# Patient Record
Sex: FEMALE | Race: WHITE | NOT HISPANIC OR LATINO | URBAN - METROPOLITAN AREA
[De-identification: names, ages, dates, MRNs, and addresses within clinical notes are randomized per-mention and may not be internally consistent; named-entity substitution may affect disease eponyms.]

---

## 2018-05-16 ENCOUNTER — INPATIENT (INPATIENT)
Facility: HOSPITAL | Age: 83
LOS: 1 days | Discharge: HOME | End: 2018-05-18
Attending: INTERNAL MEDICINE | Admitting: INTERNAL MEDICINE

## 2018-05-16 VITALS
SYSTOLIC BLOOD PRESSURE: 181 MMHG | TEMPERATURE: 97 F | HEART RATE: 68 BPM | RESPIRATION RATE: 18 BRPM | OXYGEN SATURATION: 99 % | DIASTOLIC BLOOD PRESSURE: 74 MMHG

## 2018-05-16 DIAGNOSIS — S72.009A FRACTURE OF UNSPECIFIED PART OF NECK OF UNSPECIFIED FEMUR, INITIAL ENCOUNTER FOR CLOSED FRACTURE: Chronic | ICD-10-CM

## 2018-05-16 LAB
ALBUMIN SERPL ELPH-MCNC: 4.7 G/DL — SIGNIFICANT CHANGE UP (ref 3.5–5.2)
ALP SERPL-CCNC: 97 U/L — SIGNIFICANT CHANGE UP (ref 30–115)
ALT FLD-CCNC: 10 U/L — SIGNIFICANT CHANGE UP (ref 0–41)
ANION GAP SERPL CALC-SCNC: 16 MMOL/L — HIGH (ref 7–14)
APTT BLD: 25.4 SEC — LOW (ref 27–39.2)
AST SERPL-CCNC: 18 U/L — SIGNIFICANT CHANGE UP (ref 0–41)
BASOPHILS # BLD AUTO: 0.04 K/UL — SIGNIFICANT CHANGE UP (ref 0–0.2)
BASOPHILS NFR BLD AUTO: 0.5 % — SIGNIFICANT CHANGE UP (ref 0–1)
BILIRUB SERPL-MCNC: 0.7 MG/DL — SIGNIFICANT CHANGE UP (ref 0.2–1.2)
BUN SERPL-MCNC: 26 MG/DL — HIGH (ref 10–20)
CALCIUM SERPL-MCNC: 10.5 MG/DL — HIGH (ref 8.5–10.1)
CHLORIDE SERPL-SCNC: 101 MMOL/L — SIGNIFICANT CHANGE UP (ref 98–110)
CK MB CFR SERPL CALC: 1.5 NG/ML — SIGNIFICANT CHANGE UP (ref 0.6–6.3)
CK SERPL-CCNC: 53 U/L — SIGNIFICANT CHANGE UP (ref 0–225)
CO2 SERPL-SCNC: 27 MMOL/L — SIGNIFICANT CHANGE UP (ref 17–32)
CREAT SERPL-MCNC: 1 MG/DL — SIGNIFICANT CHANGE UP (ref 0.7–1.5)
EOSINOPHIL # BLD AUTO: 0.39 K/UL — SIGNIFICANT CHANGE UP (ref 0–0.7)
EOSINOPHIL NFR BLD AUTO: 4.8 % — SIGNIFICANT CHANGE UP (ref 0–8)
GLUCOSE SERPL-MCNC: 109 MG/DL — HIGH (ref 70–99)
HCT VFR BLD CALC: 34.2 % — LOW (ref 37–47)
HGB BLD-MCNC: 11.8 G/DL — LOW (ref 12–16)
IMM GRANULOCYTES NFR BLD AUTO: 0.4 % — HIGH (ref 0.1–0.3)
INR BLD: 1.02 RATIO — SIGNIFICANT CHANGE UP (ref 0.65–1.3)
LYMPHOCYTES # BLD AUTO: 1.31 K/UL — SIGNIFICANT CHANGE UP (ref 1.2–3.4)
LYMPHOCYTES # BLD AUTO: 16.2 % — LOW (ref 20.5–51.1)
MCHC RBC-ENTMCNC: 31.2 PG — HIGH (ref 27–31)
MCHC RBC-ENTMCNC: 34.5 G/DL — SIGNIFICANT CHANGE UP (ref 32–37)
MCV RBC AUTO: 90.5 FL — SIGNIFICANT CHANGE UP (ref 81–99)
MONOCYTES # BLD AUTO: 0.45 K/UL — SIGNIFICANT CHANGE UP (ref 0.1–0.6)
MONOCYTES NFR BLD AUTO: 5.6 % — SIGNIFICANT CHANGE UP (ref 1.7–9.3)
NEUTROPHILS # BLD AUTO: 5.87 K/UL — SIGNIFICANT CHANGE UP (ref 1.4–6.5)
NEUTROPHILS NFR BLD AUTO: 72.5 % — SIGNIFICANT CHANGE UP (ref 42.2–75.2)
NRBC # BLD: 0 /100 WBCS — SIGNIFICANT CHANGE UP (ref 0–0)
PLATELET # BLD AUTO: 320 K/UL — SIGNIFICANT CHANGE UP (ref 130–400)
POTASSIUM SERPL-MCNC: 3.6 MMOL/L — SIGNIFICANT CHANGE UP (ref 3.5–5)
POTASSIUM SERPL-SCNC: 3.6 MMOL/L — SIGNIFICANT CHANGE UP (ref 3.5–5)
PROT SERPL-MCNC: 7.7 G/DL — SIGNIFICANT CHANGE UP (ref 6–8)
PROTHROM AB SERPL-ACNC: 11 SEC — SIGNIFICANT CHANGE UP (ref 9.95–12.87)
RBC # BLD: 3.78 M/UL — LOW (ref 4.2–5.4)
RBC # FLD: 12.9 % — SIGNIFICANT CHANGE UP (ref 11.5–14.5)
SODIUM SERPL-SCNC: 144 MMOL/L — SIGNIFICANT CHANGE UP (ref 135–146)
TROPONIN T SERPL-MCNC: <0.01 NG/ML — SIGNIFICANT CHANGE UP
WBC # BLD: 8.09 K/UL — SIGNIFICANT CHANGE UP (ref 4.8–10.8)
WBC # FLD AUTO: 8.09 K/UL — SIGNIFICANT CHANGE UP (ref 4.8–10.8)

## 2018-05-16 RX ORDER — CITALOPRAM 10 MG/1
20 TABLET, FILM COATED ORAL DAILY
Qty: 0 | Refills: 0 | Status: DISCONTINUED | OUTPATIENT
Start: 2018-05-16 | End: 2018-05-18

## 2018-05-16 RX ORDER — PANTOPRAZOLE SODIUM 20 MG/1
40 TABLET, DELAYED RELEASE ORAL
Qty: 0 | Refills: 0 | Status: DISCONTINUED | OUTPATIENT
Start: 2018-05-16 | End: 2018-05-18

## 2018-05-16 RX ORDER — ACETAMINOPHEN 500 MG
650 TABLET ORAL EVERY 6 HOURS
Qty: 0 | Refills: 0 | Status: DISCONTINUED | OUTPATIENT
Start: 2018-05-16 | End: 2018-05-18

## 2018-05-16 RX ORDER — ATORVASTATIN CALCIUM 80 MG/1
80 TABLET, FILM COATED ORAL AT BEDTIME
Qty: 0 | Refills: 0 | Status: DISCONTINUED | OUTPATIENT
Start: 2018-05-16 | End: 2018-05-18

## 2018-05-16 RX ORDER — LEVOTHYROXINE SODIUM 125 MCG
50 TABLET ORAL DAILY
Qty: 0 | Refills: 0 | Status: DISCONTINUED | OUTPATIENT
Start: 2018-05-16 | End: 2018-05-18

## 2018-05-16 RX ORDER — AMLODIPINE BESYLATE 2.5 MG/1
5 TABLET ORAL DAILY
Qty: 0 | Refills: 0 | Status: DISCONTINUED | OUTPATIENT
Start: 2018-05-16 | End: 2018-05-18

## 2018-05-16 RX ORDER — ASPIRIN/CALCIUM CARB/MAGNESIUM 324 MG
162 TABLET ORAL DAILY
Qty: 0 | Refills: 0 | Status: DISCONTINUED | OUTPATIENT
Start: 2018-05-16 | End: 2018-05-18

## 2018-05-16 RX ORDER — HYDROCORTISONE 1 %
1 OINTMENT (GRAM) TOPICAL
Qty: 0 | Refills: 0 | Status: DISCONTINUED | OUTPATIENT
Start: 2018-05-16 | End: 2018-05-18

## 2018-05-16 RX ADMIN — Medication 650 MILLIGRAM(S): at 20:49

## 2018-05-16 RX ADMIN — Medication 162 MILLIGRAM(S): at 22:18

## 2018-05-16 RX ADMIN — ATORVASTATIN CALCIUM 80 MILLIGRAM(S): 80 TABLET, FILM COATED ORAL at 22:02

## 2018-05-16 NOTE — PROGRESS NOTE ADULT - SUBJECTIVE AND OBJECTIVE BOX
***STROKE ALERT CONSULT NOTE    Chief Complaint: stroke alert    Last known normal time: 0800    HPI:  89 yo woman with a PMH of HTN, anemia, hypothyroidism present to the ED after waking up with room spinning dizziness which isn't uncommon for her. As per family she was also taking longer to answer questions. She was persistently dizzy and went to the PMD, who referred her to the ER. On exam dizziness has subsided and her NIHSS was 0. No IV TPA for NIHSS 0.    PAST MEDICAL & SURGICAL HISTORY:  Anemia  Aortic stenosis  [place]'s disease  Hypothyroid  HTN (hypertension)      FAMILY HISTORY:      Social History: (-) x 3    Allergies    penicillins (Unknown)  sulfonamides (Unknown)    Intolerances        MEDICATIONS  (STANDING):    MEDICATIONS  (PRN):    Vital Signs Last 24 Hrs  T(C): 36.2 (16 May 2018 14:04), Max: 36.2 (16 May 2018 14:04)  T(F): 97.1 (16 May 2018 14:04), Max: 97.1 (16 May 2018 14:04)  HR: 68 (16 May 2018 14:04) (68 - 68)  BP: 181/74 (16 May 2018 14:04) (181/74 - 181/74)  BP(mean): --  RR: 18 (16 May 2018 14:04) (18 - 18)  SpO2: 99% (16 May 2018 14:04) (99% - 99%)    Neurologic Examination:  General:  Appearance is consistent with chronologic age.  No abnormal facies.   General: The patient is oriented to person, place, time and date.   Fund of knowledge is intact and normal.  Language with normal repetition, comprehension and naming.  Nondysarthric.    Cranial nerves: intact VA, VFF.  EOMI w/o nystagmus, skew or reported double vision.  PERRL. right eye chronically abducted. No ptosis/weakness of eyelid closure.  Facial sensation is normal with normal bite.  No facial asymmetry.  Hearing grossly intact b/l.  Palate elevates midline.  Tongue midline.  Motor examination:   Normal tone, bulk and range of motion.  No tenderness, twitching, tremors or involuntary movements.  Formal Muscle Strength Testing: (MRC grade R/L) 5/5 UE; 5/5 LE.  No observable drift.  Sensory examination:   Intact to light touch and pinprick, pain, temperature and proprioception and vibration in all extremities.  Cerebellum:   FTN/HKS intact with normal JORADN in all limbs.  No dysmetria or dysdiadokinesia.    NIH Stroke Scale:0    Neuroimaging:  NCHCT:  CTH prelim neg  Assessment:    89 yo woman with a PMH of HTN, anemia, hypothyroidism present to the ED after waking up with room spinning dizziness which isn't uncommon for her. As per family she was also taking longer to answer questions. She was persistently dizzy and went to the PMD, who referred her to the ER. On exam dizziness has subsided and her NIHSS was 0.    Plan:   MRI brain w/o kari  Observational unit.  Normal Saline 75cc/hr   mg daily   Lipitor 80 mg qhs  PT OT Rehab  Speech therapy    Tyson Bass NP  [phone] ***STROKE ALERT CONSULT NOTE    Chief Complaint: stroke alert    Last known normal time: 0800    HPI:  89 yo woman with a PMH of HTN, anemia, hypothyroidism present to the ED after waking up with room spinning dizziness which isn't uncommon for her. As per family she was also taking longer to answer questions. She was persistently dizzy and went to the PMD, who referred her to the ER. On exam dizziness has subsided and her NIHSS was 0. No IV TPA for NIHSS 0.    PAST MEDICAL & SURGICAL HISTORY:  Anemia  Aortic stenosis  [place]'s disease  Hypothyroid  HTN (hypertension)      FAMILY HISTORY:      Social History: (-) x 3    Allergies    penicillins (Unknown)  sulfonamides (Unknown)    Intolerances        MEDICATIONS  (STANDING):    MEDICATIONS  (PRN):    Vital Signs Last 24 Hrs  T(C): 36.2 (16 May 2018 14:04), Max: 36.2 (16 May 2018 14:04)  T(F): 97.1 (16 May 2018 14:04), Max: 97.1 (16 May 2018 14:04)  HR: 68 (16 May 2018 14:04) (68 - 68)  BP: 181/74 (16 May 2018 14:04) (181/74 - 181/74)  BP(mean): --  RR: 18 (16 May 2018 14:04) (18 - 18)  SpO2: 99% (16 May 2018 14:04) (99% - 99%)    Neurologic Examination:  General:  Appearance is consistent with chronologic age.  No abnormal facies.   General: The patient is oriented to person, place, time and date.   Fund of knowledge is intact and normal.  Language with normal repetition, comprehension and naming.  Nondysarthric.    Cranial nerves: intact VA, VFF.  EOMI w/o nystagmus, skew or reported double vision.  PERRL. right eye chronically abducted. No ptosis/weakness of eyelid closure.  Facial sensation is normal with normal bite.  No facial asymmetry.  Hearing grossly intact b/l.  Palate elevates midline.  Tongue midline.  Motor examination:   Normal tone, bulk and range of motion.  No tenderness, twitching, tremors or involuntary movements.  Formal Muscle Strength Testing: (MRC grade R/L) 5/5 UE; 5/5 LE.  No observable drift.  Sensory examination:   Intact to light touch and pinprick, pain, temperature and proprioception and vibration in all extremities.  Cerebellum:   FTN/HKS intact with normal JORDAN in all limbs.  No dysmetria or dysdiadokinesia.    NIH Stroke Scale:0    Neuroimaging:  NCHCT:  CTH prelim neg  Assessment:    89 yo woman with a PMH of HTN, anemia, hypothyroidism present to the ED after waking up with room spinning dizziness which isn't uncommon for her. As per family she was also taking longer to answer questions. She was persistently dizzy and went to the PMD, who referred her to the ER. On exam dizziness has subsided and her NIHSS was 0.    Plan:   MRI brain w/o kari  Observational unit.  Normal Saline 75cc/hr  PT OT Rehab  Speech therapy    Tyson Bass NP  [phone] ***STROKE ALERT CONSULT NOTE    Chief Complaint: stroke alert    Last known normal time: 0800    HPI:  89 yo woman with a PMH of HTN, anemia, hypothyroidism present to the ED after waking up with room spinning dizziness which isn't uncommon for her. As per family she was also taking longer to answer questions. She was persistently dizzy and went to the PMD, who referred her to the ER. On exam dizziness has subsided and her NIHSS was 0. No IV TPA for NIHSS 0.    PAST MEDICAL & SURGICAL HISTORY:  Anemia  Aortic stenosis  [place]'s disease  Hypothyroid  HTN (hypertension)      FAMILY HISTORY:      Social History: (-) x 3    Allergies    penicillins (Unknown)  sulfonamides (Unknown)    Intolerances        MEDICATIONS  (STANDING):    MEDICATIONS  (PRN):    Vital Signs Last 24 Hrs  T(C): 36.2 (16 May 2018 14:04), Max: 36.2 (16 May 2018 14:04)  T(F): 97.1 (16 May 2018 14:04), Max: 97.1 (16 May 2018 14:04)  HR: 68 (16 May 2018 14:04) (68 - 68)  BP: 181/74 (16 May 2018 14:04) (181/74 - 181/74)  BP(mean): --  RR: 18 (16 May 2018 14:04) (18 - 18)  SpO2: 99% (16 May 2018 14:04) (99% - 99%)    Neurologic Examination:  General:  Appearance is consistent with chronologic age.  No abnormal facies.   General: The patient is oriented to person, place, time and date.   Fund of knowledge is intact and normal.  Language with normal repetition, comprehension and naming.  Nondysarthric.    Cranial nerves: intact VA, VFF.  EOMI w/o nystagmus, skew or reported double vision.  PERRL. right eye chronically abducted. No ptosis/weakness of eyelid closure.  Facial sensation is normal with normal bite.  No facial asymmetry.  Hearing grossly intact b/l.  Palate elevates midline.  Tongue midline.  Motor examination:   Normal tone, bulk and range of motion.  No tenderness, twitching, tremors or involuntary movements.  Formal Muscle Strength Testing: (MRC grade R/L) 5/5 UE; 5/5 LE.  No observable drift.  Sensory examination:   Intact to light touch and pinprick, pain, temperature and proprioception and vibration in all extremities.  Cerebellum:   FTN/HKS intact with normal JORDAN in all limbs.  No dysmetria or dysdiadokinesia.    NIH Stroke Scale:0    Neuroimaging:  NCHCT:  1.  Periventricular and subcortical white matter chronic small vessel   ischemic changes.    2.  No acute mass effect, midline shift or hemorrhage.      Assessment:    89 yo woman with a PMH of HTN, anemia, hypothyroidism present to the ED after waking up with room spinning dizziness which isn't uncommon for her. As per family she was also taking longer to answer questions. She was persistently dizzy and went to the PMD, who referred her to the ER. On exam dizziness has subsided and her NIHSS was 0. No acute stroke intervention.    Plan:   MRI brain w/o kari  Observational unit.   mg daily.  Lipitor 80 mg Qhs.  Normal Saline 75cc/hr  PT OT Rehab  Speech therapy    Tyson Bass NP  [id number]

## 2018-05-16 NOTE — H&P ADULT - NSHPPHYSICALEXAM_GEN_ALL_CORE
Vital Signs Last 24 Hrs  T(C): 36.2 (16 May 2018 14:04), Max: 36.2 (16 May 2018 14:04)  T(F): 97.1 (16 May 2018 14:04), Max: 97.1 (16 May 2018 14:04)  HR: 68 (16 May 2018 14:04) (68 - 68)  BP: 181/74 (16 May 2018 14:04) (181/74 - 181/74)  BP(mean): --  RR: 18 (16 May 2018 14:04) (18 - 18)  SpO2: 99% (16 May 2018 14:04) (99% - 99%)    PHYSICAL EXAM:  GENERAL: NAD, well-developed  HEAD:  Atraumatic, Normocephalic  EYES: EOMI, PERRLA, conjunctiva and sclera clear  NECK: Supple, No JVD  CHEST/LUNG: Clear to auscultation bilaterally; No wheeze  HEART: Regular rate and rhythm; No murmurs, rubs, or gallops  ABDOMEN: Soft, Nontender, Nondistended; Bowel sounds present  EXTREMITIES:  2+ Peripheral Pulses, No clubbing, cyanosis, or edema  PSYCH: AAOx3    NEUROLOGY: Appearance is consistent with chronologic age.  No abnormal facies.   The patient is oriented to person, place, time and date.   Fund of knowledge is intact and normal.  Language with normal repetition, comprehension and naming.  Nondysarthric.    Cranial nerves: intact VA, VFF.  EOMI w/o nystagmus, skew or reported double vision.  PERRL. right eye chronically abducted. No ptosis/weakness of eyelid closure.  Facial sensation is normal with normal bite.  No facial asymmetry.  Hearing grossly intact b/l.  Palate elevates midline.  Tongue midline.  Motor examination:   Normal tone, bulk and range of motion.  No tenderness, twitching, tremors or involuntary movements.  Formal Muscle Strength Testing: (MRC grade R/L) 5/5 UE; 5/5 LE.  No observable drift.  Sensory examination:   Intact to light touch and pinprick, pain, temperature and proprioception and vibration in all extremities.  Cerebellum:   FTN/HKS intact with normal JORDAN in all limbs.  No dysmetria or dysdiadokinesia.    SKIN: diffuse erythematous patches with excoriations to neck and b/l upper extremities

## 2018-05-16 NOTE — ED PROVIDER NOTE - ATTENDING CONTRIBUTION TO CARE
86 YOF PMHx Aortic stenosis, [place] disease, (+ASA), p/w weakness and feeling "off-balance" since she woke up this am at 9am.  Per pt, these sx are common, however sx usually resolve after waking up and doing her routine.  Sx did not go away.  Daughter came over, took pt to PMD.  PMD was concerned w/ pt's responses to questions.  Her time was delayed and she did not respond as she normally would.   Pt to ED for further eval.   Full neuro exam in ED normal, NIHSS: 0.  however pt still w. delayed responses to questions and feeling "off".  Stroke code activated.   Stroke team bedside..     Denies trauma. Denies fever, HA, fever, confusion, focal weakness, urinary symptoms, CP, SOB, leg swelling, vision changes.  Impression: off balance and generalized weakness since pt woke up.   Concern for CVA vs medication vs electrolyte vs infection vs other  Plan:  fingerstick, CT head, EKG, labs, CXR, d/w neuro, re-assess

## 2018-05-16 NOTE — PROGRESS NOTE ADULT - ATTENDING COMMENTS
Patient seen and examined agree with above except as noted.  Patient presenting with dizziness and slow to respond to family  CTH unremarkable  CTA no major intravascular stenosis    A+Ox3 language + Attention nl  CN 2-12 NL  power 5/5 no drift  Temp, Vib symmetric  FTN normal  HTS normal  plantar down b/l  NIHSS 0    Patient is a 87yo F presenting with dizziness  1. Admit to stroke unit  2. Q2 hour neurochecks; if any change in neuro exam or increase in NIHSS call call STAT  3. MRI brain MRA H+N  4. Follow up labs  5. 2dECHO  6. -160  7. ASA + Statin

## 2018-05-16 NOTE — ED PROVIDER NOTE - OBJECTIVE STATEMENT
85y/o F, h/o AS, Grovers disease, presents with weakness and feeling off-balance since 9am this morning when she woke up.   Per family, pt was at baseline yesterday. Denies fever, HA, trauma, urinary symptoms, CP, SOB, leg swelling. 87y/o F, h/o Aortic stenosis, [place] disease, +ASA, presents with weakness and feeling off-balance since 9am this morning when she woke up. She was seen by her PMD this morning who referred her to the ED. Pt states she is currently feeling better. Per family, pt was at baseline yesterday but c/o an erythematous pruritic rash. No trauma,Denies fever, HA, trauma, urinary symptoms, CP, SOB, leg swelling, visin changes. 85y/o F, h/o Aortic stenosis, [place] disease, +ASA, presents with weakness and feeling off-balance since 9am this morning when she woke up. She was seen by her PMD this morning who referred her to the ED. Pt states she is currently feeling better. Per family, pt was at baseline yesterday but c/o an erythematous pruritic rash. No trauma.Denies fever, HA, trauma, urinary symptoms, CP, SOB, leg swelling, visin changes. 85y/o F, h/o Aortic stenosis, [place] disease, +ASA, presents with weakness and feeling off-balance since 9am this morning when she woke up. She was seen by her PMD this morning who referred her to the ED. Pt states she is currently feeling better. Per family, pt was at baseline yesterday but c/o an erythematous pruritic rash. No trauma.Denies fever, HA, trauma, urinary symptoms, CP, SOB, leg swelling, vision changes.

## 2018-05-16 NOTE — ED PROVIDER NOTE - SKIN COLOR
diffuse erythematous patches with excoriations diffuse erythematous patches with excoriations to neck and b/l upper extremities

## 2018-05-16 NOTE — H&P ADULT - HISTORY OF PRESENT ILLNESS
89 yo woman with a PMH of HTN, anemia, hypothyroidism present to the ED after waking up with room spinning around her and dizziness which has been happening for quite some time. As per family she was also taking longer to answer questions. the condition lasted for about 5 hours which made her to pay a visit to her PMD, who referred her to the ER. Also, patient reports leaning toward her left side. She denies other neurological symptoms or deficits, also no visual changes or headache. No report of fever or chills  On exam dizziness has subsided and her NIHSS was 0. No IV TPA for NIHSS 0.

## 2018-05-16 NOTE — H&P ADULT - NSHPLABSRESULTS_GEN_ALL_CORE
11.8   8.09  )-----------( 320      ( 16 May 2018 16:20 )             34.2     05-16    144  |  101  |  26<H>  ----------------------------<  109<H>  3.6   |  27  |  1.0    Ca    10.5<H>      16 May 2018 16:20    TPro  7.7  /  Alb  4.7  /  TBili  0.7  /  DBili  x   /  AST  18  /  ALT  10  /  AlkPhos  97  05-16    CARDIAC MARKERS ( 16 May 2018 16:20 )  x     / <0.01 ng/mL / 53 U/L / x     / 1.5 ng/mL    PT/INR - ( 16 May 2018 16:20 )   PT: 11.00 sec;   INR: 1.02 ratio    PTT - ( 16 May 2018 16:20 )  PTT:25.4 sec    < from: CT Angio Head w/ IV Cont (05.16.18 @ 16:58) >    Impression:    CTA neck: Mild (approximately 30-40%) focal stenosis of the proximal left   internal carotidartery at the bifurcation.    No significant right internal carotid artery stenosis.    CTA head: No significant vascular stenosis within the head.    < end of copied text >    < from: CT Angio Neck w/ IV Cont (05.16.18 @ 16:56) >      Impression:    CTA neck: Mild (approximately 30-40%) focal stenosis of the proximal left   internal carotidartery at the bifurcation.    No significant right internal carotid artery stenosis.    CTA head: No significant vascular stenosis within the head.    < end of copied text >    < from: CT Head No Cont (05.16.18 @ 16:14) >      IMPRESSION:     1.  Periventricular and subcortical white matter chronic small vessel   ischemic changes.    2.  No acute mass effect, midline shift or hemorrhage.      3.  If the patient continues to be symptomatic follow-up MRI of the brain   may be helpful for further evaluation.    < end of copied text >

## 2018-05-16 NOTE — ED PROVIDER NOTE - MEDICAL DECISION MAKING DETAILS
All studies reviewed at length w/ pt and daughter and stroke team and admitting team.  All parties agree w/ admission to stroke unit for further eval and tx.  Pt remians w/ NIHSS 0

## 2018-05-16 NOTE — H&P ADULT - ASSESSMENT
89 yo woman with a PMH of HTN, anemia, hypothyroidism present to the ED after waking up with room spinning around her and dizziness.  The condition was associated with unsteady gait and leaning toward her left side.    1) vertigo & Dizziness: r/o CVA as a cause of central vertigo  -admit to stroke unit  -neuro fu  -Q2 hour neurochecks if any change in neuro exam or increase in NIHSS call neuro STAT  -MRI brain MRA H+N  - mg daily  -Lipitor 80 mg Qhs.  -2d echo  -keep -160  -pt/rehab    2) HTN  -keep -160 in the first 24 hr, then SBP < 150 afterwards  -Hold HCTZ given her borderline (hypernatremia & hypokalemia) and given the ability of HCTZ to cause orthostasis and dizziness especially in the elderly population  -Norvasc 5 mg q24 hr is an appropriate alternative    3) Hypothyroidism  -c/w synthroid  -TSH eval    4) skin rash: looks like hives  -will use hydrocortisone cream  -avoid classic antihistaminics as they might make patient drowsy which will make the neurochecks challenging    5) DVT ppx: sequentials till a CVA is r/o by MRI    6) GI ppx

## 2018-05-16 NOTE — ED PROVIDER NOTE - PROGRESS NOTE DETAILS
Per pt, she woke up this morning and then developed dizziness/ room spinning.   States these sx are common, however sx usually resolve after waking up and doing her routine.  Sx did not go away.  Daughter came over, took pt to PMD.  PMD was concerned w/ pt's responses to questions.  Her time was delayed and she did not respond as she normally would.   Pt to ED for further eval.   Full neuro exam in ED normal, however pt still w. delayed responses to questions and feeling "off".  Stroke code activated.   Stroke team bedside I agree with evaluation and treatment of this patient wit TIM Mercer.

## 2018-05-17 LAB
ALBUMIN SERPL ELPH-MCNC: 3.7 G/DL — SIGNIFICANT CHANGE UP (ref 3.5–5.2)
ALP SERPL-CCNC: 76 U/L — SIGNIFICANT CHANGE UP (ref 30–115)
ALT FLD-CCNC: 8 U/L — SIGNIFICANT CHANGE UP (ref 0–41)
ANION GAP SERPL CALC-SCNC: 10 MMOL/L — SIGNIFICANT CHANGE UP (ref 7–14)
AST SERPL-CCNC: 16 U/L — SIGNIFICANT CHANGE UP (ref 0–41)
BILIRUB SERPL-MCNC: 0.8 MG/DL — SIGNIFICANT CHANGE UP (ref 0.2–1.2)
BUN SERPL-MCNC: 19 MG/DL — SIGNIFICANT CHANGE UP (ref 10–20)
CALCIUM SERPL-MCNC: 9.5 MG/DL — SIGNIFICANT CHANGE UP (ref 8.5–10.1)
CHLORIDE SERPL-SCNC: 103 MMOL/L — SIGNIFICANT CHANGE UP (ref 98–110)
CHOLEST SERPL-MCNC: 139 MG/DL — SIGNIFICANT CHANGE UP (ref 100–200)
CO2 SERPL-SCNC: 29 MMOL/L — SIGNIFICANT CHANGE UP (ref 17–32)
CREAT SERPL-MCNC: 1.1 MG/DL — SIGNIFICANT CHANGE UP (ref 0.7–1.5)
ESTIMATED AVERAGE GLUCOSE: 94 MG/DL — SIGNIFICANT CHANGE UP (ref 68–114)
GLUCOSE SERPL-MCNC: 126 MG/DL — HIGH (ref 70–99)
HBA1C BLD-MCNC: 4.9 % — SIGNIFICANT CHANGE UP (ref 4–5.6)
HCT VFR BLD CALC: 27.5 % — LOW (ref 37–47)
HDLC SERPL-MCNC: 50 MG/DL — SIGNIFICANT CHANGE UP (ref 40–125)
HGB BLD-MCNC: 9.5 G/DL — LOW (ref 12–16)
LIPID PNL WITH DIRECT LDL SERPL: 74 MG/DL — SIGNIFICANT CHANGE UP (ref 4–129)
MAGNESIUM SERPL-MCNC: 1.7 MG/DL — LOW (ref 1.8–2.4)
MCHC RBC-ENTMCNC: 31.3 PG — HIGH (ref 27–31)
MCHC RBC-ENTMCNC: 34.5 G/DL — SIGNIFICANT CHANGE UP (ref 32–37)
MCV RBC AUTO: 90.5 FL — SIGNIFICANT CHANGE UP (ref 81–99)
NRBC # BLD: 0 /100 WBCS — SIGNIFICANT CHANGE UP (ref 0–0)
PLATELET # BLD AUTO: 265 K/UL — SIGNIFICANT CHANGE UP (ref 130–400)
POTASSIUM SERPL-MCNC: 3.2 MMOL/L — LOW (ref 3.5–5)
POTASSIUM SERPL-SCNC: 3.2 MMOL/L — LOW (ref 3.5–5)
PROT SERPL-MCNC: 6 G/DL — SIGNIFICANT CHANGE UP (ref 6–8)
RBC # BLD: 3.04 M/UL — LOW (ref 4.2–5.4)
RBC # FLD: 12.8 % — SIGNIFICANT CHANGE UP (ref 11.5–14.5)
SODIUM SERPL-SCNC: 142 MMOL/L — SIGNIFICANT CHANGE UP (ref 135–146)
TOTAL CHOLESTEROL/HDL RATIO MEASUREMENT: 2.8 RATIO — LOW (ref 4–5.5)
TRIGL SERPL-MCNC: 93 MG/DL — SIGNIFICANT CHANGE UP (ref 10–149)
WBC # BLD: 6.44 K/UL — SIGNIFICANT CHANGE UP (ref 4.8–10.8)
WBC # FLD AUTO: 6.44 K/UL — SIGNIFICANT CHANGE UP (ref 4.8–10.8)

## 2018-05-17 RX ORDER — POTASSIUM CHLORIDE 20 MEQ
40 PACKET (EA) ORAL ONCE
Qty: 0 | Refills: 0 | Status: COMPLETED | OUTPATIENT
Start: 2018-05-17 | End: 2018-05-17

## 2018-05-17 RX ORDER — ENOXAPARIN SODIUM 100 MG/ML
40 INJECTION SUBCUTANEOUS DAILY
Qty: 0 | Refills: 0 | Status: DISCONTINUED | OUTPATIENT
Start: 2018-05-17 | End: 2018-05-18

## 2018-05-17 RX ORDER — MAGNESIUM SULFATE 500 MG/ML
1 VIAL (ML) INJECTION ONCE
Qty: 0 | Refills: 0 | Status: COMPLETED | OUTPATIENT
Start: 2018-05-17 | End: 2018-05-17

## 2018-05-17 RX ADMIN — Medication 40 MILLIEQUIVALENT(S): at 13:17

## 2018-05-17 RX ADMIN — Medication 1 APPLICATION(S): at 17:35

## 2018-05-17 RX ADMIN — Medication 1 APPLICATION(S): at 06:40

## 2018-05-17 RX ADMIN — CITALOPRAM 20 MILLIGRAM(S): 10 TABLET, FILM COATED ORAL at 06:39

## 2018-05-17 RX ADMIN — PANTOPRAZOLE SODIUM 40 MILLIGRAM(S): 20 TABLET, DELAYED RELEASE ORAL at 06:38

## 2018-05-17 RX ADMIN — Medication 162 MILLIGRAM(S): at 11:25

## 2018-05-17 RX ADMIN — Medication 1 APPLICATION(S): at 06:38

## 2018-05-17 RX ADMIN — ATORVASTATIN CALCIUM 80 MILLIGRAM(S): 80 TABLET, FILM COATED ORAL at 21:17

## 2018-05-17 RX ADMIN — Medication 50 MICROGRAM(S): at 06:39

## 2018-05-17 RX ADMIN — Medication 100 GRAM(S): at 17:34

## 2018-05-17 RX ADMIN — ENOXAPARIN SODIUM 40 MILLIGRAM(S): 100 INJECTION SUBCUTANEOUS at 11:25

## 2018-05-17 RX ADMIN — Medication 1 APPLICATION(S): at 17:36

## 2018-05-17 NOTE — PROGRESS NOTE ADULT - SUBJECTIVE AND OBJECTIVE BOX
SUBJECTIVE:    Patient is a 86y old  Female who presents with a chief complaint of slurred speech, dizzyness (16 May 2018 21:57)    Currently admitted to medicine with the primary diagnosis of Dizziness     Today is hospital day 1d. This morning she is resting comfortably in bed and reports no new issues or overnight events.     PAST MEDICAL & SURGICAL HISTORY  PAST MEDICAL & SURGICAL HISTORY:  Anemia  Aortic stenosis  [place]'s disease  Hypothyroid  HTN (hypertension)  Hip fracture: s/p right ORIF    SOCIAL HISTORY:    ALLERGIES:  penicillins (Unknown)  sulfonamides (Unknown)    MEDICATIONS:  STANDING MEDICATIONS  amLODIPine   Tablet 5 milliGRAM(s) Oral daily  aspirin enteric coated 162 milliGRAM(s) Oral daily  atorvastatin 80 milliGRAM(s) Oral at bedtime  citalopram 20 milliGRAM(s) Oral daily  clobetasol 0.05% Cream 1 Application(s) Topical two times a day  enoxaparin Injectable 40 milliGRAM(s) SubCutaneous daily  hydrocortisone 1% Cream 1 Application(s) Topical two times a day  levothyroxine  Oral Tab/Cap - Peds 50 MICROGram(s) Oral daily  pantoprazole    Tablet 40 milliGRAM(s) Oral before breakfast    PRN MEDICATIONS  acetaminophen   Tablet 650 milliGRAM(s) Oral every 6 hours PRN    VITALS:   T(F): 97.3  HR: 75  BP: 126/59  RR: 16  SpO2: 98%    LABS:                        9.5    6.44  )-----------( 265      ( 17 May 2018 09:13 )             27.5     05-17    142  |  103  |  19  ----------------------------<  126<H>  3.2<L>   |  29  |  1.1    Ca    9.5      17 May 2018 09:13  Mg     1.7     05-17    TPro  6.0  /  Alb  3.7  /  TBili  0.8  /  DBili  x   /  AST  16  /  ALT  8   /  AlkPhos  76  05-17    PT/INR - ( 16 May 2018 16:20 )   PT: 11.00 sec;   INR: 1.02 ratio         PTT - ( 16 May 2018 16:20 )  PTT:25.4 sec      Troponin T, Serum: <0.01 ng/mL (05-16-18 @ 16:20)  Creatine Kinase, Serum: 53 U/L (05-16-18 @ 16:20)      CARDIAC MARKERS ( 16 May 2018 16:20 )  x     / <0.01 ng/mL / 53 U/L / x     / 1.5 ng/mL      RADIOLOGY:    PHYSICAL EXAM:  CHEST/LUNG: Clear to auscultation bilaterally; No wheeze  	HEART: Regular rate and rhythm; No murmurs, rubs, or gallops  	ABDOMEN: Soft, Nontender, Nondistended; Bowel sounds present  	EXTREMITIES:  2+ Peripheral Pulses, No clubbing, cyanosis, or edema  	PSYCH: AAOx3  	NEUROLOGY:     AAOx3. Cranial nerves: intact  PERRL. right eye chronically abducted.  	Motor examination:  5/5 UE; 5/5 LE.  No observable drift.  	Sensory examination:   Intact to light touch and pinprick, pain, temperature and proprioception and vibration in all extremities.    Cerebellum:     No dysmetria or dysdiadokinesia.

## 2018-05-17 NOTE — PROGRESS NOTE ADULT - SUBJECTIVE AND OBJECTIVE BOX
ARIAS PUGA    Chief Complaint: Dizziness.    HPI:  89 yo right handed woman with a PMH of HTN, anemia, hypothyroidism present to the ED after waking up with room spinning dizziness which isn't uncommon for her. As per family she was also taking longer to answer questions. She was persistently dizzy and went to the PMD, who referred her to the ER. On exam dizziness has subsided and her NIHSS was 0, therefore no IV TPA was given.    Relevant PMH:  [] Prior ischemic stroke/TIA  [] Afib  []CAD  [x]HTN  []DLD  []DM []PVD []Obesity [] Sedintary lifestyle []CHF  []VALE []Cancer Hx     Social History: [] Smoking []  Drug Use: []   Alcohol Use:   [] Other:      Possible Location of Stroke:  Unknown etiology of symptom location at this time.   Possible Cause of Stroke:  Unknown symptom etiology at this time, will comment post MRI.  [facility] Cervicocerebral Imaging:  Impression:    CTA neck: Mild (approximately 30-40%) focal stenosis of the proximal left   internal carotidartery at the bifurcation.    No significant right internal carotid artery stenosis.    CTA head: No significant vascular stenosis within the head.    Relevant blood tests:  Direct LDL: 74 mg/dL [4 - 129] (18 @ 09:46)      Relevant cardiac rhythm monitorin hours of telemetry monitoring and no reported events.  Relevant Cardiac Structure:(TTE/AMRIT +/-):No intracardiac thrombus/vegetation/akynesia/EF:  Pending TTE    Home Medications:  ALPRAZolam 0.25 mg oral tablet:  (16 May 2018 15:48)  CeleXA 20 mg oral tablet: 1 tab(s) orally once a day (16 May 2018 15:48)  clobetasol 0.05% topical cream:  (16 May 2018 15:48)  hydroCHLOROthiazide 25 mg oral tablet: 1 tab(s) orally once a day (16 May 2018 18:59)  Prolia 60 mg/mL subcutaneous solution:  (16 May 2018 15:48)  Synthroid 50 mcg (0.05 mg) oral tablet:  (16 May 2018 15:48)  Uloric 40 mg oral tablet:  (16 May 2018 15:48)  Zocor 40 mg oral tablet: 1 tab(s) orally once a day (at bedtime) (16 May 2018 15:48)      MEDICATIONS  (STANDING):  amLODIPine   Tablet 5 milliGRAM(s) Oral daily  aspirin enteric coated 162 milliGRAM(s) Oral daily  atorvastatin 80 milliGRAM(s) Oral at bedtime  citalopram 20 milliGRAM(s) Oral daily  clobetasol 0.05% Cream 1 Application(s) Topical two times a day  enoxaparin Injectable 40 milliGRAM(s) SubCutaneous daily  hydrocortisone 1% Cream 1 Application(s) Topical two times a day  levothyroxine  Oral Tab/Cap - Peds 50 MICROGram(s) Oral daily  pantoprazole    Tablet 40 milliGRAM(s) Oral before breakfast      PT/OT/Speech/Rehab/S&Swr: pending.    Exam:    Vital Signs Last 24 Hrs  T(C): 36.3 (17 May 2018 07:24), Max: 38.1 (16 May 2018 20:21)  T(F): 97.3 (17 May 2018 07:24), Max: 100.6 (16 May 2018 20:21)  HR: 75 (17 May 2018 07:24) (68 - 85)  BP: 126/59 (17 May 2018 07:24) (121/56 - 181/74)  BP(mean): --  RR: 16 (17 May 2018 07:24) (16 - 18)  SpO2: 98% (16 May 2018 21:53) (98% - 99%)    NIHSS      LOC:      1a:   0 1b(Questions):    0    1c(Instructions):   0         Best Gaze:  Visual:  Motor:                 RUE:  0  RLE:   0 LUE: 0   LLE:  0  FACE:   0  Limb Ataxia:0  Sensory:     0  Language:     0  Dysarthria:        0  Extinction and Inattention:0    NIHSS on admission:       0 NIHSS yesterday:  0       NIHSS today:      0      m-RS:0    Impression:  89 yo right handed woman with a PMH of HTN, anemia, hypothyroidism present to the ED after waking up with room spinning dizziness. As per family she was also taking longer to answer questions. She was persistently dizzy and went to the ER. On exam dizziness has resolved and her NIHSS is 0. She is now cared for in the stroke unit with resolved dizziness. Etiology of symptoms to be determined post MRI brain.    Suggestion:  MRI brain w/o kari  TTE with contrast to rule out right to left shunt   Continue ASA  Continue Lipitor  PT OT Rehab    - We spent more than 60 minutes to review the chart, gather necessary information, evaluate the patient and discuss the case with primary team and counseling the family to their satisfaction.  Tyson Bass NP Neurovascular/Stroke  [phone] ARIAS PUGA    Chief Complaint: Dizziness.    HPI:  89 yo right handed woman with a PMH of HTN, anemia, hypothyroidism present to the ED after waking up with room spinning dizziness which isn't uncommon for her. As per family she was also taking longer to answer questions. She was persistently dizzy and went to the PMD, who referred her to the ER. On exam dizziness has subsided and her NIHSS was 0, therefore no IV TPA was given. She reports occasionally having the unsteadiness symptoms when she gets up in the morning for more than 10 years. She never saw a doctor for this reason in the past. The patient stated that usually this occasional unsteadiness resolves about an hour after waking up, but denies having racing heart or other usual symptoms of presyncope.    Relevant PMH:  [] Prior ischemic stroke/TIA  [] Afib  []CAD  [x]HTN  []DLD  []DM []PVD []Obesity [] Sedintary lifestyle []CHF  []VALE []Cancer Hx     Social History: [] Smoking []  Drug Use: []   Alcohol Use:   [] Other:      Possible Location of Stroke:  Unknown etiology of symptom location at this time.   Possible Cause of Stroke:  Unknown symptom etiology at this time, will comment post MRI.  [facility] Cervicocerebral Imaging:  Impression:    CTA neck: Mild (approximately 30-40%) focal stenosis of the proximal left   internal carotidartery at the bifurcation.    No significant right internal carotid artery stenosis.    CTA head: No significant vascular stenosis within the head.    Relevant blood tests:  Direct LDL: 74 mg/dL [4 - 129] (18 @ 09:46)      Relevant cardiac rhythm monitorin hours of telemetry monitoring and no reported events.  Relevant Cardiac Structure:(TTE/AMRIT +/-):No intracardiac thrombus/vegetation/akynesia/EF:  Pending TTE    Home Medications:  ALPRAZolam 0.25 mg oral tablet:  (16 May 2018 15:48)  CeleXA 20 mg oral tablet: 1 tab(s) orally once a day (16 May 2018 15:48)  clobetasol 0.05% topical cream:  (16 May 2018 15:48)  hydroCHLOROthiazide 25 mg oral tablet: 1 tab(s) orally once a day (16 May 2018 18:59)  Prolia 60 mg/mL subcutaneous solution:  (16 May 2018 15:48)  Synthroid 50 mcg (0.05 mg) oral tablet:  (16 May 2018 15:48)  Uloric 40 mg oral tablet:  (16 May 2018 15:48)  Zocor 40 mg oral tablet: 1 tab(s) orally once a day (at bedtime) (16 May 2018 15:48)      MEDICATIONS  (STANDING):  amLODIPine   Tablet 5 milliGRAM(s) Oral daily  aspirin enteric coated 162 milliGRAM(s) Oral daily  atorvastatin 80 milliGRAM(s) Oral at bedtime  citalopram 20 milliGRAM(s) Oral daily  clobetasol 0.05% Cream 1 Application(s) Topical two times a day  enoxaparin Injectable 40 milliGRAM(s) SubCutaneous daily  hydrocortisone 1% Cream 1 Application(s) Topical two times a day  levothyroxine  Oral Tab/Cap - Peds 50 MICROGram(s) Oral daily  pantoprazole    Tablet 40 milliGRAM(s) Oral before breakfast      PT/OT/Speech/Rehab/S&Swr: pending.    Exam:    Vital Signs Last 24 Hrs  T(C): 36.3 (17 May 2018 07:24), Max: 38.1 (16 May 2018 20:21)  T(F): 97.3 (17 May 2018 07:24), Max: 100.6 (16 May 2018 20:21)  HR: 75 (17 May 2018 07:24) (68 - 85)  BP: 126/59 (17 May 2018 07:24) (121/56 - 181/74)  BP(mean): --  RR: 16 (17 May 2018 07:24) (16 - 18)  SpO2: 98% (16 May 2018 21:53) (98% - 99%)    NIHSS      LOC:      1a:   0 1b(Questions):    0    1c(Instructions):   0         Best Gaze:  Visual:  Motor:                 RUE:  0  RLE:   0 LUE: 0   LLE:  0  FACE:   0  Limb Ataxia:0  Sensory:     0  Language:     0  Dysarthria:        0  Extinction and Inattention:0    NIHSS on admission:       0 NIHSS yesterday:  0       NIHSS today:      0      m-RS:0    Impression:  89 yo right handed woman with a PMH of HTN, anemia, hypothyroidism present to the ED after waking up with unsteadiness but not room spinning dizziness. As per family she was also taking longer to answer questions. She was persistently dizzy and went to the ER. On exam unsteadiness has resolved and her NIHSS is 0. She is now cared for in the stroke unit with resolved unsteadiness. Etiology of symptoms to be determined post MRI brain.    Suggestion:  MRI brain w/o kari  TTE with contrast to rule out right to left shunt   Continue ASA  Continue Lipitor  May consider orthostatic VS check.  PT OT Rehab eval.    - We spent more than 60 minutes to review the chart, gather necessary information, evaluate the patient and discuss the case with primary team and counseling the family to their satisfaction.  Tyson Bass NP Neurovascular/Stroke  [phone]    I, Lawrence Adair, examined the patient and discussed this case, including plan of management with the Stroke team.

## 2018-05-17 NOTE — PROGRESS NOTE ADULT - SUBJECTIVE AND OBJECTIVE BOX
ARIAS PUGA 85yo WF  Female from home awakened with dizzyness and  "room spinning".  Pt also was noted to have delayed responses to the family's questions.  Pt denies CP, palpitations, N/V, LOC  or dysarthria.  Pt went to PMD and was sent to the ER for further evaluation.  The was a stroke alert with out full stroke code and was admitted to neuro ICU for w/up and observation. The pt also c/o rash/hives from  use of new cream.  The EKG showed no acute changes, preliminary CE negative, CXR normal, CT of the head showed no acute changes, CT angio of the neck showed generalized atherosclerosis and a L 30-40% focal stenosis of L internal carotid without significant stenosis, CT angio of the head was negative for any vascular stenosis. The pt is being ff by neuro. The pt has an MRI of the brain p and an ECHO.  The PMHx includes:  HTN, AS, Hyperlipidemia,  ASHD, Hypothyroidism, OA, polymyalgia rheumatic,  DDD, DJD, Th kyphosis, mild anemia.    INTERVAL HPI/OVERNIGHT EVENTS:  pt seen in neuro ICU, medically stable, son and daughter at the bed side, verbal alert and oriented, pt is on statin, 2 ASA 81mg    MEDICATIONS  (STANDING):  amLODIPine   Tablet 5 milliGRAM(s) Oral daily  aspirin enteric coated 162 milliGRAM(s) Oral daily  atorvastatin 80 milliGRAM(s) Oral at bedtime  citalopram 20 milliGRAM(s) Oral daily  clobetasol 0.05% Cream 1 Application(s) Topical two times a day  enoxaparin Injectable 40 milliGRAM(s) SubCutaneous daily  hydrocortisone 1% Cream 1 Application(s) Topical two times a day  levothyroxine  Oral Tab/Cap - Peds 50 MICROGram(s) Oral daily  pantoprazole    Tablet 40 milliGRAM(s) Oral before breakfast  potassium chloride   Powder 40 milliEquivalent(s) Oral once    MEDICATIONS  (PRN):  acetaminophen   Tablet 650 milliGRAM(s) Oral every 6 hours PRN For Temp greater than 38 C (100.4 F)      Allergies    penicillins (Unknown)  sulfonamides (Unknown)    Vital Signs Last 24 Hrs  T(C): 36.3 (17 May 2018 07:24), Max: 38.1 (16 May 2018 20:21)  T(F): 97.3 (17 May 2018 07:24), Max: 100.6 (16 May 2018 20:21)  HR: 75 (17 May 2018 07:24) (68 - 85)  BP: 126/59 (17 May 2018 07:24) (121/56 - 181/74)  BP(mean): --  RR: 16 (17 May 2018 07:24) (16 - 18)  SpO2: 98% (16 May 2018 21:53) (98% - 99%)    PHYSICAL EXAM:      Constitutional:  elderly WF, alert, oriented fully verbal, in NAD    Eyes: nrmal, R eye abduction (chronic fx)    ENMT: dry oral mucosa, tongue is mid line, no tremor    Neck:  supple, no JVD, no bruits    Back: mild TH kyphosis    Respiratory:  shallow respirations, good air entry    Cardiovascular:  S1S2 reg, II/VI CIERA    Gastrointestinal:  globose soft and benign    Genitourinary:  no wilhelm    Extremities:  moves all limbs, + arthritic changes, no edema    Neurological:  no deficits, good limb motor strength    Skin: hives    Lymph Nodes:  not enlarge            LABS:                        9.5    6.44  )-----------( 265      ( 17 May 2018 09:13 )             27.5     05-17    142  |  103  |  19  ----------------------------<  126<H>  3.2<L>   |  29  |  1.1    Ca    9.5      17 May 2018 09:13  Mg     1.7     05-17  GFR 51, 45  CE negative  TPro  6.0  /  Alb  3.7  /  TBili  0.8  /  DBili  x   /  AST  16  /  ALT  8   /  AlkPhos  76  05-17    PT/INR - ( 16 May 2018 16:20 )   PT: 11.00 sec;   INR: 1.02 ratio         PTT - ( 16 May 2018 16:20 )  PTT:25.4 sec      RADIOLOGY & ADDITIONAL TESTS:  EKG  NSR 83/min, 1 deg AVB, no acute changes, PRWP,     CXR:  clear    CT of the head:  prominent ventricles, mild cerebral arophy, + white matter changes, no acute intracranila hemorrhage    CT angio of the head:  patent cerebral vasculature    CT angio of the neck:  gen atherosclerosis of the aortic arch and the carotids, codominant vertebral,  with 30-40% L internal carotid a stenosis but no significant obstruction to the vascular flow

## 2018-05-17 NOTE — CONSULT NOTE ADULT - ASSESSMENT
IMPRESSION: Rehab of gait ab ? TIA    PRECAUTIONS: [    x] Cardiac  [    ] Respiratory  [    ] Seizures [    ] Contact Isolation  [    ] Droplet Isolation  [    ] Other    Weight Bearing Status:     RECOMMENDATION:  FOLLOW CBC-                                   PT TO SEE IN AM  Out of Bed to Chair     DVT/Decubiti Prophylaxis    REHAB PLAN:     [   x  ] Bedside P/T 3-5 times a week   [     ] Bedside O/T  2-3 times a week   [     ] No Rehab Therapy Indicated   [     ]  Speech Therapy   Conditioning/ROM                                 ADL  Bed Mobility                                            Conditioning/ROM  Transfers                                                  Bed Mobility  Sitting /Standing Balance                      Transfers                                        Gait Training                                            Sitting/Standing Balance  Stair Training [   ]Applicable                 Home equipment Eval                                                                     Splinting  [   ] Only      GOALS:   ADL   [  x  ]   Independent         Transfers  [   x ] Independent            Ambulation  [ x    ] Independent     [ x    ] With device?                            [    ]  CG                                               [    ]  CG                                                    [     ] CG                            [    ] Min A                                          [    ] Min A                                                [     ] Min  A          DISCHARGE PLAN:   [     ]  Good candidate for Intensive Rehabilitation/Hospital based-[facility]A [facility]                                             Will tolerate 3hrs Intensive Rehab Daily                                       [      ]  Short Term Rehab in Skilled Nursing Facility                                       [    x  ]  Home with Outpatient or VN services                                         [      ]  Possible Candidate for Intensive Hospital based Rehab

## 2018-05-17 NOTE — CONSULT NOTE ADULT - SUBJECTIVE AND OBJECTIVE BOX
Patient is a 86y old  Female who presents with a chief complaint of slurred speech, dizzyness (16 May 2018 21:57)    HPI:  87 yo woman with a PMH of HTN, anemia, hypothyroidism present to the ED after waking up with room spinning around her and dizziness which has been happening for quite some time. As per family she was also taking longer to answer questions. the condition lasted for about 5 hours which made her to pay a visit to her PMD, who referred her to the ER. Also, patient reports leaning toward her left side. She denies other neurological symptoms or deficits, also no visual changes or headache. No report of fever or chills  On exam dizziness has subsided and her NIHSS was 0. No IV TPA for NIHSS 0. (16 May 2018 18:49)      PAST MEDICAL & SURGICAL HISTORY:  Anemia  Aortic stenosis  Flaco's disease  Hypothyroid  HTN (hypertension)  Hip fracture: s/p right ORIF  hx of fall with l orbital fx/ l humeral fx    Hospital Course: Seen by neuro- W/U including CTA, HEAD CT MRI, MRA negative for acute CVA- L carotid stenosis 40%  Of note HGb 11.8 down to 9.5 today- clinically asymptomatic  TODAY'S SUBJECTIVE & REVIEW OF SYMPTOMS:     Constitutional WNL   Cardio WNL   Resp WNL   GI WNL  Heme WNL  Endo WNL  Skin WNL  MSK WNL  Neuro WNL  Cognitive WNL  Psych WNL      MEDICATIONS  (STANDING):  amLODIPine   Tablet 5 milliGRAM(s) Oral daily  aspirin enteric coated 162 milliGRAM(s) Oral daily  atorvastatin 80 milliGRAM(s) Oral at bedtime  citalopram 20 milliGRAM(s) Oral daily  clobetasol 0.05% Cream 1 Application(s) Topical two times a day  enoxaparin Injectable 40 milliGRAM(s) SubCutaneous daily  hydrocortisone 1% Cream 1 Application(s) Topical two times a day  levothyroxine  Oral Tab/Cap - Peds 50 MICROGram(s) Oral daily  magnesium sulfate  IVPB 1 Gram(s) IV Intermittent once  pantoprazole    Tablet 40 milliGRAM(s) Oral before breakfast    MEDICATIONS  (PRN):  acetaminophen   Tablet 650 milliGRAM(s) Oral every 6 hours PRN For Temp greater than 38 C (100.4 F)      FAMILY HISTORY:  No pertinent family history in first degree relatives      Allergies    penicillins (Unknown)  sulfonamides (Unknown)    Intolerances        SOCIAL HISTORY:    [    ] Etoh  [    ] Smoking  [    ] Substance abuse     Home Environment:  [    ] Home Alone  [ x   ] Lives with Family  [    ] Home Health Aid    Dwelling:  [    ] Apartment  [  x  ] Private House  [    ] Adult Home  [    ] Skilled Nursing Facility      [    ] Short Term  [    ] Long Term  [x    ] Stairs       3outside                    [    ] Elevator     FUNCTIONAL STATUS PTA: (Check all that apply)  Ambulation: [   x  ]Independent    [    ] Dependent     [    ] Non-Ambulatory  Assistive Device: [    ] SA Cane  [    ]  Q Cane  [    ] Walker  [    ]  Wheelchair  ADL : [   x ] Independent  [    ]  Dependent       Vital Signs Last 24 Hrs  T(C): 37.1 (17 May 2018 15:54), Max: 38.1 (16 May 2018 20:21)  T(F): 98.7 (17 May 2018 15:54), Max: 100.6 (16 May 2018 20:21)  HR: 73 (17 May 2018 15:54) (64 - 85)  BP: 120/59 (17 May 2018 15:54) (111/56 - 132/62)  BP(mean): --  RR: 18 (17 May 2018 15:54) (16 - 18)  SpO2: 98% (16 May 2018 21:53) (98% - 98%)      PHYSICAL EXAM: Alert & Oriented X3  GENERAL: NAD, well-groomed, well-developed  HEAD:  Atraumatic, Normocephalic  EYES: EOMI, PERRLA, conjunctiva and sclera clear  NECK: Supple, No JVD, Normal thyroid  CHEST/LUNG: Clear to percussion bilaterally; No rales, rhonchi, wheezing, or rubs  HEART: Regular rate and rhythm; No murmurs, rubs, or gallops  ABDOMEN: Soft, Nontender, Nondistended; Bowel sounds present  EXTREMITIES:  2+ Peripheral Pulses, No clubbing, cyanosis, or edema    NERVOUS SYSTEM:  Cranial Nerves 2-12 intact [ x   ] Abnormal  [    ]  ROM: WFL all extremities [  x  ]  Abnormal [     ]  Motor Strength: WFL all extremities  [ x   ]  Abnormal [    ]  Sensation: intact to light touch [  x  ] Abnormal [    ]  no drift, FTN intact, no nystagmus    FUNCTIONAL STATUS:  Bed Mobility: [  ]  Independent [    ]  Supervision [ x   ]  Needs Assistance [  ]  N/A  Transfers: [   ]  Independent [    ]  Supervision [ x   ]  Needs Assistance [    ]  N/A    Ambulation:  [    ]  Independent [    ]  Supervision [    ]  Needs Assistance [  x  ]  N/A   ADL:  [    ]   Independent [ x   ] Requires Assistance [    ] N/A       LABS:                        9.5    6.44  )-----------( 265      ( 17 May 2018 09:13 )             27.5     05-17    142  |  103  |  19  ----------------------------<  126<H>  3.2<L>   |  29  |  1.1    Ca    9.5      17 May 2018 09:13  Mg     1.7     05-17    TPro  6.0  /  Alb  3.7  /  TBili  0.8  /  DBili  x   /  AST  16  /  ALT  8   /  AlkPhos  76  05-17    PT/INR - ( 16 May 2018 16:20 )   PT: 11.00 sec;   INR: 1.02 ratio         PTT - ( 16 May 2018 16:20 )  PTT:25.4 sec      RADIOLOGY & ADDITIONAL STUDIES:

## 2018-05-18 VITALS
SYSTOLIC BLOOD PRESSURE: 121 MMHG | RESPIRATION RATE: 18 BRPM | TEMPERATURE: 96 F | DIASTOLIC BLOOD PRESSURE: 73 MMHG | HEART RATE: 79 BPM

## 2018-05-18 LAB
ANION GAP SERPL CALC-SCNC: 11 MMOL/L — SIGNIFICANT CHANGE UP (ref 7–14)
BUN SERPL-MCNC: 23 MG/DL — HIGH (ref 10–20)
CALCIUM SERPL-MCNC: 9.7 MG/DL — SIGNIFICANT CHANGE UP (ref 8.5–10.1)
CHLORIDE SERPL-SCNC: 104 MMOL/L — SIGNIFICANT CHANGE UP (ref 98–110)
CO2 SERPL-SCNC: 28 MMOL/L — SIGNIFICANT CHANGE UP (ref 17–32)
CREAT SERPL-MCNC: 1.1 MG/DL — SIGNIFICANT CHANGE UP (ref 0.7–1.5)
GLUCOSE SERPL-MCNC: 99 MG/DL — SIGNIFICANT CHANGE UP (ref 70–99)
HCT VFR BLD CALC: 29.4 % — LOW (ref 37–47)
HGB BLD-MCNC: 10.2 G/DL — LOW (ref 12–16)
MCHC RBC-ENTMCNC: 31.7 PG — HIGH (ref 27–31)
MCHC RBC-ENTMCNC: 34.7 G/DL — SIGNIFICANT CHANGE UP (ref 32–37)
MCV RBC AUTO: 91.3 FL — SIGNIFICANT CHANGE UP (ref 81–99)
NRBC # BLD: 0 /100 WBCS — SIGNIFICANT CHANGE UP (ref 0–0)
PLATELET # BLD AUTO: 296 K/UL — SIGNIFICANT CHANGE UP (ref 130–400)
POTASSIUM SERPL-MCNC: 4.4 MMOL/L — SIGNIFICANT CHANGE UP (ref 3.5–5)
POTASSIUM SERPL-SCNC: 4.4 MMOL/L — SIGNIFICANT CHANGE UP (ref 3.5–5)
RBC # BLD: 3.22 M/UL — LOW (ref 4.2–5.4)
RBC # FLD: 12.8 % — SIGNIFICANT CHANGE UP (ref 11.5–14.5)
SODIUM SERPL-SCNC: 143 MMOL/L — SIGNIFICANT CHANGE UP (ref 135–146)
TSH SERPL-MCNC: 1.84 UIU/ML — SIGNIFICANT CHANGE UP (ref 0.27–4.2)
WBC # BLD: 7.59 K/UL — SIGNIFICANT CHANGE UP (ref 4.8–10.8)
WBC # FLD AUTO: 7.59 K/UL — SIGNIFICANT CHANGE UP (ref 4.8–10.8)

## 2018-05-18 RX ORDER — ALPRAZOLAM 0.25 MG
0 TABLET ORAL
Qty: 0 | Refills: 0 | COMMUNITY

## 2018-05-18 RX ORDER — CITALOPRAM 10 MG/1
1 TABLET, FILM COATED ORAL
Qty: 0 | Refills: 0 | COMMUNITY

## 2018-05-18 RX ORDER — LEVOTHYROXINE SODIUM 125 MCG
0 TABLET ORAL
Qty: 0 | Refills: 0 | COMMUNITY

## 2018-05-18 RX ORDER — ATORVASTATIN CALCIUM 80 MG/1
1 TABLET, FILM COATED ORAL
Qty: 30 | Refills: 0 | OUTPATIENT
Start: 2018-05-18 | End: 2018-06-16

## 2018-05-18 RX ORDER — DENOSUMAB 60 MG/ML
0 INJECTION SUBCUTANEOUS
Qty: 0 | Refills: 0 | COMMUNITY

## 2018-05-18 RX ORDER — AMLODIPINE BESYLATE 2.5 MG/1
1 TABLET ORAL
Qty: 30 | Refills: 0 | OUTPATIENT
Start: 2018-05-18 | End: 2018-06-16

## 2018-05-18 RX ORDER — ASPIRIN/CALCIUM CARB/MAGNESIUM 324 MG
2 TABLET ORAL
Qty: 60 | Refills: 0 | OUTPATIENT
Start: 2018-05-18 | End: 2018-06-16

## 2018-05-18 RX ORDER — FEBUXOSTAT 40 MG/1
0 TABLET ORAL
Qty: 0 | Refills: 0 | COMMUNITY

## 2018-05-18 RX ORDER — SIMVASTATIN 20 MG/1
1 TABLET, FILM COATED ORAL
Qty: 0 | Refills: 0 | COMMUNITY

## 2018-05-18 RX ADMIN — Medication 1 APPLICATION(S): at 17:05

## 2018-05-18 RX ADMIN — CITALOPRAM 20 MILLIGRAM(S): 10 TABLET, FILM COATED ORAL at 13:22

## 2018-05-18 RX ADMIN — Medication 162 MILLIGRAM(S): at 13:22

## 2018-05-18 RX ADMIN — Medication 1 APPLICATION(S): at 06:02

## 2018-05-18 RX ADMIN — Medication 50 MICROGRAM(S): at 06:02

## 2018-05-18 RX ADMIN — Medication 1 APPLICATION(S): at 17:04

## 2018-05-18 RX ADMIN — PANTOPRAZOLE SODIUM 40 MILLIGRAM(S): 20 TABLET, DELAYED RELEASE ORAL at 06:02

## 2018-05-18 RX ADMIN — Medication 1 APPLICATION(S): at 06:03

## 2018-05-18 RX ADMIN — ENOXAPARIN SODIUM 40 MILLIGRAM(S): 100 INJECTION SUBCUTANEOUS at 13:22

## 2018-05-18 RX ADMIN — AMLODIPINE BESYLATE 5 MILLIGRAM(S): 2.5 TABLET ORAL at 06:01

## 2018-05-18 NOTE — DISCHARGE NOTE ADULT - HOSPITAL COURSE
Dizzyness  R/O intracranial pathology  Hypokalemia  Hx of HTN, ASHD, AS  hx of Hyperlipidemia  Hx of Hypothyroidism  Hx of anemia  Hx of Polymyalgia rheumatica, OA, DDD, DJD, kyphosis    pt was a stroke alert, neuro ICU, telemetry  CE negative, ECHO P  CT of the head negative for acute pathology  CT angio of the brain WNL  CT angio of the neck + atherosclerosis, L internal carotid 30-40% stenosis without sig flow obs  MRI s reviewed, L ICA < 50% stenosis, small chronic cerebellar infarcts  EEG borderline to mild generalized slowing  pt on atorvastatin 80mg,  mg  pt ff by neurology  monitor and correct the electrolytes

## 2018-05-18 NOTE — PHYSICAL THERAPY INITIAL EVALUATION ADULT - GAIT DISTANCE, PT EVAL
x 2 to stairwell and back.  Initially attempted ambulation without a.d. but pt had decreased balance and step length. Balance is improved with use of RW./100 feet

## 2018-05-18 NOTE — DISCHARGE NOTE ADULT - PATIENT PORTAL LINK FT
You can access the [facility] Patient Portal, offered by [facility], by registering with the following website: [email or web address] [facility]

## 2018-05-18 NOTE — DISCHARGE NOTE ADULT - MEDICATION SUMMARY - MEDICATIONS TO TAKE
I will START or STAY ON the medications listed below when I get home from the hospital:    aspirin 81 mg oral delayed release tablet  -- 2 tab(s) by mouth once a day  -- Indication: For Heart health    CeleXA 20 mg oral tablet  -- 1 tab(s) by mouth once a day  -- Indication: For Anxiety    atorvastatin 80 mg oral tablet  -- 1 tab(s) by mouth once a day (at bedtime)  -- Indication: For Dld    Uloric 40 mg oral tablet  -- Indication: For Gout    ALPRAZolam 0.25 mg oral tablet  -- Indication: For Anxiety    Prolia 60 mg/mL subcutaneous solution  -- Indication: For bone med    amLODIPine 5 mg oral tablet  -- 1 tab(s) by mouth once a day  -- Indication: For HTN (hypertension)    clobetasol 0.05% topical cream  -- Indication: For rash    hydroCHLOROthiazide 25 mg oral tablet  -- 1 tab(s) by mouth once a day  -- Indication: For HTN (hypertension)    Synthroid 50 mcg (0.05 mg) oral tablet  -- Indication: For Hypothyroid

## 2018-05-18 NOTE — DISCHARGE NOTE ADULT - MEDICATION SUMMARY - MEDICATIONS TO STOP TAKING
I will STOP taking the medications listed below when I get home from the hospital:    Zocor 40 mg oral tablet  -- 1 tab(s) by mouth once a day (at bedtime)

## 2018-05-18 NOTE — PROGRESS NOTE ADULT - PROVIDER SPECIALTY LIST ADULT
Internal Medicine
Internal Medicine
Neurology
Internal Medicine
Internal Medicine

## 2018-05-18 NOTE — DISCHARGE NOTE ADULT - CARE PROVIDER_API CALL
Jane Nolasco), Medicine  [address]  [address]  [place] [address]  Phone: [phone]  Fax: [phone]    Xavi Jim), EEGEpilepsy; Neurology  [address] [place] [address]  [address]  [place] [address]  Phone: [phone]  Fax: [phone]

## 2018-05-18 NOTE — PROGRESS NOTE ADULT - ASSESSMENT
87 yo woman with a PMH of HTN, anemia, hypothyroidism present to the ED after waking up with room spinning around her and dizziness.  The condition was associated with unsteady gait and leaning toward her left side.    1)  syncopal episode 2/2 aortic stenosis  MRI w/o contrast showed no stroke  - mg daily  -Lipitor 80 mg Qhs  -2d echo was completed    2) HTN  held HCTZ  -Norvasc 5 mg q24 hr is an appropriate alternative    3) Hypothyroidism  -c/w synthroid    4) skin rash: looks like hives  on hydrocortisone cream    6) GI ppx  7. hypokalemia: repleted, f/u 4 pm lab
Dizzyness  R/O intracranial pathology  Hypokalemia  Hx of HTN, ASHD, AS  hx of Hyperlipidemia  Hx of Hypothyroidism  Hx of anemia  Hx of Polymyalgia rheumatica, OA, DDD, DJD, kyphosis    pt was a stroke alert, neuro ICU, telemetry  CE negative, ECHO P  CT of the head negative for acute pathology  CT angio of the brain WNL  CT angio of the neck + atherosclerosis, L internal carotid 30-40% stenosis without sig flow obs  MRI s reviewed, L ICA < 50% stenosis, small chronic cerebellar infarcts  EEG borderline to mild generalized slowing  pt on atorvastatin 80mg,  mg  pt ff by neurology  monitor and correct the electrolytes  rehab evaluation, is pt a candidate for in pt rehab otherwise if cleared by neuro anticipate possibility of D/C in 24 hrs
87 yo woman with a PMH of HTN, anemia, hypothyroidism present to the ED after waking up with room spinning around her and dizziness.  The condition was associated with unsteady gait and leaning toward her left side.    1) vertigo & Dizziness: r/o CVA as a cause of central vertigo  -neuro rec to f/u MRI w/o contrast and echo  - mg daily  -Lipitor 80 mg Qhs.  -2d echo  -pt/rehab ordered, lipid panel, a1c WNL    2) HTN  -keep -160 in the first 24 hr, then SBP < 150 afterwards  held HCTZ  -Norvasc 5 mg q24 hr is an appropriate alternative    3) Hypothyroidism  -c/w synthroid  -TSH eval    4) skin rash: looks like hives  -will use hydrocortisone cream  -avoid classic antihistaminics as they might make patient drowsy which will make the neurochecks challenging    5) DVT ppx: sequentials till a CVA is r/o by MRI    6) GI ppx  7. hypokalemia: repleted
Dizzyness  R/O intracranial pathology  Hypokalemia  Hx of HTN, ASHD, AS  hx of Hyperlipidemia  Hx of Hypothyroidism  Hx of anemia  Hx of Polymyalgia rheumatica, OA, DDD, DJD, kyphosis    pt was a stroke alert, neuro ICU, telemetry  CE negative, ECHO P  CT of the head negative for acute pathology  CT angio of the brain WNL  CT angio of the neck + atherosclerosis, L internal carotid 30-40% stenosis without sig flow obs  MRI of the brain P  pt on atorvastatin 80mg,  mg  pt ff by neurology  monitor and correct the electrolytes

## 2018-05-18 NOTE — PROGRESS NOTE ADULT - SUBJECTIVE AND OBJECTIVE BOX
ARIAS PUGA 87yo WF  Female from home awakened with dizzyness and  "room spinning".  Pt also was noted to have delayed responses to the family's questions.  Pt denies CP, palpitations, N/V, LOC  or dysarthria.  Pt went to PMD and was sent to the ER for further evaluation.  The was a stroke alert with out full stroke code and was admitted to neuro ICU for w/up and observation. The pt also c/o rash/hives from  use of new cream.  The EKG showed no acute changes, preliminary CE negative, CXR normal, CT of the head showed no acute changes, CT angio of the neck showed generalized atherosclerosis and a L 30-40% focal stenosis of L internal carotid without significant stenosis, CT angio of the head was negative for any vascular stenosis. The pt is being ff by neuro. The pt has an MRI of the brain p and an ECHO.  The PMHx includes:  HTN, AS, Hyperlipidemia,  ASHD, Hypothyroidism, OA, polymyalgia rheumatic,  DDD, DJD, Th kyphosis, mild anemia.    INTERVAL HPI/OVERNIGHT EVENTS:  pt seen in neuro ICU, medically stable,     MEDICATIONS  (STANDING):  amLODIPine   Tablet 5 milliGRAM(s) Oral daily  aspirin enteric coated 162 milliGRAM(s) Oral daily  atorvastatin 80 milliGRAM(s) Oral at bedtime  citalopram 20 milliGRAM(s) Oral daily  clobetasol 0.05% Cream 1 Application(s) Topical two times a day  enoxaparin Injectable 40 milliGRAM(s) SubCutaneous daily  hydrocortisone 1% Cream 1 Application(s) Topical two times a day  levothyroxine  Oral Tab/Cap - Peds 50 MICROGram(s) Oral daily  pantoprazole    Tablet 40 milliGRAM(s) Oral before breakfast  potassium chloride   Powder 40 milliEquivalent(s) Oral once    MEDICATIONS  (PRN):  acetaminophen   Tablet 650 milliGRAM(s) Oral every 6 hours PRN For Temp greater than 38 C (100.4 F)      Allergies    penicillins (Unknown)  sulfonamides (Unknown)    Vital Signs Last 24 Hrs    T(F): 96.9  HR: 72  BP: 120/58    RR: 18  SpO2: 98    PHYSICAL EXAM:      Constitutional:  elderly WF, alert, oriented fully verbal, in NAD    Eyes: nrmal, R eye abduction (chronic fx)    ENMT: dry oral mucosa, tongue is mid line, no tremor    Neck:  supple, no JVD, no bruits    Back: mild TH kyphosis    Respiratory:  shallow respirations, good air entry    Cardiovascular:  S1S2 reg, II/VI CIERA    Gastrointestinal:  globose soft and benign    Genitourinary:  no wilhelm    Extremities:  moves all limbs, + arthritic changes, no edema    Neurological:  no deficits, good limb motor strength    Skin: hives    Lymph Nodes:  not enlarge            LABS:                        9.5    6.44  )-----------( 265      ( 17 May 2018 09:13 )             27.5     05-17    142  |  103  |  19  ----------------------------<  126<H>  3.2<L>   |  29  |  1.1    Ca    9.5      17 May 2018 09:13  Mg     1.7     05-17  GFR 51, 45  CE negative  TPro  6.0  /  Alb  3.7  /  TBili  0.8  /  DBili  x   /  AST  16  /  ALT  8   /  AlkPhos  76  05-17    PT/INR - ( 16 May 2018 16:20 )   PT: 11.00 sec;   INR: 1.02 ratio         PTT - ( 16 May 2018 16:20 )  PTT:25.4 sec      RADIOLOGY & ADDITIONAL TESTS:  EKG  NSR 83/min, 1 deg AVB, no acute changes, PRWP,     CXR:  clear    CT of the head:  prominent ventricles, mild cerebral arophy, + white matter changes, no acute intracranila hemorrhage    CT angio of the head:  patent cerebral vasculature    CT angio of the neck:  gen atherosclerosis of the aortic arch and the carotids, codominant vertebral,  with 30-40% L internal carotid a stenosis but no significant obstruction to the vascular flow    EEB borderline to mild gen slowing    MR angio of the brain unremarkable    MR angio of the neck < 50% L internal carotid stenosis    MRI of the brain no acute recent infarct or hemorrhage,  moderate white matter changes, sm chronic cerebellar infarcts

## 2018-05-18 NOTE — PROGRESS NOTE ADULT - SUBJECTIVE AND OBJECTIVE BOX
SUBJECTIVE:    Patient is a 86y old  Female who presents with a chief complaint of slurred speech, dizzyness (18 May 2018 14:54)    Currently admitted to medicine with the primary diagnosis of Syncope, unspecified syncope type     Today is hospital day 2d. This morning she is resting comfortably in bed and reports no new issues or overnight events.     PAST MEDICAL & SURGICAL HISTORY  PAST MEDICAL & SURGICAL HISTORY:  Anemia  Aortic stenosis  Flaco's disease  Hypothyroid  HTN (hypertension)  Hip fracture: s/p right ORIF    SOCIAL HISTORY:    ALLERGIES:  penicillins (Unknown)  sulfonamides (Unknown)    MEDICATIONS:  STANDING MEDICATIONS  amLODIPine   Tablet 5 milliGRAM(s) Oral daily  aspirin enteric coated 162 milliGRAM(s) Oral daily  atorvastatin 80 milliGRAM(s) Oral at bedtime  citalopram 20 milliGRAM(s) Oral daily  clobetasol 0.05% Cream 1 Application(s) Topical two times a day  enoxaparin Injectable 40 milliGRAM(s) SubCutaneous daily  hydrocortisone 1% Cream 1 Application(s) Topical two times a day  levothyroxine  Oral Tab/Cap - Peds 50 MICROGram(s) Oral daily  pantoprazole    Tablet 40 milliGRAM(s) Oral before breakfast    PRN MEDICATIONS  acetaminophen   Tablet 650 milliGRAM(s) Oral every 6 hours PRN    VITALS:   T(F): 96.9  HR: 72  BP: 120/58  RR: 18  SpO2: --    LABS:                        9.5    6.44  )-----------( 265      ( 17 May 2018 09:13 )             27.5     05-17    142  |  103  |  19  ----------------------------<  126<H>  3.2<L>   |  29  |  1.1    Ca    9.5      17 May 2018 09:13  Mg     1.7     05-17    TPro  6.0  /  Alb  3.7  /  TBili  0.8  /  DBili  x   /  AST  16  /  ALT  8   /  AlkPhos  76  05-17    PT/INR - ( 16 May 2018 16:20 )   PT: 11.00 sec;   INR: 1.02 ratio         PTT - ( 16 May 2018 16:20 )  PTT:25.4 sec          CARDIAC MARKERS ( 16 May 2018 16:20 )  x     / <0.01 ng/mL / 53 U/L / x     / 1.5 ng/mL      RADIOLOGY:    PHYSICAL EXAM:    AAOx3. Cranial nerves: intact  PERRL. right eye chronically abducted.  	Motor examination:  5/5 UE; 5/5 LE.  No observable drift.    Cerebellum:     No dysmetria or dysdiadokinesia.

## 2018-05-18 NOTE — PROGRESS NOTE ADULT - SUBJECTIVE AND OBJECTIVE BOX
ARIAS PUGA    Chief complaint:  Dizziness.    Right handed    HPI:  87 yo right handed woman with a PMH of HTN, anemia, hypothyroidism present to the ED after waking up with room spinning dizziness which isn't uncommon for her. As per family she was also taking longer to answer questions. She was persistently dizzy and went to the PMD, who referred her to the ER. On exam dizziness has subsided and her NIHSS was 0, therefore no IV TPA was given. MRI brain showed no acute ischemic change.    Relevant PMH:  [] Prior ischemic stroke/TIA  [] Afib  []CAD  [x]HTN  []DLD  []DM []PVD []Obesity [] Sedintary lifestyle []CHF  []VALE []Cancer Hx     Social History: [] Smoking []  Drug Use: []   Alcohol Use:   [] Other:      Possible Location of Stroke:  Unknown etiology of symptom location at this time.   Possible Cause of Stroke:  Unknown symptom etiology less likely TIA or stroke.  Relavant Cervicocerebral Imaging:  Impression:    CTA neck: Mild (approximately 30-40%) focal stenosis of the proximal left   internal carotidartery at the bifurcation.    No significant right internal carotid artery stenosis.    CTA head: No significant vascular stenosis within the head.    Relevant blood tests:  Direct LDL: 74 mg/dL [4 - 129] (18 @ 09:46)      Relevant cardiac rhythm monitorin hours of telemetry monitoring and no reported events.  Relevant Cardiac Structure:(TTE/AMRIT +/-):No intracardiac thrombus/vegetation/akynesia/EF:  Pending TTE      Home Medications:  ALPRAZolam 0.25 mg oral tablet:  (16 May 2018 15:48)  CeleXA 20 mg oral tablet: 1 tab(s) orally once a day (16 May 2018 15:48)  clobetasol 0.05% topical cream:  (16 May 2018 15:48)  hydroCHLOROthiazide 25 mg oral tablet: 1 tab(s) orally once a day (16 May 2018 18:59)  Prolia 60 mg/mL subcutaneous solution:  (16 May 2018 15:48)  Synthroid 50 mcg (0.05 mg) oral tablet:  (16 May 2018 15:48)  Uloric 40 mg oral tablet:  (16 May 2018 15:48)  Zocor 40 mg oral tablet: 1 tab(s) orally once a day (at bedtime) (16 May 2018 15:48)      MEDICATIONS  (STANDING):  amLODIPine   Tablet 5 milliGRAM(s) Oral daily  aspirin enteric coated 162 milliGRAM(s) Oral daily  atorvastatin 80 milliGRAM(s) Oral at bedtime  citalopram 20 milliGRAM(s) Oral daily  clobetasol 0.05% Cream 1 Application(s) Topical two times a day  enoxaparin Injectable 40 milliGRAM(s) SubCutaneous daily  hydrocortisone 1% Cream 1 Application(s) Topical two times a day  levothyroxine  Oral Tab/Cap - Peds 50 MICROGram(s) Oral daily  pantoprazole    Tablet 40 milliGRAM(s) Oral before breakfast      PT/OT/Speech/Rehab/S&Swr: Out patient Rehab.    Exam:    Vital Signs Last 24 Hrs  T(C): 36.1 (18 May 2018 07:34), Max: 37.1 (17 May 2018 15:54)  T(F): 96.9 (18 May 2018 07:34), Max: 98.7 (17 May 2018 15:54)  HR: 72 (18 May 2018 07:34) (64 - 75)  BP: 120/58 (18 May 2018 07:34) (103/51 - 120/59)  BP(mean): --  RR: 18 (18 May 2018 07:34) (18 - 18)  SpO2: --    NIHSS      LOC:      1a:   0 1b(Questions):    0    1c(Instructions):   0         Best Gaze:  Visual:  Motor:                 RUE:  0  RLE:   0 LUE: 0   LLE:  0  FACE:   0  Limb Ataxia:0  Sensory:     0  Language:     0  Dysarthria:        0  Extinction and Inattention:0    NIHSS on admission:       0 NIHSS yesterday:  0       NIHSS today:      0      m-RS:0    Impression:  87 yo right handed woman with a PMH of HTN, anemia, hypothyroidism present to the ED after waking up with unsteadiness but not room spinning dizziness. As per family she was also taking longer to answer questions. She was persistently dizzy and went to the ER. On exam unsteadiness has resolved and her NIHSS is 0. She is now cared for in the stroke unit with resolved unsteadiness. MRI Brain showed no acute ischemic change  Suggestion:  TTE with contrast to rule out right to left shunt   Continue ASA  Continue Lipitor  May consider orthostatic VS check.  PT OT Rehab eval.    - We spent more than 60 minutes to review the chart, gather necessary information, evaluate the patient and discuss the case with primary team and counseling the family to their satisfaction.  Tyson Bass NP Neurovascular/Stroke  [id number] ARIAS PUGA    Chief complaint:  Dizziness.    Right handed    HPI:  89 yo right handed woman with a PMH of HTN, anemia, hypothyroidism present to the ED after waking up with room spinning dizziness which isn't uncommon for her. As per family she was also taking longer to answer questions. She was persistently dizzy and went to the PMD, who referred her to the ER. On exam dizziness has subsided and her NIHSS was 0, therefore no IV TPA was given. MRI brain showed no acute ischemic change.    Relevant PMH:  [] Prior ischemic stroke/TIA  [] Afib  []CAD  [x]HTN  []DLD  []DM []PVD []Obesity [] Sedintary lifestyle []CHF  []VALE []Cancer Hx     Social History: [] Smoking []  Drug Use: []   Alcohol Use:   [] Other:      Possible Location of Stroke:  Unknown etiology of symptom location at this time.   Possible Cause of Stroke:  Unknown symptom etiology less likely TIA or stroke.  [facility] Cervicocerebral Imaging:  Impression:    CTA neck: Mild (approximately 30-40%) focal stenosis of the proximal left   internal carotidartery at the bifurcation.    No significant right internal carotid artery stenosis.    CTA head: No significant vascular stenosis within the head.    Relevant blood tests:  Direct LDL: 74 mg/dL [4 - 129] (18 @ 09:46)      Relevant cardiac rhythm monitorin hours of telemetry monitoring and no reported events.  Relevant Cardiac Structure:(TTE/AMRIT +/-):No intracardiac thrombus/vegetation/akynesia/EF:  Pending TTE      Home Medications:  ALPRAZolam 0.25 mg oral tablet:  (16 May 2018 15:48)  CeleXA 20 mg oral tablet: 1 tab(s) orally once a day (16 May 2018 15:48)  clobetasol 0.05% topical cream:  (16 May 2018 15:48)  hydroCHLOROthiazide 25 mg oral tablet: 1 tab(s) orally once a day (16 May 2018 18:59)  Prolia 60 mg/mL subcutaneous solution:  (16 May 2018 15:48)  Synthroid 50 mcg (0.05 mg) oral tablet:  (16 May 2018 15:48)  Uloric 40 mg oral tablet:  (16 May 2018 15:48)  Zocor 40 mg oral tablet: 1 tab(s) orally once a day (at bedtime) (16 May 2018 15:48)      MEDICATIONS  (STANDING):  amLODIPine   Tablet 5 milliGRAM(s) Oral daily  aspirin enteric coated 162 milliGRAM(s) Oral daily  atorvastatin 80 milliGRAM(s) Oral at bedtime  citalopram 20 milliGRAM(s) Oral daily  clobetasol 0.05% Cream 1 Application(s) Topical two times a day  enoxaparin Injectable 40 milliGRAM(s) SubCutaneous daily  hydrocortisone 1% Cream 1 Application(s) Topical two times a day  levothyroxine  Oral Tab/Cap - Peds 50 MICROGram(s) Oral daily  pantoprazole    Tablet 40 milliGRAM(s) Oral before breakfast      PT/OT/Speech/Rehab/S&Swr: Out patient Rehab.    Exam:    Vital Signs Last 24 Hrs  T(C): 36.1 (18 May 2018 07:34), Max: 37.1 (17 May 2018 15:54)  T(F): 96.9 (18 May 2018 07:34), Max: 98.7 (17 May 2018 15:54)  HR: 72 (18 May 2018 07:34) (64 - 75)  BP: 120/58 (18 May 2018 07:34) (103/51 - 120/59)  BP(mean): --  RR: 18 (18 May 2018 07:34) (18 - 18)  SpO2: --    NIHSS      LOC:      1a:   0 1b(Questions):    0    1c(Instructions):   0         Best Gaze:  Visual:  Motor:                 RUE:  0  RLE:   0 LUE: 0   LLE:  0  FACE:   0  Limb Ataxia:0  Sensory:     0  Language:     0  Dysarthria:        0  Extinction and Inattention:0    NIHSS on admission:       0 NIHSS yesterday:  0       NIHSS today:      0      m-RS:0    Impression:  89 yo right handed woman with a PMH of HTN, anemia, hypothyroidism present to the ED after waking up with unsteadiness but not room spinning dizziness. As per family she was also taking longer to answer questions. She was persistently dizzy and went to the ER. On exam unsteadiness has resolved and her NIHSS is 0. She is now cared for in the stroke unit with resolved unsteadiness. MRI Brain showed no acute ischemic change. Symptom etiology unknown, may be due to orthostatic changes.   Suggestion:  Continue ASA  Continue Lipitor  May consider orthostatic VS check.  PT OT Rehab eval.    Disposition: Can follow up with the stroke clinic in 2-4 weeks.    - We spent more than 60 minutes to review the chart, gather necessary information, evaluate the patient and discuss the case with primary team and counseling the family to their satisfaction.  Tyson Bass NP Neurovascular/Stroke  [id number] ARIAS PUGA    Chief complaint:  Dizziness.    Right handed    HPI:  89 yo right handed woman with a PMH of HTN, anemia, hypothyroidism present to the ED after waking up with room spinning dizziness which isn't uncommon for her. As per family she was also taking longer to answer questions. She was persistently dizzy and went to the PMD, who referred her to the ER. On exam dizziness has subsided and her NIHSS was 0, therefore no IV TPA was given. MRI brain showed no acute ischemic change.    Relevant PMH:  [] Prior ischemic stroke/TIA  [] Afib  []CAD  [x]HTN  []DLD  []DM []PVD []Obesity [] Sedintary lifestyle []CHF  []VALE []Cancer Hx     Social History: [] Smoking []  Drug Use: []   Alcohol Use:   [] Other:      Possible Location of Stroke:  Unknown etiology of symptom location at this time.   Possible Cause of Stroke:  Unknown symptom etiology less likely TIA or stroke.  Relav[facility] Cervicocerebral Imaging:  Impression:    CTA neck: Mild (approximately 30-40%) focal stenosis of the proximal left   internal carotidartery at the bifurcation.    No significant right internal carotid artery stenosis.    CTA head: No significant vascular stenosis within the head.    Relevant blood tests:  Direct LDL: 74 mg/dL [4 - 129] (18 @ 09:46)      Relevant cardiac rhythm monitorin hours of telemetry monitoring and no reported events.  Relevant Cardiac Structure:(TTE/AMRIT +/-):No intracardiac thrombus/vegetation/akynesia/EF:  Pending TTE      Home Medications:  ALPRAZolam 0.25 mg oral tablet:  (16 May 2018 15:48)  CeleXA 20 mg oral tablet: 1 tab(s) orally once a day (16 May 2018 15:48)  clobetasol 0.05% topical cream:  (16 May 2018 15:48)  hydroCHLOROthiazide 25 mg oral tablet: 1 tab(s) orally once a day (16 May 2018 18:59)  Prolia 60 mg/mL subcutaneous solution:  (16 May 2018 15:48)  Synthroid 50 mcg (0.05 mg) oral tablet:  (16 May 2018 15:48)  Uloric 40 mg oral tablet:  (16 May 2018 15:48)  Zocor 40 mg oral tablet: 1 tab(s) orally once a day (at bedtime) (16 May 2018 15:48)      MEDICATIONS  (STANDING):  amLODIPine   Tablet 5 milliGRAM(s) Oral daily  aspirin enteric coated 162 milliGRAM(s) Oral daily  atorvastatin 80 milliGRAM(s) Oral at bedtime  citalopram 20 milliGRAM(s) Oral daily  clobetasol 0.05% Cream 1 Application(s) Topical two times a day  enoxaparin Injectable 40 milliGRAM(s) SubCutaneous daily  hydrocortisone 1% Cream 1 Application(s) Topical two times a day  levothyroxine  Oral Tab/Cap - Peds 50 MICROGram(s) Oral daily  pantoprazole    Tablet 40 milliGRAM(s) Oral before breakfast      PT/OT/Speech/Rehab/S&Swr: Out patient Rehab.    Exam:    Vital Signs Last 24 Hrs  T(C): 36.1 (18 May 2018 07:34), Max: 37.1 (17 May 2018 15:54)  T(F): 96.9 (18 May 2018 07:34), Max: 98.7 (17 May 2018 15:54)  HR: 72 (18 May 2018 07:34) (64 - 75)  BP: 120/58 (18 May 2018 07:34) (103/51 - 120/59)  BP(mean): --  RR: 18 (18 May 2018 07:34) (18 - 18)  SpO2: --    NIHSS      LOC:      1a:   0 1b(Questions):    0    1c(Instructions):   0         Best Gaze:  Visual:  Motor:                 RUE:  0  RLE:   0 LUE: 0   LLE:  0  FACE:   0  Limb Ataxia:0  Sensory:     0  Language:     0  Dysarthria:        0  Extinction and Inattention:0    NIHSS on admission:       0 NIHSS yesterday:  0       NIHSS today:      0      m-RS:0    Impression:  89 yo right handed woman with a PMH of HTN, anemia, hypothyroidism present to the ED after waking up with unsteadiness but not room spinning dizziness. As per family she was also taking longer to answer questions. She was persistently dizzy and went to the ER. On exam unsteadiness has resolved and her NIHSS is 0. She is now cared for in the stroke unit with resolved unsteadiness. MRI Brain showed no acute ischemic change. Symptom etiology unknown, may be due to orthostatic changes.   Suggestion:  Continue ASA  Continue Lipitor  May consider orthostatic VS check.  PT OT Rehab eval.    Disposition: Can follow up with the stroke clinic in 2-4 weeks.    - We spent more than 60 minutes to review the chart, gather necessary information, evaluate the patient and discuss the case with primary team and counseling the family to their satisfaction.  Tyson Bass NP Neurovascular/Stroke  [id number]    I, Lawrence Adair, examined the patient and discussed this case, including plan of management with the Stroke team.

## 2018-05-18 NOTE — DISCHARGE NOTE ADULT - PLAN OF CARE
prevent complications follow up in stroke clinic in 2 weeks, continue taking cholesterol medicine and aspirin, follow up in stroke clinic in 2 weeks followup with Dr. Oneill in 2 weeks

## 2018-05-18 NOTE — DISCHARGE NOTE ADULT - CARE PLAN
Principal Discharge DX:	Syncope, unspecified syncope type  Goal:	prevent complications  Assessment and plan of treatment:	follow up in stroke clinic in 2 weeks, continue taking cholesterol medicine and aspirin, follow up in stroke clinic in 2 weeks  Secondary Diagnosis:	Aortic stenosis  Goal:	prevent complications  Assessment and plan of treatment:	followup with Dr. Oneill in 2 weeks

## 2018-05-22 DIAGNOSIS — I35.0 NONRHEUMATIC AORTIC (VALVE) STENOSIS: ICD-10-CM

## 2018-05-22 DIAGNOSIS — Z79.52 LONG TERM (CURRENT) USE OF SYSTEMIC STEROIDS: ICD-10-CM

## 2018-05-22 DIAGNOSIS — R42 DIZZINESS AND GIDDINESS: ICD-10-CM

## 2018-05-22 DIAGNOSIS — M40.209 UNSPECIFIED KYPHOSIS, SITE UNSPECIFIED: ICD-10-CM

## 2018-05-22 DIAGNOSIS — Z87.312 PERSONAL HISTORY OF (HEALED) STRESS FRACTURE: ICD-10-CM

## 2018-05-22 DIAGNOSIS — L11.1 TRANSIENT ACANTHOLYTIC DERMATOSIS [GROVER]: ICD-10-CM

## 2018-05-22 DIAGNOSIS — I10 ESSENTIAL (PRIMARY) HYPERTENSION: ICD-10-CM

## 2018-05-22 DIAGNOSIS — Z88.2 ALLERGY STATUS TO SULFONAMIDES: ICD-10-CM

## 2018-05-22 DIAGNOSIS — R26.81 UNSTEADINESS ON FEET: ICD-10-CM

## 2018-05-22 DIAGNOSIS — I25.10 ATHEROSCLEROTIC HEART DISEASE OF NATIVE CORONARY ARTERY WITHOUT ANGINA PECTORIS: ICD-10-CM

## 2018-05-22 DIAGNOSIS — R47.81 SLURRED SPEECH: ICD-10-CM

## 2018-05-22 DIAGNOSIS — M19.90 UNSPECIFIED OSTEOARTHRITIS, UNSPECIFIED SITE: ICD-10-CM

## 2018-05-22 DIAGNOSIS — R55 SYNCOPE AND COLLAPSE: ICD-10-CM

## 2018-05-22 DIAGNOSIS — E03.9 HYPOTHYROIDISM, UNSPECIFIED: ICD-10-CM

## 2018-05-22 DIAGNOSIS — E87.6 HYPOKALEMIA: ICD-10-CM

## 2018-05-22 DIAGNOSIS — M35.3 POLYMYALGIA RHEUMATICA: ICD-10-CM

## 2018-05-22 DIAGNOSIS — Z88.0 ALLERGY STATUS TO PENICILLIN: ICD-10-CM

## 2018-12-16 ENCOUNTER — EMERGENCY (EMERGENCY)
Facility: HOSPITAL | Age: 83
LOS: 0 days | Discharge: HOME | End: 2018-12-16
Attending: EMERGENCY MEDICINE | Admitting: EMERGENCY MEDICINE

## 2018-12-16 VITALS
RESPIRATION RATE: 19 BRPM | WEIGHT: 121.03 LBS | DIASTOLIC BLOOD PRESSURE: 65 MMHG | OXYGEN SATURATION: 100 % | HEART RATE: 74 BPM | HEIGHT: 62 IN | SYSTOLIC BLOOD PRESSURE: 146 MMHG | TEMPERATURE: 98 F
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
## 2018-12-16 DIAGNOSIS — Z88.2 ALLERGY STATUS TO SULFONAMIDES: ICD-10-CM

## 2018-12-16 DIAGNOSIS — I10 ESSENTIAL (PRIMARY) HYPERTENSION: ICD-10-CM

## 2018-12-16 DIAGNOSIS — E03.9 HYPOTHYROIDISM, UNSPECIFIED: ICD-10-CM

## 2018-12-16 DIAGNOSIS — Z23 ENCOUNTER FOR IMMUNIZATION: ICD-10-CM

## 2018-12-16 DIAGNOSIS — Z88.0 ALLERGY STATUS TO PENICILLIN: ICD-10-CM

## 2018-12-16 DIAGNOSIS — S72.009A FRACTURE OF UNSPECIFIED PART OF NECK OF UNSPECIFIED FEMUR, INITIAL ENCOUNTER FOR CLOSED FRACTURE: Chronic | ICD-10-CM

## 2018-12-16 DIAGNOSIS — Z79.82 LONG TERM (CURRENT) USE OF ASPIRIN: ICD-10-CM

## 2018-12-16 DIAGNOSIS — S42.212A UNSPECIFIED DISPLACED FRACTURE OF SURGICAL NECK OF LEFT HUMERUS, INITIAL ENCOUNTER FOR CLOSED FRACTURE: ICD-10-CM

## 2018-12-16 DIAGNOSIS — M25.512 PAIN IN LEFT SHOULDER: ICD-10-CM
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
## 2018-12-16 DIAGNOSIS — S81.812A LACERATION WITHOUT FOREIGN BODY, LEFT LOWER LEG, INITIAL ENCOUNTER: ICD-10-CM
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
## 2018-12-16 PROBLEM — I35.0 NONRHEUMATIC AORTIC (VALVE) STENOSIS: Chronic | Status: ACTIVE | Noted: 2018-05-16

## 2018-12-16 PROBLEM — D64.9 ANEMIA, UNSPECIFIED: Chronic | Status: ACTIVE | Noted: 2018-05-16

## 2018-12-16 PROBLEM — L11.1 TRANSIENT ACANTHOLYTIC DERMATOSIS [GROVER]: Chronic | Status: ACTIVE | Noted: 2018-05-16

## 2018-12-16 LAB
ALBUMIN SERPL ELPH-MCNC: 4.3 G/DL — SIGNIFICANT CHANGE UP (ref 3.5–5.2)
ALP SERPL-CCNC: 159 U/L — HIGH (ref 30–115)
ALT FLD-CCNC: 26 U/L — SIGNIFICANT CHANGE UP (ref 0–41)
ANION GAP SERPL CALC-SCNC: 12 MMOL/L — SIGNIFICANT CHANGE UP (ref 7–14)
AST SERPL-CCNC: 31 U/L — SIGNIFICANT CHANGE UP (ref 0–41)
BASOPHILS # BLD AUTO: 0.07 K/UL — SIGNIFICANT CHANGE UP (ref 0–0.2)
BASOPHILS NFR BLD AUTO: 0.5 % — SIGNIFICANT CHANGE UP (ref 0–1)
BILIRUB SERPL-MCNC: 0.7 MG/DL — SIGNIFICANT CHANGE UP (ref 0.2–1.2)
BUN SERPL-MCNC: 32 MG/DL — HIGH (ref 10–20)
CALCIUM SERPL-MCNC: 10.1 MG/DL — SIGNIFICANT CHANGE UP (ref 8.5–10.1)
CHLORIDE SERPL-SCNC: 102 MMOL/L — SIGNIFICANT CHANGE UP (ref 98–110)
CK SERPL-CCNC: 61 U/L — SIGNIFICANT CHANGE UP (ref 0–225)
CO2 SERPL-SCNC: 27 MMOL/L — SIGNIFICANT CHANGE UP (ref 17–32)
CREAT SERPL-MCNC: 1 MG/DL — SIGNIFICANT CHANGE UP (ref 0.7–1.5)
EOSINOPHIL # BLD AUTO: 0.48 K/UL — SIGNIFICANT CHANGE UP (ref 0–0.7)
EOSINOPHIL NFR BLD AUTO: 3.5 % — SIGNIFICANT CHANGE UP (ref 0–8)
GLUCOSE SERPL-MCNC: 105 MG/DL — HIGH (ref 70–99)
HCT VFR BLD CALC: 32.1 % — LOW (ref 37–47)
HGB BLD-MCNC: 11 G/DL — LOW (ref 12–16)
IMM GRANULOCYTES NFR BLD AUTO: 0.8 % — HIGH (ref 0.1–0.3)
LYMPHOCYTES # BLD AUTO: 1.29 K/UL — SIGNIFICANT CHANGE UP (ref 1.2–3.4)
LYMPHOCYTES # BLD AUTO: 9.4 % — LOW (ref 20.5–51.1)
MCHC RBC-ENTMCNC: 32.3 PG — HIGH (ref 27–31)
MCHC RBC-ENTMCNC: 34.3 G/DL — SIGNIFICANT CHANGE UP (ref 32–37)
MCV RBC AUTO: 94.1 FL — SIGNIFICANT CHANGE UP (ref 81–99)
MONOCYTES # BLD AUTO: 1 K/UL — HIGH (ref 0.1–0.6)
MONOCYTES NFR BLD AUTO: 7.3 % — SIGNIFICANT CHANGE UP (ref 1.7–9.3)
NEUTROPHILS # BLD AUTO: 10.72 K/UL — HIGH (ref 1.4–6.5)
NEUTROPHILS NFR BLD AUTO: 78.5 % — HIGH (ref 42.2–75.2)
NRBC # BLD: 0 /100 WBCS — SIGNIFICANT CHANGE UP (ref 0–0)
PLATELET # BLD AUTO: 277 K/UL — SIGNIFICANT CHANGE UP (ref 130–400)
POTASSIUM SERPL-MCNC: 3.5 MMOL/L — SIGNIFICANT CHANGE UP (ref 3.5–5)
POTASSIUM SERPL-SCNC: 3.5 MMOL/L — SIGNIFICANT CHANGE UP (ref 3.5–5)
PROT SERPL-MCNC: 7 G/DL — SIGNIFICANT CHANGE UP (ref 6–8)
RBC # BLD: 3.41 M/UL — LOW (ref 4.2–5.4)
RBC # FLD: 13.2 % — SIGNIFICANT CHANGE UP (ref 11.5–14.5)
SODIUM SERPL-SCNC: 141 MMOL/L — SIGNIFICANT CHANGE UP (ref 135–146)
TROPONIN T SERPL-MCNC: <0.01 NG/ML — SIGNIFICANT CHANGE UP
WBC # BLD: 13.67 K/UL — HIGH (ref 4.8–10.8)
WBC # FLD AUTO: 13.67 K/UL — HIGH (ref 4.8–10.8)

## 2018-12-16 RX ORDER — ACETAMINOPHEN 500 MG
650 TABLET ORAL ONCE
Qty: 0 | Refills: 0 | Status: COMPLETED | OUTPATIENT
Start: 2018-12-16 | End: 2018-12-16

## 2018-12-16 RX ORDER — TETANUS TOXOID, REDUCED DIPHTHERIA TOXOID AND ACELLULAR PERTUSSIS VACCINE, ADSORBED 5; 2.5; 8; 8; 2.5 [IU]/.5ML; [IU]/.5ML; UG/.5ML; UG/.5ML; UG/.5ML
0.5 SUSPENSION INTRAMUSCULAR ONCE
Qty: 0 | Refills: 0 | Status: COMPLETED | OUTPATIENT
Start: 2018-12-16 | End: 2018-12-16

## 2018-12-16 RX ADMIN — TETANUS TOXOID, REDUCED DIPHTHERIA TOXOID AND ACELLULAR PERTUSSIS VACCINE, ADSORBED 0.5 MILLILITER(S): 5; 2.5; 8; 8; 2.5 SUSPENSION INTRAMUSCULAR at 16:02

## 2018-12-16 RX ADMIN — Medication 650 MILLIGRAM(S): at 16:02

## 2018-12-16 NOTE — ED PROVIDER NOTE - PROGRESS NOTE DETAILS
discussed xray with Dr. Pinzon. states pt can follow up in the office.   discussed dispo with family. pt ambulated without difficulty or pain. pt and family comfortable with plan to d/c home with close follow up. strict return precuations discussed.

## 2018-12-16 NOTE — ED PROVIDER NOTE - PHYSICAL EXAMINATION
VITAL SIGNS: I have reviewed nursing notes and confirm.  CONSTITUTIONAL: Well-developed; well-nourished; in no acute distress.  SKIN: Skin exam is warm and dry, <2 sec cap refill, superficial skin tear to lateral aspect of L lower extremity. bleeding controlled.   HEAD: Normocephalic; atraumatic.  EYES: PERRL, EOM intact; normal conjunctiva.  ENT: MMM; airway clear.   NECK: Supple; non tender.  CARD: RRR, 2+ radial pulses  RESP: No wheezes, rales or rhonchi, speaking in full sentences  ABD: soft non tender.   EXT: L upper extremity TTP. glenohumeral joint TTP. NVI. No edema.  NEURO: Alert, oriented. Grossly unremarkable. No focal deficits.  PSYCH: Cooperative, appropriate.

## 2018-12-16 NOTE — ED PROVIDER NOTE - CARE PROVIDER_API CALL
Dalton Pinzon), Orthopaedic Surgery  [address] [place] [id number]  Phone: [phone]  Fax: [phone]

## 2018-12-16 NOTE — ED PROVIDER NOTE - ATTENDING CONTRIBUTION TO CARE
I personally evaluated the patient. I reviewed the Resident’s or Physician Assistant’s note (as assigned above), and agree with the findings and plan except as documented in my note. 88yo F sustained a fall, unsure of mechanism, laid on floor for about an hour. c/o left shoulder pain. No LOC. No vomiting, no HA, no visual changes, no CP, SOB. On exam: NCAT. PERRLA, EOMI. OP clear. Lungs CTAB. RRR, S1S2 noted. Radial pulses 2+ and equal, pedal pulses 2+ and equal. Abdomen soft, NT/ND, no rebound or guarding. No focal neuro deficits.  Skin tear on right lower leg, lateral aspect. Swelling to left shoulder rad pulses intact and deltoid sensation intact

## 2018-12-16 NOTE — ED PROVIDER NOTE - MEDICAL DECISION MAKING DETAILS
I personally evaluated the patient. I reviewed the Resident’s or Physician Assistant’s note (as assigned above), and agree with the findings and plan except as documented in my note. 86yo F sustained a fall, unsure of mechanism, laid on floor for about an hour. c/o left shoulder pain. No LOC. No vomiting, no HA, no visual changes, no CP, SOB. On exam: NCAT. PERRLA, EOMI. OP clear. Lungs CTAB. RRR, S1S2 noted. Radial pulses 2+ and equal, pedal pulses 2+ and equal. Abdomen soft, NT/ND, no rebound or guarding. No focal neuro deficits.  Skin tear on right lower leg, lateral aspect. Swelling to left shoulder rad pulses intact and deltoid sensation intact

## 2018-12-16 NOTE — ED ADULT TRIAGE NOTE - CHIEF COMPLAINT QUOTE
BIBA from home "As per patient I fell inside, I'm not sure how I fell". As per patient son "She was on the floor for about an hour and when I got there she was faced down and there was vomit on the floor" c/o left shoulder pain. denies loc, denies AC

## 2018-12-16 NOTE — ED PROVIDER NOTE - NS ED ROS FT
Review of Systems:  CONSTITUTIONAL: no fever  EYES: no photophobia, no blurred vision  ENT: no ear pain, no nasal discharge  RESPIRATORY: no shortness of breath, no cough  CARDIAC: no chest pain, no palpitations  GI: no abd pain, no nausea, no vomiting, no diarrhea, no constipation,   MUSCULOSKELETAL: +L arm pain  NEUROLOGIC: no headache, no LOC  SKIN: +abrasion

## 2018-12-16 NOTE — ED PROVIDER NOTE - OBJECTIVE STATEMENT
88 y/o F, h/o hypothyroidism and HTN, presents c/o L shoulder pain s/p fall earlier today. Pt states she turned around after letting her dogs outside, lost her balance and fell forward. pt was feeling at baseline prior to the fall. pt was unable to stand up on her own. was on the ground for about an hour and a half until her son helped her to her feet. no medications taken pta. DEnies fever, chills, n/v, abd pain, headache, dizziness, LOC, CP, SOB. 88 y/o F, h/o hypothyroidism and HTN, takes a daily ASA, presents c/o L shoulder pain s/p fall earlier today. Pt states she turned around after letting her dogs outside, lost her balance and fell forward. pt was feeling at baseline prior to the fall. pt was unable to stand up on her own. was on the ground for about an hour and a half until her son helped her to her feet. no medications taken pta. DEnies fever, chills, n/v, abd pain, headache, dizziness, LOC, CP, SOB.

## 2018-12-17 NOTE — ED PROCEDURE NOTE - CPROC ED POST PROC CARE GUIDE1
Verbal/written post procedure instructions were given to patient/caregiver./Instructed patient/caregiver to follow-up with primary care physician./Instructed patient/caregiver regarding signs and symptoms of infection./Elevate the injured extremity as instructed./Keep the cast/splint/dressing clean and dry.
Keep the cast/splint/dressing clean and dry./Verbal/written post procedure instructions were given to patient/caregiver./Instructed patient/caregiver to follow-up with primary care physician./Instructed patient/caregiver regarding signs and symptoms of infection.

## 2021-01-04 NOTE — PATIENT PROFILE ADULT. - PRO INTERPRETER NEED 2
Subjective   Patient ID: Ml is a 29 year old female who presents today for prenatal visit.  She is of 23w3d gestation.  OB History    Para Term  AB Living   3 1 1 0 1 1   SAB TAB Ectopic Molar Multiple Live Births   0 1 0 0 0 1        positive fetal movement, No bleeding, No rupture of membranes, No uterine contractions  C/o lower abd pain and pulling when active and moving up the stairs.     ASSESSMENT:  Pregnancy at 23w3d weeks gestation.  Problem List Items Addressed This Visit        Digestive    Irritable bowel syndrome with diarrhea - Primary       Other    Pregnancy    History of pre-eclampsia in prior pregnancy, currently pregnant    History of postpartum hemorrhage, currently pregnant          PLAN:  Follow up visit in 4 weeks  reviewed support belt for abd discomfort  3T care reviewed; 3T labs at next visit  Reviewed continuing to take ASA    
English

## 2022-01-01 NOTE — PATIENT PROFILE ADULT. - FALL HARM RISK
[Normal Growth] : growth [Normal Development] : development  [No Elimination Concerns] : elimination [No Skin Concerns] : skin [Normal Sleep Pattern] : sleep [Parental (Maternal) Well-Being] : parental (maternal) well-being [Infant-Family Synchrony] : infant-family synchrony [Nutritional Adequacy] : nutritional adequacy [Infant Behavior] : infant behavior [Safety] : safety [Age Approp Vaccines] : Age appropriate vaccines administered [] : The components of the vaccine(s) to be administered today are listed in the plan of care. The disease(s) for which the vaccine(s) are intended to prevent and the risks have been discussed with the caretaker.  The risks are also included in the appropriate vaccination information statements which have been provided to the patient's caregiver.  The caregiver has given consent to vaccinate. [FreeTextEntry1] : discussed management of post. tongue tie. feeding and gaining well. refer to Dr. Gambino.  age(85 years old or older)/other/dizzyness

## 2023-02-27 NOTE — PHYSICAL THERAPY INITIAL EVALUATION ADULT - PATIENT PROFILE REVIEW, REHAB EVAL
How Severe Is Your Skin Discoloration?: mild
yes
yes/Orthostatics: sittin/76, HR: 70 bpm, standin/60, HR 80 bpm. No c/o of dizziness
Additional History: Patient would like assistance with dark circles under eyes, has daughters wedding coming up.

## 2024-07-04 ENCOUNTER — INPATIENT (INPATIENT)
Facility: HOSPITAL | Age: 89
LOS: 15 days | Discharge: HOSPICE | DRG: 193 | End: 2024-07-20
Attending: INTERNAL MEDICINE | Admitting: INTERNAL MEDICINE
Payer: MEDICARE

## 2024-07-04 VITALS
OXYGEN SATURATION: 95 % | SYSTOLIC BLOOD PRESSURE: 100 MMHG | DIASTOLIC BLOOD PRESSURE: 54 MMHG | TEMPERATURE: 98 F | HEART RATE: 79 BPM | RESPIRATION RATE: 18 BRPM

## 2024-07-04 DIAGNOSIS — J18.9 PNEUMONIA, UNSPECIFIED ORGANISM: ICD-10-CM

## 2024-07-04 DIAGNOSIS — S72.009A FRACTURE OF UNSPECIFIED PART OF NECK OF UNSPECIFIED FEMUR, INITIAL ENCOUNTER FOR CLOSED FRACTURE: Chronic | ICD-10-CM

## 2024-07-04 DIAGNOSIS — A41.9 SEPSIS, UNSPECIFIED ORGANISM: ICD-10-CM

## 2024-07-04 LAB
ALBUMIN SERPL ELPH-MCNC: 3.4 G/DL — LOW (ref 3.5–5.2)
ALP SERPL-CCNC: 112 U/L — SIGNIFICANT CHANGE UP (ref 30–115)
ALT FLD-CCNC: 12 U/L — SIGNIFICANT CHANGE UP (ref 0–41)
ANION GAP SERPL CALC-SCNC: 16 MMOL/L — HIGH (ref 7–14)
APPEARANCE UR: CLEAR — SIGNIFICANT CHANGE UP
APTT BLD: 29.9 SEC — SIGNIFICANT CHANGE UP (ref 27–39.2)
AST SERPL-CCNC: 23 U/L — SIGNIFICANT CHANGE UP (ref 0–41)
BASE EXCESS BLDV CALC-SCNC: 6.3 MMOL/L — HIGH (ref -2–3)
BASOPHILS # BLD AUTO: 0.04 K/UL — SIGNIFICANT CHANGE UP (ref 0–0.2)
BASOPHILS NFR BLD AUTO: 0.2 % — SIGNIFICANT CHANGE UP (ref 0–1)
BILIRUB SERPL-MCNC: 1.2 MG/DL — SIGNIFICANT CHANGE UP (ref 0.2–1.2)
BILIRUB UR-MCNC: ABNORMAL
BUN SERPL-MCNC: 37 MG/DL — HIGH (ref 10–20)
CA-I SERPL-SCNC: 1.13 MMOL/L — LOW (ref 1.15–1.33)
CALCIUM SERPL-MCNC: 9.6 MG/DL — SIGNIFICANT CHANGE UP (ref 8.4–10.5)
CHLORIDE SERPL-SCNC: 96 MMOL/L — LOW (ref 98–110)
CO2 SERPL-SCNC: 26 MMOL/L — SIGNIFICANT CHANGE UP (ref 17–32)
COLOR SPEC: SIGNIFICANT CHANGE UP
CREAT SERPL-MCNC: 1.4 MG/DL — SIGNIFICANT CHANGE UP (ref 0.7–1.5)
DIFF PNL FLD: NEGATIVE — SIGNIFICANT CHANGE UP
EGFR: 35 ML/MIN/1.73M2 — LOW
EOSINOPHIL # BLD AUTO: 0.05 K/UL — SIGNIFICANT CHANGE UP (ref 0–0.7)
EOSINOPHIL NFR BLD AUTO: 0.3 % — SIGNIFICANT CHANGE UP (ref 0–8)
GAS PNL BLDV: 133 MMOL/L — LOW (ref 136–145)
GAS PNL BLDV: SIGNIFICANT CHANGE UP
GAS PNL BLDV: SIGNIFICANT CHANGE UP
GLUCOSE BLDC GLUCOMTR-MCNC: 267 MG/DL — HIGH (ref 70–99)
GLUCOSE SERPL-MCNC: 141 MG/DL — HIGH (ref 70–99)
GLUCOSE UR QL: NEGATIVE MG/DL — SIGNIFICANT CHANGE UP
HCO3 BLDV-SCNC: 30 MMOL/L — HIGH (ref 22–29)
HCT VFR BLD CALC: 28 % — LOW (ref 37–47)
HCT VFR BLDA CALC: 51 % — HIGH (ref 34.5–46.5)
HGB BLD CALC-MCNC: 17.1 G/DL — HIGH (ref 11.7–16.1)
HGB BLD-MCNC: 8.9 G/DL — LOW (ref 12–16)
IMM GRANULOCYTES NFR BLD AUTO: 0.9 % — HIGH (ref 0.1–0.3)
INR BLD: 1.6 RATIO — HIGH (ref 0.65–1.3)
KETONES UR-MCNC: NEGATIVE MG/DL — SIGNIFICANT CHANGE UP
LACTATE BLDV-MCNC: 3.1 MMOL/L — HIGH (ref 0.5–2)
LACTATE SERPL-SCNC: 2 MMOL/L — SIGNIFICANT CHANGE UP (ref 0.7–2)
LEUKOCYTE ESTERASE UR-ACNC: NEGATIVE — SIGNIFICANT CHANGE UP
LYMPHOCYTES # BLD AUTO: 0.9 K/UL — LOW (ref 1.2–3.4)
LYMPHOCYTES # BLD AUTO: 5.3 % — LOW (ref 20.5–51.1)
MCHC RBC-ENTMCNC: 27.6 PG — SIGNIFICANT CHANGE UP (ref 27–31)
MCHC RBC-ENTMCNC: 31.8 G/DL — LOW (ref 32–37)
MCV RBC AUTO: 86.7 FL — SIGNIFICANT CHANGE UP (ref 81–99)
MONOCYTES # BLD AUTO: 2.23 K/UL — HIGH (ref 0.1–0.6)
MONOCYTES NFR BLD AUTO: 13 % — HIGH (ref 1.7–9.3)
NEUTROPHILS # BLD AUTO: 13.75 K/UL — HIGH (ref 1.4–6.5)
NEUTROPHILS NFR BLD AUTO: 80.3 % — HIGH (ref 42.2–75.2)
NITRITE UR-MCNC: NEGATIVE — SIGNIFICANT CHANGE UP
NRBC # BLD: 0 /100 WBCS — SIGNIFICANT CHANGE UP (ref 0–0)
PCO2 BLDV: 41 MMHG — SIGNIFICANT CHANGE UP (ref 39–42)
PH BLDV: 7.48 — HIGH (ref 7.32–7.43)
PH UR: 5.5 — SIGNIFICANT CHANGE UP (ref 5–8)
PLATELET # BLD AUTO: 300 K/UL — SIGNIFICANT CHANGE UP (ref 130–400)
PMV BLD: 9.6 FL — SIGNIFICANT CHANGE UP (ref 7.4–10.4)
PO2 BLDV: 22 MMHG — LOW (ref 25–45)
POTASSIUM BLDV-SCNC: 2.6 MMOL/L — CRITICAL LOW (ref 3.5–5.1)
POTASSIUM SERPL-MCNC: 2.6 MMOL/L — CRITICAL LOW (ref 3.5–5)
POTASSIUM SERPL-SCNC: 2.6 MMOL/L — CRITICAL LOW (ref 3.5–5)
PROT SERPL-MCNC: 6.6 G/DL — SIGNIFICANT CHANGE UP (ref 6–8)
PROT UR-MCNC: SIGNIFICANT CHANGE UP MG/DL
PROTHROM AB SERPL-ACNC: 18.3 SEC — HIGH (ref 9.95–12.87)
RBC # BLD: 3.23 M/UL — LOW (ref 4.2–5.4)
RBC # FLD: 14.7 % — HIGH (ref 11.5–14.5)
SAO2 % BLDV: 27.2 % — LOW (ref 67–88)
SODIUM SERPL-SCNC: 138 MMOL/L — SIGNIFICANT CHANGE UP (ref 135–146)
SP GR SPEC: 1.02 — SIGNIFICANT CHANGE UP (ref 1–1.03)
TROPONIN T, HIGH SENSITIVITY RESULT: 120 NG/L — CRITICAL HIGH (ref 6–13)
TROPONIN T, HIGH SENSITIVITY RESULT: 135 NG/L — CRITICAL HIGH (ref 6–13)
UROBILINOGEN FLD QL: 1 MG/DL — SIGNIFICANT CHANGE UP (ref 0.2–1)
WBC # BLD: 17.12 K/UL — HIGH (ref 4.8–10.8)
WBC # FLD AUTO: 17.12 K/UL — HIGH (ref 4.8–10.8)

## 2024-07-04 PROCEDURE — 76770 US EXAM ABDO BACK WALL COMP: CPT

## 2024-07-04 PROCEDURE — 80202 ASSAY OF VANCOMYCIN: CPT

## 2024-07-04 PROCEDURE — 93306 TTE W/DOPPLER COMPLETE: CPT

## 2024-07-04 PROCEDURE — 93010 ELECTROCARDIOGRAM REPORT: CPT

## 2024-07-04 PROCEDURE — 36415 COLL VENOUS BLD VENIPUNCTURE: CPT

## 2024-07-04 PROCEDURE — 71045 X-RAY EXAM CHEST 1 VIEW: CPT | Mod: 26

## 2024-07-04 PROCEDURE — 99222 1ST HOSP IP/OBS MODERATE 55: CPT

## 2024-07-04 PROCEDURE — 83540 ASSAY OF IRON: CPT

## 2024-07-04 PROCEDURE — 86850 RBC ANTIBODY SCREEN: CPT

## 2024-07-04 PROCEDURE — 83550 IRON BINDING TEST: CPT

## 2024-07-04 PROCEDURE — 93970 EXTREMITY STUDY: CPT

## 2024-07-04 PROCEDURE — 82962 GLUCOSE BLOOD TEST: CPT

## 2024-07-04 PROCEDURE — 71045 X-RAY EXAM CHEST 1 VIEW: CPT

## 2024-07-04 PROCEDURE — 97162 PT EVAL MOD COMPLEX 30 MIN: CPT | Mod: GP

## 2024-07-04 PROCEDURE — 97165 OT EVAL LOW COMPLEX 30 MIN: CPT | Mod: GO

## 2024-07-04 PROCEDURE — 86901 BLOOD TYPING SEROLOGIC RH(D): CPT

## 2024-07-04 PROCEDURE — 94760 N-INVAS EAR/PLS OXIMETRY 1: CPT

## 2024-07-04 PROCEDURE — 85027 COMPLETE CBC AUTOMATED: CPT

## 2024-07-04 PROCEDURE — 83036 HEMOGLOBIN GLYCOSYLATED A1C: CPT

## 2024-07-04 PROCEDURE — 92526 ORAL FUNCTION THERAPY: CPT | Mod: GN

## 2024-07-04 PROCEDURE — 82728 ASSAY OF FERRITIN: CPT

## 2024-07-04 PROCEDURE — 80053 COMPREHEN METABOLIC PANEL: CPT

## 2024-07-04 PROCEDURE — 87640 STAPH A DNA AMP PROBE: CPT

## 2024-07-04 PROCEDURE — 84484 ASSAY OF TROPONIN QUANT: CPT

## 2024-07-04 PROCEDURE — 84466 ASSAY OF TRANSFERRIN: CPT

## 2024-07-04 PROCEDURE — 84100 ASSAY OF PHOSPHORUS: CPT

## 2024-07-04 PROCEDURE — 92611 MOTION FLUOROSCOPY/SWALLOW: CPT | Mod: GN

## 2024-07-04 PROCEDURE — 94640 AIRWAY INHALATION TREATMENT: CPT

## 2024-07-04 PROCEDURE — 92610 EVALUATE SWALLOWING FUNCTION: CPT | Mod: GN

## 2024-07-04 PROCEDURE — 87641 MR-STAPH DNA AMP PROBE: CPT

## 2024-07-04 PROCEDURE — 83735 ASSAY OF MAGNESIUM: CPT

## 2024-07-04 PROCEDURE — 97116 GAIT TRAINING THERAPY: CPT | Mod: GP

## 2024-07-04 PROCEDURE — 72170 X-RAY EXAM OF PELVIS: CPT | Mod: 26

## 2024-07-04 PROCEDURE — 83605 ASSAY OF LACTIC ACID: CPT

## 2024-07-04 PROCEDURE — 86900 BLOOD TYPING SEROLOGIC ABO: CPT

## 2024-07-04 PROCEDURE — 93971 EXTREMITY STUDY: CPT | Mod: LT

## 2024-07-04 PROCEDURE — 99285 EMERGENCY DEPT VISIT HI MDM: CPT | Mod: FS

## 2024-07-04 PROCEDURE — 85379 FIBRIN DEGRADATION QUANT: CPT

## 2024-07-04 PROCEDURE — 80048 BASIC METABOLIC PNL TOTAL CA: CPT

## 2024-07-04 PROCEDURE — 97110 THERAPEUTIC EXERCISES: CPT | Mod: GP

## 2024-07-04 PROCEDURE — 74230 X-RAY XM SWLNG FUNCJ C+: CPT

## 2024-07-04 PROCEDURE — 85025 COMPLETE CBC W/AUTO DIFF WBC: CPT

## 2024-07-04 RX ORDER — HEPARIN SODIUM 50 [USP'U]/ML
5000 INJECTION, SOLUTION INTRAVENOUS EVERY 8 HOURS
Refills: 0 | Status: DISCONTINUED | OUTPATIENT
Start: 2024-07-04 | End: 2024-07-20

## 2024-07-04 RX ORDER — AZITHROMYCIN 250 MG/1
500 TABLET, FILM COATED ORAL ONCE
Refills: 0 | Status: COMPLETED | OUTPATIENT
Start: 2024-07-04 | End: 2024-07-04

## 2024-07-04 RX ORDER — VANCOMYCIN HYDROCHLORIDE 50 MG/ML
1000 KIT ORAL EVERY 24 HOURS
Refills: 0 | Status: DISCONTINUED | OUTPATIENT
Start: 2024-07-04 | End: 2024-07-08

## 2024-07-04 RX ORDER — DEXTROSE MONOHYDRATE AND SODIUM CHLORIDE 5; .3 G/100ML; G/100ML
1000 INJECTION, SOLUTION INTRAVENOUS
Refills: 0 | Status: DISCONTINUED | OUTPATIENT
Start: 2024-07-04 | End: 2024-07-05

## 2024-07-04 RX ORDER — DEXTROSE MONOHYDRATE AND SODIUM CHLORIDE 5; .3 G/100ML; G/100ML
2000 INJECTION, SOLUTION INTRAVENOUS ONCE
Refills: 0 | Status: COMPLETED | OUTPATIENT
Start: 2024-07-04 | End: 2024-07-04

## 2024-07-04 RX ORDER — INSULIN LISPRO 100 [IU]/ML
INJECTION, SOLUTION SUBCUTANEOUS
Refills: 0 | Status: DISCONTINUED | OUTPATIENT
Start: 2024-07-04 | End: 2024-07-05

## 2024-07-04 RX ORDER — VANCOMYCIN HYDROCHLORIDE 50 MG/ML
1000 KIT ORAL ONCE
Refills: 0 | Status: COMPLETED | OUTPATIENT
Start: 2024-07-04 | End: 2024-07-04

## 2024-07-04 RX ORDER — POTASSIUM CHLORIDE 600 MG/1
40 TABLET, FILM COATED, EXTENDED RELEASE ORAL ONCE
Refills: 0 | Status: COMPLETED | OUTPATIENT
Start: 2024-07-04 | End: 2024-07-04

## 2024-07-04 RX ORDER — VANCOMYCIN HYDROCHLORIDE 50 MG/ML
1000 KIT ORAL EVERY 12 HOURS
Refills: 0 | Status: DISCONTINUED | OUTPATIENT
Start: 2024-07-04 | End: 2024-07-04

## 2024-07-04 RX ORDER — POTASSIUM CHLORIDE 600 MG/1
20 TABLET, FILM COATED, EXTENDED RELEASE ORAL ONCE
Refills: 0 | Status: DISCONTINUED | OUTPATIENT
Start: 2024-07-04 | End: 2024-07-04

## 2024-07-04 RX ORDER — ACETAMINOPHEN 325 MG
650 TABLET ORAL EVERY 6 HOURS
Refills: 0 | Status: DISCONTINUED | OUTPATIENT
Start: 2024-07-04 | End: 2024-07-20

## 2024-07-04 RX ORDER — DEXTROSE 30 % IN WATER 30 %
12.5 VIAL (ML) INTRAVENOUS ONCE
Refills: 0 | Status: DISCONTINUED | OUTPATIENT
Start: 2024-07-04 | End: 2024-07-20

## 2024-07-04 RX ORDER — POTASSIUM CHLORIDE 600 MG/1
40 TABLET, FILM COATED, EXTENDED RELEASE ORAL ONCE
Refills: 0 | Status: DISCONTINUED | OUTPATIENT
Start: 2024-07-04 | End: 2024-07-04

## 2024-07-04 RX ORDER — LEVOTHYROXINE SODIUM 25 MCG
25 TABLET ORAL
Refills: 0 | Status: DISCONTINUED | OUTPATIENT
Start: 2024-07-04 | End: 2024-07-20

## 2024-07-04 RX ORDER — ACETAMINOPHEN 325 MG
650 TABLET ORAL EVERY 6 HOURS
Refills: 0 | Status: DISCONTINUED | OUTPATIENT
Start: 2024-07-04 | End: 2024-07-04

## 2024-07-04 RX ORDER — LEVOTHYROXINE SODIUM 25 MCG
25 TABLET ORAL AT BEDTIME
Refills: 0 | Status: DISCONTINUED | OUTPATIENT
Start: 2024-07-04 | End: 2024-07-04

## 2024-07-04 RX ORDER — POTASSIUM CHLORIDE 600 MG/1
20 TABLET, FILM COATED, EXTENDED RELEASE ORAL ONCE
Refills: 0 | Status: COMPLETED | OUTPATIENT
Start: 2024-07-04 | End: 2024-07-04

## 2024-07-04 RX ORDER — CEFEPIME 1 G/1
1000 INJECTION, POWDER, FOR SOLUTION INTRAMUSCULAR; INTRAVENOUS EVERY 12 HOURS
Refills: 0 | Status: DISCONTINUED | OUTPATIENT
Start: 2024-07-04 | End: 2024-07-05

## 2024-07-04 RX ORDER — ONDANSETRON HYDROCHLORIDE 2 MG/ML
4 INJECTION INTRAMUSCULAR; INTRAVENOUS EVERY 8 HOURS
Refills: 0 | Status: DISCONTINUED | OUTPATIENT
Start: 2024-07-04 | End: 2024-07-20

## 2024-07-04 RX ORDER — CEFEPIME 1 G/1
2000 INJECTION, POWDER, FOR SOLUTION INTRAMUSCULAR; INTRAVENOUS ONCE
Refills: 0 | Status: COMPLETED | OUTPATIENT
Start: 2024-07-04 | End: 2024-07-04

## 2024-07-04 RX ADMIN — VANCOMYCIN HYDROCHLORIDE 250 MILLIGRAM(S): KIT at 16:34

## 2024-07-04 RX ADMIN — HEPARIN SODIUM 5000 UNIT(S): 50 INJECTION, SOLUTION INTRAVENOUS at 22:20

## 2024-07-04 RX ADMIN — POTASSIUM CHLORIDE 50 MILLIEQUIVALENT(S): 600 TABLET, FILM COATED, EXTENDED RELEASE ORAL at 18:55

## 2024-07-04 RX ADMIN — AZITHROMYCIN 255 MILLIGRAM(S): 250 TABLET, FILM COATED ORAL at 20:54

## 2024-07-04 RX ADMIN — POTASSIUM CHLORIDE 40 MILLIEQUIVALENT(S): 600 TABLET, FILM COATED, EXTENDED RELEASE ORAL at 17:56

## 2024-07-04 RX ADMIN — CEFEPIME 100 MILLIGRAM(S): 1 INJECTION, POWDER, FOR SOLUTION INTRAMUSCULAR; INTRAVENOUS at 18:42

## 2024-07-04 RX ADMIN — DEXTROSE MONOHYDRATE AND SODIUM CHLORIDE 2000 MILLILITER(S): 5; .3 INJECTION, SOLUTION INTRAVENOUS at 16:34

## 2024-07-04 NOTE — PATIENT PROFILE ADULT - FUNCTIONAL ASSESSMENT - BASIC MOBILITY 6.
3-calculated by average/Not able to assess (calculate score using [facility] averaging method)

## 2024-07-04 NOTE — ED ADULT NURSE NOTE - NSFALLUNIVINTERV_ED_ALL_ED
[address], [address]  [place], [address] [place] [address] Street  Phone: [phone]  Fax: [phone]      Patient: Aris Nails                                                                              MRN: [id number]        YOB: 1961          AGE: 61 y.o. PCP: Home Eduardo MD  Date:  07/26/22    Reason for Consultation: Back Pain and Hip Pain      HPI:  Aris Nails is a 61 y.o. female with relevant PMH of scoliosis, HTN, who presented with chronic low back pain with intermittent radiation down right and left legs which has been manageable in the past.  Around memorial day 2022 ago her symptoms worsened after sitting on a hard seat for over 5 hrs. She developed pain radiating down the right leg which has not resolved. She has tried a prednisone pack (felt great the first day but pain returned)  and tylenol. Since her last visit she has completed PT. Pain has improved but she continues to have weakness in her right foot. Neurologic symptoms: No numbness, tingling, weakness, bowel or bladder changes. No recent falls      Location: The pain is located in the low back and b/l hips   Radiation: The pain does radiate right anterior lateral thigh, front of leg into the foot. Pain Score: Currently: 1/10  , at worst 3/10   Quality: Pain is of a Achy, Burning, Cramping and Stabbing quality. Aggravating: Pain is exacerbated by sitting and driving, yard work, cleaning  Alleviating:  The pain is alleviated by walking, standing, lying down and exercise    Prior Treatments:  Physical therapy: Currently in PT x 3 weeks  Injections:NO    Previous Medications: prednisone-taper, tylenol, diclofenac, gabapentin 300mg   Current Medications:   Previous work-up has included:   6/28/2022- Lumbar spine x-ray 2 views AP and Lat- thoracolumbar scoliosis, lower lumbar DDD     Past Medical History:   Past Medical History:   Diagnosis Date    High cholesterol     Hypertension     Prediabetes       Past Surgical History:   Past Surgical History:   Procedure Laterality Date    HC TOTAL HYSTERECTOMY        SocHx:   Social History     Tobacco Use    Smoking status: Never    Smokeless tobacco: Never   Substance Use Topics    Alcohol use: Yes     Comment: occasionally      FamHx:? Family History   Problem Relation Age of Onset    Stroke Mother     Diabetes Father     Hypertension Father     Heart Disease Father        Current Medications:    Current Outpatient Medications   Medication Sig Dispense Refill    beclomethasone dipropionate (QVAR REDIHALER HFA) 40 mcg/actuation HFAb inhaler Take 2 Puffs by inhalation two (2) times a day. albuterol (PROVENTIL HFA, VENTOLIN HFA, PROAIR HFA) 90 mcg/actuation inhaler Take 2 Puffs by inhalation. calcium carbonate (TUMS) 200 mg calcium (500 mg) chew Take 500 mg by mouth. fluticasone propionate (FLONASE) 50 mcg/actuation nasal spray       krill-om-3-dha-epa-phospho-ast 768-169-50-64 mg cap Take  by mouth.      lisinopril-hydroCHLOROthiazide (PRINZIDE, ZESTORETIC) 20-25 mg per tablet       montelukast (SINGULAIR) 10 mg tablet       therapeutic multivitamin-minerals (THERAGRAN-M) tablet Take 1 Tablet by mouth daily. simvastatin (ZOCOR) 10 mg tablet       gabapentin (NEURONTIN) 300 mg capsule Take 1 Capsule by mouth nightly for 30 days. Max Daily Amount: 300 mg. (Patient not taking: Reported on 7/26/2022) 30 Capsule 0      Allergies:     Allergies   Allergen Reactions    Latex, Natural Rubber Hives, Itching, Rash and Swelling     gloves      Erythromycin Hives    Sulfa (Sulfonamide Antibiotics) Hives        Review of Systems:   Gen:    Denied fevers, chills, malaise, fatigue, weight changes   Resp: Denied shortness of breath, cough, wheezing   CVS: Denied chest pain, palpitations   : Denied urinary urgency, frequency, incontinence   GI: Denied nausea, vomiting, constipation, diarrhea Skin: Denied rashes, wounds   Psych: Denied anxiety, depression   Vasc: Denied claudication, ulcers   Hem: Denied easy bruising/bleeding   MSK: See HPI   Neuro: See HPI         Physical Exam     Vital Signs:   Visit Vitals  /69 (BP 1 Location: Left upper arm, BP Patient Position: Sitting, BP Cuff Size: Adult)   Pulse 68   Temp 97.4 °F (36.3 °C) (Temporal)   Resp 18   Ht 5' 3\" (1.6 m)   Wt 162 lb (73.5 kg)   SpO2 98% Comment: RA   BMI 28.70 kg/m²      General: ??????? Well nourished and well developed female without any acute distress   Psychiatric: ?  Alert and oriented x 3 with normal mood    HEENT: ???????? Atraumatic   Respiratory:   Breathing non-labored and non dyspneic   CV: ???????????????? Peripheral pulses intact, no peripheral edema   Skin: ????????????? No rashes       Neurologic: ?? Sensation: normal and grossly intact thebilateral, lower extremity(s)  Strength: 5/5 in the bilateral, lower extremity(s) except weakness right ankle DGF 4/5   Reflexes: reveals 2+ symmetric DTRs throughout   Gait: normal     Musculoskeletal: Lumbar Exam     Inspection:   Alignment: Abnormal scoliosis  Atrophy: None     Tenderness to Palpation:   Lumbar paraspinals Positive +b/l  Lumbar spinous processes Negative  SI Joint:  Positive ++  Gluteal:Positive++   Greater trochanter: Positive b/l      ROM:   Lumbar ROM: Abnormal pain with extension  Lumbar facet loading: Negative  Hip ROM: No reproduction of pain with movement     Special Tests      Slump test: Positive right   SLR: Negative  SELENE: Negative  FADIR: Negative  Log Roll: Negative      Medical Decision Making:    Images: The imaging results as well as the actual images of the studies below were reviewed, visualized and interpreted by me. Labs: The results below were reviewed.    Lumbar spine x-ray 2 views AP and Lat- thoracolumbar scoliosis, lower lumbar DDD     Assessment:   -scoliosis  -right lumbar radiculopathy   -b/l GT bursitis     Plan: -Physical therapy -  Continue HEP. Exercises provided to help strengthening b/l hips gluteus medius   -Medications - NA  -Diagnostics/Imaging - MRI lumbar spine given weakness right ankle DF  -Injections - Consider DA   -Lifestyle -Discussed activities to avoid and those to continue   -Education - The patient's diagnosis, prognosis and treatment options were discussed today. All questions were answered.     F/U - after MRI         [address] [facility] Bed/Stretcher in lowest position, wheels locked, appropriate side rails in place/Call bell, personal items and telephone in reach/Instruct patient to call for assistance before getting out of bed/chair/stretcher/Non-slip footwear applied when patient is off stretcher/[place] to call system/Physically safe environment - no spills, clutter or unnecessary equipment/Purposeful proactive rounding/Room/bathroom lighting operational, light cord in reach yes

## 2024-07-04 NOTE — ED PROVIDER NOTE - CARE PLAN
Principal Discharge DX:	Pneumonia  Secondary Diagnosis:	Sepsis  Secondary Diagnosis:	Hypokalemia  Secondary Diagnosis:	Abnormal EKG   1

## 2024-07-04 NOTE — ED ADULT NURSE NOTE - NSICDXPASTMEDICALHX_GEN_ALL_CORE_FT
PAST MEDICAL HISTORY:  Anemia     Aortic stenosis     Flaco's disease     HTN (hypertension)     Hypothyroid

## 2024-07-04 NOTE — CONSULT NOTE ADULT - SUBJECTIVE AND OBJECTIVE BOX
Patient is a 89y old  Female who presents with a chief complaint of       REVIEW OF SYSTEMS  General:	feels weak  Skin/Breast: no rash 	  Ophthalmologic: no blurry vision 	  ENMT:	no thrus  Respiratory and Thorax: + cough 	  Cardiovascular:	no chest pain   Gastrointestinal:	no diarrhea   Genitourinary:	no dysuria   Musculoskeletal:	 feels weak   Neurological:	no dizziness  Psychiatric:	no hallucinations      PHYSICAL EXAM:  GENERAL: NAD, well-groomed, well-developed  HEAD:  Atraumatic, Normocephalic  EYES: EOMI, PERRLA, conjunctiva and sclera clear  ENMT: No tonsillar erythema, exudates, or enlargement; Moist mucous membranes, Good dentition, No lesions  NECK: Supple, No JVD, Normal thyroid  NERVOUS SYSTEM:  Alert & Oriented X3, Good concentration; Motor Strength 5/5 B/L upper and lower extremities; DTRs 2+ intact and symmetric  CHEST/LUNG: Clear to percussion bilaterally; No rales, rhonchi, wheezing, or rubs  HEART: Regular rate and rhythm; No murmurs, rubs, or gallops  ABDOMEN: Soft, Nontender, Nondistended; Bowel sounds present  EXTREMITIES:  2+ Peripheral Pulses, No clubbing, cyanosis, or edema    labs  07-04    138  |  96<L>  |  37<H>  ----------------------------<  141<H>  2.6<LL>   |  26  |  1.4    Ca    9.6      04 Jul 2024 16:20    TPro  6.6  /  Alb  3.4<L>  /  TBili  1.2  /  DBili  x   /  AST  23  /  ALT  12  /  AlkPhos  112  07-04                          8.9    17.12 )-----------( 300      ( 04 Jul 2024 16:20 )             28.0         Urinalysis with Rflx Culture (collected 04 Jul 2024 17:55)      PT/INR - ( 04 Jul 2024 16:20 )   PT: 18.30 sec;   INR: 1.60 ratio         PTT - ( 04 Jul 2024 16:20 )  PTT:29.9 sec

## 2024-07-04 NOTE — PATIENT PROFILE ADULT - HAVE YOU BEEN EATING POORLY BECAUSE OF A DECREASED APPETITE?
Anticoagulation Clinic Progress Note    Anticoagulation Summary  As of 2022    INR goal:  2.0-3.0   TTR:  59.3 % (2 y)   INR used for dosin.20 (2022)   Warfarin maintenance plan:  7.5 mg every day; Starting 2022   Weekly warfarin total:  52.5 mg   No change documented:  Zina De La Rosa RPH   Plan last modified:  Zina De La Rosa RPH (2022)   Next INR check:  2023   Priority:  High   Target end date:  Indefinite    Indications    Other acute pulmonary embolism  unspecified whether acute cor pulmonale present (HCC) (Resolved) [I26.99]  Acute pulmonary embolism  unspecified pulmonary embolism type  unspecified whether acute cor pulmonale present (HCC) (Resolved) [I26.99]             Anticoagulation Episode Summary     INR check location:      Preferred lab:      Send INR reminders to:  [facility]    Comments:  **Home Testing Program**      Anticoagulation Care Providers     Provider Role Specialty Phone number    Lisset Melton MD Referring Cardiology [phone]          Clinic Interview:  No pertinent clinical findings have been reported.    INR History:  Anticoagulation Monitoring 2022   INR 2.50 2.90 2.20   INR Date 2022   INR Goal 2.0-3.0 2.0-3.0 2.0-3.0   Trend Same Same Same   Last Week Total 52.5 mg 52.5 mg 52.5 mg   Next Week Total 52.5 mg 52.5 mg 52.5 mg   Sun 7.5 mg 7.5 mg 7.5 mg   Mon - 7.5 mg 7.5 mg   Tue 7.5 mg 7.5 mg 7.5 mg   Wed 7.5 mg 7.5 mg 7.5 mg   Thu 7.5 mg 7.5 mg 7.5 mg   Fri 7.5 mg 7.5 mg 7.5 mg   Sat 7.5 mg 7.5 mg 7.5 mg   Visit Report - - -   Some recent data might be hidden       Plan:  1. INR is therapeutic today- see above in Anticoagulation Summary.    Nina Saez to continue their warfarin regimen- see above in Anticoagulation Summary.  2. Follow up in 1 week  3. Pt has agreed to only be called if INR out of range. They have been instructed to call if any changes in  medications, doses, concerns, etc. Patient expresses understanding and has no further questions at this time.    Zina De La Rosa, [facility]   No (0)

## 2024-07-04 NOTE — ED PROVIDER NOTE - ATTENDING APP SHARED VISIT CONTRIBUTION OF CARE
89-year-old female, past medical history of aortic stenosis, brought in by daughter for weakness and difficulty ambulating for the last 3-4 days.  Up until 4 days ago patient has been ambulating with a walker.  Patient also reports decreased appetite.  No fever/chills at home, chest pain/shortness of breath, abdominal pain, nausea/vomiting/diarrhea, urinary symptoms.  Patient states she just has no energy.  On exam, pt in NAD, AAOx3, head NC/AT, CN II-XII intact, lungs CTA B/L, CV S1S2 regular, abdomen soft/NT/ND/(+)BS, ext (-) edema, motor 5/5x4, sensation intact.  Will do labs, x-ray, UA, and reevaluate.  Patient found to be febrile in the ED.  Antibiotic started. Will admit.

## 2024-07-04 NOTE — H&P ADULT - NSHPPHYSICALEXAM_GEN_ALL_CORE
T(C): 38.1 (07-04-24 @ 16:05), Max: 38.1 (07-04-24 @ 16:05)  HR: 79 (07-04-24 @ 15:50) (79 - 79)  BP: 100/54 (07-04-24 @ 15:50) (100/54 - 100/54)  RR: 18 (07-04-24 @ 15:50) (18 - 18)  SpO2: 95% (07-04-24 @ 15:50) (95% - 95%)    PHYSICAL EXAM:  GENERAL: Elderly female, NAD  HEAD:  Atraumatic, Normocephalic  EYES: EOMI, PERRLA, conjunctiva and sclera clear  ENMT: Dry mucous membranes  CHEST/LUNG: Bilateral breath sounds, decreased RLL; No rales, rhonchi or wheezing  HEART: S1,S2 Regular rate and rhythm; No murmurs, rubs, or gallops  ABDOMEN: Soft, nontender, nondistended, +BS  EXTREMITIES: No calf tenderness bilaterally or peripheral edema   NEUROLOGY: non-focal, BUNCH x4, alert &oriented x3

## 2024-07-04 NOTE — ED PROVIDER NOTE - IV ALTEPLASE EXCL ABS HIDDEN
HPI  Pt presents with post right leg pain just above and at level of knee. Pain is worse with bending and stretching of left leg. Has been in PT for low back pain with some stretching of right leg but no improvement noted.   Is taking muscle relaxants at Cyclobenzaprine HCl 10 MG Oral Tab Take 1 tablet (10 mg total) by mouth nightly. Disp: 30 tablet Rfl: 0   TiZANidine HCl 4 MG Oral Tab Take 4 mg by mouth 2 (two) times daily.  Disp:  Rfl: 0   Albuterol Sulfate  (90 Base) MCG/ACT Inhalation Aero Soln Psychiatric: He has a normal mood and affect. His behavior is normal. Judgment and thought content normal.   Nursing note and vitals reviewed. Assessment:     1. Strain of right quadriceps, initial encounter        Plan:    1.  Ice to affected area 20 show

## 2024-07-04 NOTE — H&P ADULT - HISTORY OF PRESENT ILLNESS
This is a 89-year-old female, past medical history of aortic stenosis, HTN, HLD, and anxiety brought in by daughter for weakness and difficulty ambulating for the last 4 days.  Up until 4 days ago, patient has been ambulating with a walker.  Patient's daughter also reports patient has decreased appetite and difficulty swallowing,  Patient states she just has no energy. Patient denies fever/chills, chest pain/shortness of breath, abdominal pain, nausea/vomiting/diarrhea, urinary symptoms.   This is a 89-year-old female, past medical history of aortic stenosis, HTN, HLD, and anxiety brought in by daughter for weakness and difficulty ambulating for the last 4 days.  Up until 4 days ago, patient has been ambulating with a walker.  Patient's daughter also reports patient has decreased appetite and difficulty swallowing,  Patient states she just has no energy. Patient's daughter reports patient fell once while ambulating, but denies any head trauma or LOC. Patient denies fever/chills, chest pain/shortness of breath, abdominal pain, nausea/vomiting/diarrhea, urinary symptoms.

## 2024-07-04 NOTE — H&P ADULT - ASSESSMENT
This is a 89-year-old female, past medical history of aortic stenosis, HTN, HLD, and anxiety brought in by daughter for weakness and difficulty ambulating for the last 4 days.  Up until 4 days ago, patient has been ambulating with a walker.  Patient's daughter also reports patient has decreased appetite and difficulty swallowing,  Patient states she just has no energy. Patient denies fever/chills, chest pain/shortness of breath, abdominal pain, nausea/vomiting/diarrhea, urinary symptoms.      #Sepsis POA 2/2 pneumonia   -Admit to inpatient level of care under telemetry  -CXR: (+)RLL opacity  -WBC 17.12, Lactate 3.1,   -s/p Vancomycin, Azithromycin, and Cefepime in ED   -C/w cefepime and vancomycin  -C/w IVF    -ID consult   -Pulm consult   -F/U procalcitonin, Urine Strep/Legionella, MRSA PCR, sputum culture   -Monitor for fevers and leukocytosis  -Pain control and antipyretics prn   -Supplemental O2 prn     #Elevated troponin  -Patient denies CP.   -EKG: Sinus rhythm with PAC, LVH,    -C/w Telemetry monitoring   -F/U cardiology consult    -Continue to trend     #Hypokalemia  -K 2.6, repleted with 40 KClor and 20 KCl IV x1   -F/U repeat labs     #Aortic stenosis   -F/U echo  -Echo 2018: normal LV function, G1DD, mild MR/AR, severe AS     #Hypothyroidism  -c/w IV synthroid     #HTN/HLD  -holding statin and HCTZ - resume once cleared by speech and swallow     #Anemia (Baseline Hgb 9-11)  -Continue to monitor     #Diet: NPO, pending speech and swallow   #DVT ppx: HSQ   #Activity: OOB with assistance     Above plan discussed with       This is a 89-year-old female, past medical history of aortic stenosis, HTN, HLD, and anxiety brought in by daughter for weakness and difficulty ambulating for the last 4 days.  Up until 4 days ago, patient has been ambulating with a walker.  Patient's daughter also reports patient has decreased appetite and difficulty swallowing,  Patient states she just has no energy. Patient denies fever/chills, chest pain/shortness of breath, abdominal pain, nausea/vomiting/diarrhea, urinary symptoms.      #Sepsis POA 2/2 pneumonia   -Admit to inpatient level of care under telemetry  -CXR: (+)RLL opacity  -WBC 17.12, Lactate 3.1, Tmax 100.6 rectal   -s/p Vancomycin, Azithromycin, and Cefepime in ED   -C/w cefepime and vancomycin  -C/w IVF    -ID consult   -Pulm consult   -F/U procalcitonin, Urine Strep/Legionella, MRSA PCR, sputum culture, blood cultures    -F/U repeat lactate   -Monitor for fevers and leukocytosis  -Pain control and antipyretics prn   -Supplemental O2 prn   -Monitor I&O    #Elevated troponin  -Patient denies CP.   -EKG: Sinus rhythm with PAC, LVH,    -C/w Telemetry monitoring   -F/U cardiology consult    -Continue to trend     #Hypokalemia  -K 2.6, repleted with 40 KClor and 20 KCl IV x1   -F/U repeat labs and replete prn     #Aortic stenosis   -F/U echo  -Echo 2018: normal LV function, G1DD, mild MR/AR, severe AS     #Hypothyroidism  -c/w IV synthroid     #HTN/HLD  -holding statin and HCTZ - resume once cleared by speech and swallow     #Anemia (Baseline Hgb 9-11)  -Continue to monitor     #Diet: NPO, pending speech and swallow   #DVT ppx: HSQ   #Activity: OOB with assistance     Above plan discussed with       This is a 89-year-old female, past medical history of aortic stenosis, HTN, HLD, and anxiety brought in by daughter for weakness and difficulty ambulating for the last 4 days.  Up until 4 days ago, patient has been ambulating with a walker.  Patient's daughter also reports patient has decreased appetite and difficulty swallowing,  Patient states she just has no energy. Patient denies fever/chills, chest pain/shortness of breath, abdominal pain, nausea/vomiting/diarrhea, urinary symptoms.      #Sepsis POA 2/2 pneumonia   -Admit to inpatient level of care under telemetry  -CXR: (+)RLL opacity  -WBC 17.12, Lactate 3.1, Tmax 100.6 rectal   -s/p Vancomycin, Azithromycin, and Cefepime in ED   -C/w cefepime and vancomycin  -C/w IVF    -ID consult   -F/U procalcitonin, Urine Strep/Legionella, MRSA PCR, sputum culture, blood cultures    -F/U repeat lactate   -Monitor for fevers and leukocytosis  -Pain control and antipyretics prn   -Supplemental O2 prn   -Monitor I&O    #Elevated troponin  -Patient denies CP.   -EKG: Sinus rhythm with PAC, LVH,    -C/w Telemetry monitoring   -F/U cardiology consult    -Continue to trend     #Hypokalemia  -K 2.6, repleted with 40 KClor and 20 KCl IV x1   -F/U repeat labs and replete prn     #Aortic stenosis   -F/U echo  -Echo 2018: normal LV function, G1DD, mild MR/AR, severe AS     #Hypothyroidism  -c/w IV synthroid     #HTN/HLD  -holding statin and HCTZ - resume once cleared by speech and swallow     #Anemia (Baseline Hgb 9-11)  -Continue to monitor     #Diet: NPO, pending speech and swallow   #DVT ppx: HSQ   #Activity: OOB with assistance     Above plan discussed with       This is a 89-year-old female, past medical history of aortic stenosis, HTN, HLD, and anxiety brought in by daughter for weakness and difficulty ambulating for the last 4 days.  Up until 4 days ago, patient has been ambulating with a walker.  Patient's daughter also reports patient has decreased appetite and difficulty swallowing,  Patient states she just has no energy. Patient's daughter reports patient fell once while ambulating, but denies any head trauma or LOC. Patient denies fever/chills, chest pain/shortness of breath, abdominal pain, nausea/vomiting/diarrhea, urinary symptoms.      #Sepsis POA 2/2 pneumonia   -Admit to inpatient level of care under telemetry  -CXR: (+)RLL opacity  -WBC 17.12, Lactate 3.1, Tmax 100.6 rectal   -s/p Vancomycin, Azithromycin, and Cefepime in ED   -C/w cefepime and vancomycin  -C/w IVF    -ID consult   -F/U procalcitonin, Urine Strep/Legionella, MRSA PCR, sputum culture, blood cultures    -F/U repeat lactate   -Monitor for fevers and leukocytosis  -Pain control and antipyretics prn   -Supplemental O2 prn   -Monitor I&O    #Elevated troponin  -Patient denies CP.   -EKG: Sinus rhythm with PAC, LVH,    -C/w Telemetry monitoring   -F/U cardiology consult    -Continue to trend     #Hypokalemia  -K 2.6, repleted with 40 KClor and 20 KCl IV x1   -F/U repeat labs and replete prn     #Aortic stenosis   -F/U echo  -Echo 2018: normal LV function, G1DD, mild MR/AR, severe AS     #Hypothyroidism  -c/w IV synthroid 25 mcg- resume PO synthroid 50 mcg once cleared by speech and swallow     #HTN/HLD  -holding statin and HCTZ - resume once cleared by speech and swallow     #Anemia (Baseline Hgb 9-11)  -Continue to monitor     #Diet: NPO, pending speech and swallow   #DVT ppx: HSQ   #Activity: OOB with assistance     Above plan discussed with

## 2024-07-04 NOTE — ED PROVIDER NOTE - OBJECTIVE STATEMENT
89 yo female, pmh of anemia, as, htn, hypothyroidism, p/w daughter for generalized weakness x 3 days, pt febrile in triage. Denies chills, cp, sob, neck pain, visual changes, nvd, dizziness, numbness, tingling.

## 2024-07-04 NOTE — H&P ADULT - NSHPLABSRESULTS_GEN_ALL_CORE
8.9    17.12 )-----------( 300      ( 2024 16:20 )             28.0       07-04    138  |  96<L>  |  37<H>  ----------------------------<  141<H>  2.6<LL>   |  26  |  1.4    Ca    9.6      2024 16:20    TPro  6.6  /  Alb  3.4<L>  /  TBili  1.2  /  DBili  x   /  AST  23  /  ALT  12  /  AlkPhos  112  -            Urinalysis Basic - ( 2024 17:55 )  Color: Dark Yellow / Appearance: Clear / S.024 / pH: x  Gluc: x / Ketone: Negative mg/dL  / Bili: Small / Urobili: 1.0 mg/dL   Blood: x / Protein: Trace mg/dL / Nitrite: Negative   Leuk Esterase: Negative / RBC: x / WBC x   Sq Epi: x / Non Sq Epi: x / Bacteria: x        PT/INR - ( 2024 16:20 )   PT: 18.30 sec;   INR: 1.60 ratio    PTT - ( 2024 16:20 )  PTT:29.9 sec    Lactate Trend   @ 16:20 Lactate:2.0       CAPILLARY BLOOD GLUCOSE  POCT Blood Glucose.: 155 mg/dL (2024 15:58)    CXR- pending official radiology read

## 2024-07-04 NOTE — CONSULT NOTE ADULT - ASSESSMENT
A/P:     1. PNA   - chest xray reviewed + PNA R>L with effusion   - c/w antibiotics   - fu all cx  2. AS with troponins  - serial trops  - cardiology eval   - can be admitted to Tele   3. Hypokalemia   - replete electrolyte   4. DVT ppx       Time spend 55 min    A/P:     1. PNA   - chest xray reviewed + PNA R>L with effusion   - c/w antibiotics   - fu all cx  2. AS with troponins  - serial trops  - cardiology eval   - can be admitted to Tele   3. Hypokalemia   - replete electrolyte   4. DVT ppx     can be managed in telemetry     Time spend 55 min

## 2024-07-04 NOTE — PATIENT PROFILE ADULT - FALL HARM RISK - HARM RISK INTERVENTIONS

## 2024-07-04 NOTE — CHART NOTE - NSCHARTNOTEFT_GEN_A_CORE
BP on "soft" side (SBP- 91) but patient fully awake. No definite urine output (newly arrived to floor). On IV fluids. Will await repeat BP and then decide on further intervention if needed

## 2024-07-05 LAB
A1C WITH ESTIMATED AVERAGE GLUCOSE RESULT: 5.3 % — SIGNIFICANT CHANGE UP (ref 4–5.6)
ALBUMIN SERPL ELPH-MCNC: 3.1 G/DL — LOW (ref 3.5–5.2)
ALP SERPL-CCNC: 119 U/L — HIGH (ref 30–115)
ALT FLD-CCNC: 76 U/L — HIGH (ref 0–41)
ANION GAP SERPL CALC-SCNC: 15 MMOL/L — HIGH (ref 7–14)
ANION GAP SERPL CALC-SCNC: 18 MMOL/L — HIGH (ref 7–14)
AST SERPL-CCNC: 154 U/L — HIGH (ref 0–41)
BASOPHILS # BLD AUTO: 0 K/UL — SIGNIFICANT CHANGE UP (ref 0–0.2)
BASOPHILS NFR BLD AUTO: 0 % — SIGNIFICANT CHANGE UP (ref 0–1)
BILIRUB SERPL-MCNC: 1.3 MG/DL — HIGH (ref 0.2–1.2)
BUN SERPL-MCNC: 42 MG/DL — HIGH (ref 10–20)
BUN SERPL-MCNC: 44 MG/DL — HIGH (ref 10–20)
CALCIUM SERPL-MCNC: 9.1 MG/DL — SIGNIFICANT CHANGE UP (ref 8.4–10.5)
CALCIUM SERPL-MCNC: 9.3 MG/DL — SIGNIFICANT CHANGE UP (ref 8.4–10.5)
CHLORIDE SERPL-SCNC: 96 MMOL/L — LOW (ref 98–110)
CHLORIDE SERPL-SCNC: 96 MMOL/L — LOW (ref 98–110)
CO2 SERPL-SCNC: 22 MMOL/L — SIGNIFICANT CHANGE UP (ref 17–32)
CO2 SERPL-SCNC: 23 MMOL/L — SIGNIFICANT CHANGE UP (ref 17–32)
CREAT SERPL-MCNC: 1.7 MG/DL — HIGH (ref 0.7–1.5)
CREAT SERPL-MCNC: 1.8 MG/DL — HIGH (ref 0.7–1.5)
EGFR: 26 ML/MIN/1.73M2 — LOW
EGFR: 28 ML/MIN/1.73M2 — LOW
EOSINOPHIL NFR BLD AUTO: 0 % — SIGNIFICANT CHANGE UP (ref 0–8)
ESTIMATED AVERAGE GLUCOSE: 105 MG/DL — SIGNIFICANT CHANGE UP (ref 68–114)
GLUCOSE BLDC GLUCOMTR-MCNC: 147 MG/DL — HIGH (ref 70–99)
GLUCOSE SERPL-MCNC: 124 MG/DL — HIGH (ref 70–99)
GLUCOSE SERPL-MCNC: 136 MG/DL — HIGH (ref 70–99)
HCT VFR BLD CALC: 25.2 % — LOW (ref 37–47)
HGB BLD-MCNC: 8.4 G/DL — LOW (ref 12–16)
LACTATE SERPL-SCNC: 1.9 MMOL/L — SIGNIFICANT CHANGE UP (ref 0.7–2)
LYMPHOCYTES # BLD AUTO: 0.47 K/UL — LOW (ref 1.2–3.4)
LYMPHOCYTES # BLD AUTO: 2 % — LOW (ref 20.5–51.1)
MAGNESIUM SERPL-MCNC: 1.2 MG/DL — LOW (ref 1.8–2.4)
MCHC RBC-ENTMCNC: 28.5 PG — SIGNIFICANT CHANGE UP (ref 27–31)
MCHC RBC-ENTMCNC: 33.3 G/DL — SIGNIFICANT CHANGE UP (ref 32–37)
MCV RBC AUTO: 85.4 FL — SIGNIFICANT CHANGE UP (ref 81–99)
MONOCYTES # BLD AUTO: 1.65 K/UL — HIGH (ref 0.1–0.6)
MONOCYTES NFR BLD AUTO: 7 % — SIGNIFICANT CHANGE UP (ref 1.7–9.3)
MRSA PCR RESULT.: NEGATIVE — SIGNIFICANT CHANGE UP
NEUTROPHILS # BLD AUTO: 21.49 K/UL — HIGH (ref 1.4–6.5)
NEUTROPHILS NFR BLD AUTO: 88 % — HIGH (ref 42.2–75.2)
NRBC # BLD: SIGNIFICANT CHANGE UP /100 WBCS (ref 0–0)
PHOSPHATE SERPL-MCNC: 3.7 MG/DL — SIGNIFICANT CHANGE UP (ref 2.1–4.9)
PLATELET # BLD AUTO: 306 K/UL — SIGNIFICANT CHANGE UP (ref 130–400)
PMV BLD: 9.9 FL — SIGNIFICANT CHANGE UP (ref 7.4–10.4)
POTASSIUM SERPL-MCNC: 3.4 MMOL/L — LOW (ref 3.5–5)
POTASSIUM SERPL-MCNC: 3.5 MMOL/L — SIGNIFICANT CHANGE UP (ref 3.5–5)
POTASSIUM SERPL-SCNC: 3.4 MMOL/L — LOW (ref 3.5–5)
POTASSIUM SERPL-SCNC: 3.5 MMOL/L — SIGNIFICANT CHANGE UP (ref 3.5–5)
PROT SERPL-MCNC: 5.9 G/DL — LOW (ref 6–8)
RBC # BLD: 2.95 M/UL — LOW (ref 4.2–5.4)
RBC # FLD: 15 % — HIGH (ref 11.5–14.5)
SODIUM SERPL-SCNC: 134 MMOL/L — LOW (ref 135–146)
SODIUM SERPL-SCNC: 136 MMOL/L — SIGNIFICANT CHANGE UP (ref 135–146)
WBC # BLD: 23.61 K/UL — HIGH (ref 4.8–10.8)
WBC # FLD AUTO: 23.61 K/UL — HIGH (ref 4.8–10.8)

## 2024-07-05 PROCEDURE — 76770 US EXAM ABDO BACK WALL COMP: CPT | Mod: 26

## 2024-07-05 PROCEDURE — 99223 1ST HOSP IP/OBS HIGH 75: CPT

## 2024-07-05 PROCEDURE — 93306 TTE W/DOPPLER COMPLETE: CPT | Mod: 26

## 2024-07-05 RX ORDER — MIDODRINE HYDROCHLORIDE 10 MG/1
5 TABLET ORAL ONCE
Refills: 0 | Status: DISCONTINUED | OUTPATIENT
Start: 2024-07-05 | End: 2024-07-07

## 2024-07-05 RX ORDER — DOXYCYCLINE 100 MG/1
100 CAPSULE ORAL EVERY 12 HOURS
Refills: 0 | Status: DISCONTINUED | OUTPATIENT
Start: 2024-07-05 | End: 2024-07-05

## 2024-07-05 RX ORDER — POTASSIUM CHLORIDE 600 MG/1
20 TABLET, FILM COATED, EXTENDED RELEASE ORAL ONCE
Refills: 0 | Status: COMPLETED | OUTPATIENT
Start: 2024-07-05 | End: 2024-07-05

## 2024-07-05 RX ORDER — MAGNESIUM SULFATE 100 %
2 POWDER (GRAM) MISCELLANEOUS
Refills: 0 | Status: COMPLETED | OUTPATIENT
Start: 2024-07-05 | End: 2024-07-05

## 2024-07-05 RX ORDER — DEXTROSE MONOHYDRATE AND SODIUM CHLORIDE 5; .3 G/100ML; G/100ML
1000 INJECTION, SOLUTION INTRAVENOUS
Refills: 0 | Status: DISCONTINUED | OUTPATIENT
Start: 2024-07-05 | End: 2024-07-05

## 2024-07-05 RX ORDER — NOREPINEPHRINE BITARTRATE 1 MG/ML
0.05 INJECTION INTRAVENOUS
Qty: 8 | Refills: 0 | Status: DISCONTINUED | OUTPATIENT
Start: 2024-07-05 | End: 2024-07-10

## 2024-07-05 RX ORDER — MAGNESIUM SULFATE 100 %
1 POWDER (GRAM) MISCELLANEOUS ONCE
Refills: 0 | Status: COMPLETED | OUTPATIENT
Start: 2024-07-05 | End: 2024-07-05

## 2024-07-05 RX ORDER — DOXYCYCLINE 100 MG/1
100 CAPSULE ORAL EVERY 12 HOURS
Refills: 0 | Status: DISCONTINUED | OUTPATIENT
Start: 2024-07-05 | End: 2024-07-08

## 2024-07-05 RX ORDER — CEFTRIAXONE SODIUM 500 MG
1000 VIAL (EA) INJECTION EVERY 24 HOURS
Refills: 0 | Status: DISCONTINUED | OUTPATIENT
Start: 2024-07-05 | End: 2024-07-08

## 2024-07-05 RX ORDER — SODIUM CHLORIDE 0.9 % (FLUSH) 0.9 %
500 SYRINGE (ML) INJECTION ONCE
Refills: 0 | Status: DISCONTINUED | OUTPATIENT
Start: 2024-07-05 | End: 2024-07-05

## 2024-07-05 RX ORDER — SODIUM CHLORIDE 0.9 % (FLUSH) 0.9 %
250 SYRINGE (ML) INJECTION ONCE
Refills: 0 | Status: COMPLETED | OUTPATIENT
Start: 2024-07-05 | End: 2024-07-06

## 2024-07-05 RX ADMIN — NOREPINEPHRINE BITARTRATE 5.26 MICROGRAM(S)/KG/MIN: 1 INJECTION INTRAVENOUS at 06:42

## 2024-07-05 RX ADMIN — Medication 1 APPLICATION(S): at 17:21

## 2024-07-05 RX ADMIN — CEFEPIME 100 MILLIGRAM(S): 1 INJECTION, POWDER, FOR SOLUTION INTRAMUSCULAR; INTRAVENOUS at 05:54

## 2024-07-05 RX ADMIN — Medication 25 MICROGRAM(S): at 06:13

## 2024-07-05 RX ADMIN — Medication 25 GRAM(S): at 20:21

## 2024-07-05 RX ADMIN — HEPARIN SODIUM 5000 UNIT(S): 50 INJECTION, SOLUTION INTRAVENOUS at 14:02

## 2024-07-05 RX ADMIN — Medication 100 MILLIGRAM(S): at 18:24

## 2024-07-05 RX ADMIN — POTASSIUM CHLORIDE 50 MILLIEQUIVALENT(S): 600 TABLET, FILM COATED, EXTENDED RELEASE ORAL at 16:43

## 2024-07-05 RX ADMIN — HEPARIN SODIUM 5000 UNIT(S): 50 INJECTION, SOLUTION INTRAVENOUS at 05:54

## 2024-07-05 RX ADMIN — NOREPINEPHRINE BITARTRATE 5.26 MICROGRAM(S)/KG/MIN: 1 INJECTION INTRAVENOUS at 08:27

## 2024-07-05 RX ADMIN — Medication 25 GRAM(S): at 18:16

## 2024-07-05 RX ADMIN — DOXYCYCLINE 100 MILLIGRAM(S): 100 CAPSULE ORAL at 17:15

## 2024-07-05 RX ADMIN — VANCOMYCIN HYDROCHLORIDE 250 MILLIGRAM(S): KIT at 18:21

## 2024-07-05 RX ADMIN — HEPARIN SODIUM 5000 UNIT(S): 50 INJECTION, SOLUTION INTRAVENOUS at 21:42

## 2024-07-05 RX ADMIN — Medication 100 GRAM(S): at 17:16

## 2024-07-05 NOTE — CHART NOTE - NSCHARTNOTEFT_GEN_A_CORE
BP still 'soft", though calculated MAP greater then 65. Seen at bedside. Speaks softly but able to have normal conversation. Denies pain but says she does feel weak. Bilateral radial pulses palpable but weak. Breathing is easy. Limited urine output so far. Will gently increase IV fluids rate but unless MAP goes under 65, hold off on pressor agents. BP still 'soft", though calculated MAP greater then 65 (I calculate 67). Seen at bedside. Speaks softly but able to have normal conversation. Denies pain but says she does feel weak which has been present since arrival. Bilateral radial pulses palpable but weak. Breathing is easy. Limited urine output so far. Will gently increase IV fluids rate but unless MAP goes under 65, hold off on pressor agents.

## 2024-07-05 NOTE — SWALLOW BEDSIDE ASSESSMENT ADULT - CONSISTENCIES ADMINISTERED
Render Note In Bullet Format When Appropriate: No Medical Necessity Clause: This procedure was medically necessary because the lesions that were treated were: thin liquid/pureed/minced & moist/soft & bite-sized/easy to chew Consent: The patient's consent was obtained including but not limited to risks of crusting, scabbing, blistering, scarring, darker or lighter pigmentary change, recurrence, incomplete removal and infection. Post-Care Instructions: I reviewed with the patient in detail post-care instructions. Patient is to wear sunprotection, and avoid picking at any of the treated lesions. Pt may apply Vaseline to crusted or scabbing areas. Detail Level: Detailed Medical Necessity Information: It is in your best interest to select a reason for this procedure from the list below. All of these items fulfill various CMS LCD requirements except the new and changing color options.

## 2024-07-05 NOTE — PROGRESS NOTE ADULT - SUBJECTIVE AND OBJECTIVE BOX
89-year-old female, past medical history of aortic stenosis, HTN, HLD, and anxiety brought in by daughter for weakness and difficulty ambulating for the last 4 days.  Up until 4 days ago, patient has been ambulating with a walker.  Patient's daughter also reports patient has decreased appetite and difficulty swallowing,  Patient states she just has no energy. Patient's daughter reports patient fell once while ambulating, but denies any head trauma or LOC. Patient denies fever/chills, chest pain/shortness of breath, abdominal pain, nausea/vomiting/diarrhea, urinary symptoms.    PAST MEDICAL & SURGICAL HISTORY:    HTN (hypertension)  Hypothyroid  Raysal's disease  Aortic stenosis  Anemia  Hip fracture  s/p right ORIF    Social History:    non smoker    MEDICATIONS  (STANDING):  cefepime   IVPB 1000 milliGRAM(s) IV Intermittent every 12 hours  dextrose 50% Injectable 12.5 Gram(s) IV Push once  heparin   Injectable 5000 Unit(s) SubCutaneous every 8 hours  insulin lispro (ADMELOG) corrective regimen sliding scale   SubCutaneous three times a day before meals  lactated ringers. 1000 milliLiter(s) (100 mL/Hr) IV Continuous <Continuous>  levothyroxine Injectable 25 MICROGram(s) IV Push <User Schedule>  midodrine. 5 milliGRAM(s) Oral once  vancomycin  IVPB 1000 milliGRAM(s) IV Intermittent every 24 hours    MEDICATIONS  (PRN):  acetaminophen  Suppository .. 650 milliGRAM(s) Rectal every 6 hours PRN Temp greater or equal to 38C (100.4F)  ondansetron Injectable 4 milliGRAM(s) IV Push every 8 hours PRN Nausea and/or Vomiting    Allergies    sulfonamides (Unknown)  penicillins (Unknown)    Intolerances    Vital Signs Last 24 Hrs  T(C): 36.5 (05 Jul 2024 05:32), Max: 38.1 (04 Jul 2024 16:05)  T(F): 97.7 (05 Jul 2024 05:32), Max: 100.6 (04 Jul 2024 16:05)  HR: 75 (05 Jul 2024 05:32) (75 - 99)  BP: 81/49 (05 Jul 2024 05:32) (81/49 - 116/69)  BP(mean): --  RR: 19 (04 Jul 2024 20:28) (18 - 19)  SpO2: 96% (05 Jul 2024 05:32) (93% - 96%)    Parameters below as of 04 Jul 2024 20:28  Patient On (Oxygen Delivery Method): room air    PHYSICAL EXAM:  GENERAL: Elderly female, NAD  HEAD:  Atraumatic, Normocephalic  EYES: EOMI, PERRLA, conjunctiva and sclera clear  ENMT: Dry mucous membranes  CHEST/LUNG: Bilateral breath sounds, decreased RLL; No rales, rhonchi or wheezing  HEART: S1,S2 Regular rate and rhythm; No murmurs, rubs, or gallops  ABDOMEN: Soft, nontender, nondistended, +BS  EXTREMITIES: No calf tenderness bilaterally or peripheral edema   NEUROLOGY: non-focal, BUNCH x4, alert &oriented x3    LAB                          8.9    17.12 )-----------( 300      ( 04 Jul 2024 16:20 )             28.0   07-04    138  |  96<L>  |  37<H>  ----------------------------<  141<H>  2.6<LL>   |  26  |  1.4    Ca    9.6      04 Jul 2024 16:20    TPro  6.6  /  Alb  3.4<L>  /  TBili  1.2  /  DBili  x   /  AST  23  /  ALT  12  /  AlkPhos  112  07-04    RAD    ACC: [id number] EXAM:  XR PELVIS AP ONLY 1-2 VIEWS   ORDERED BY: SAUMYA JORDAN     PROCEDURE DATE:  07/04/2024          INTERPRETATION:  CLINICAL HISTORY / REASON FOR EXAM: Pain status post fall    COMPARISON: Pelvis radiograph from December 16, 2018    TECHNIQUE: One view, AP pelvis radiograph    FINDINGS:    Status post left cephalomedullary nail. No acute displaced fracture.   Bilateral hip and lumbar spine degenerative changes. Osteopenia. Surgical   clips overlie the right lower pelvis.      IMPRESSION:    No acute displaced fracture.    --- End of Report ---            KACI STACY MD; Attending Radiologist  This document has been electronically signed. Jul 4 2024  8:53PM

## 2024-07-05 NOTE — CHART NOTE - NSCHARTNOTEFT_GEN_A_CORE
Critical care PA discussed patient's status with Dr Islas. Receiving another fluid bolus but will be transferred to ICU/CCU. I spoke to son Eddy and per his request, left voice mail message with his sister, Dominique

## 2024-07-05 NOTE — CONSULT NOTE ADULT - SUBJECTIVE AND OBJECTIVE BOX
ARIAS PUGA  89y, Female  Allergies    sulfonamides (Unknown)  penicillins (Unknown)    Intolerances    LOS  1d    HPI  HPI:  This is a 89-year-old female, past medical history of aortic stenosis, HTN, HLD, and anxiety brought in by daughter for weakness and difficulty ambulating for the last 4 days.  Up until 4 days ago, patient has been ambulating with a walker.  Patient's daughter also reports patient has decreased appetite and difficulty swallowing,  Patient states she just has no energy. Patient's daughter reports patient fell once while ambulating, but denies any head trauma or LOC. Patient denies fever/chills, chest pain/shortness of breath, abdominal pain, nausea/vomiting/diarrhea, urinary symptoms.   (04 Jul 2024 18:41)      INFECTIOUS DISEASE HISTORY:  Hospital course-  ID consulted for antimicrobial recommendations.     Prior hospital charts reviewed [Yes]  Primary team notes reviewed [Yes]  Other consultant notes reviewed [Yes]    REVIEW OF SYSTEMS:  CONSTITUTIONAL: No fever or chills  HEENT: No sore throat  RESPIRATORY: No cough, no shortness of breath  CARDIOVASCULAR: No chest pain or palpitations  GASTROINTESTINAL: No abdominal or epigastric pain  GENITOURINARY: No dysuria  NEUROLOGICAL: No headache/dizziness  MSK: No joint pain, erythema, or swelling; no back pain  SKIN: No itching, rashes  All other ROS negative except noted above    PAST MEDICAL & SURGICAL HISTORY:  HTN (hypertension)      Hypothyroid      Flaco's disease      Aortic stenosis      Anemia      Hip fracture  s/p right ORIF    SOCIAL HISTORY:  - No recent travel    FAMILY HISTORY: No pertinent PMH for first degree relatives.       ANTIMICROBIALS:  cefepime   IVPB 1000 every 12 hours  vancomycin  IVPB 1000 every 24 hours      ANTIMICROBIALS (past 90 days):  MEDICATIONS  (STANDING):  azithromycin  IVPB   255 mL/Hr IV Intermittent (07-04-24 @ 20:54)    cefepime   IVPB   100 mL/Hr IV Intermittent (07-04-24 @ 18:42)    cefepime   IVPB   100 mL/Hr IV Intermittent (07-05-24 @ 05:54)    vancomycin  IVPB   250 mL/Hr IV Intermittent (07-04-24 @ 16:34)        OTHER MEDS:   MEDICATIONS  (STANDING):  acetaminophen  Suppository .. 650 every 6 hours PRN  dextrose 50% Injectable 12.5 once  heparin   Injectable 5000 every 8 hours  levothyroxine Injectable 25 <User Schedule>  midodrine. 5 once  norepinephrine Infusion 0.05 <Continuous>  ondansetron Injectable 4 every 8 hours PRN      VITALS:  Vital Signs Last 24 Hrs  T(F): 97.9 (07-05-24 @ 14:42), Max: 100.6 (07-04-24 @ 16:05)    Vital Signs Last 24 Hrs  HR: 76 (07-05-24 @ 14:42) (75 - 99)  BP: 99/51 (07-05-24 @ 14:42) (81/49 - 136/78)  RR: 30 (07-05-24 @ 14:42)  SpO2: 99% (07-05-24 @ 14:42) (90% - 100%)  Wt(kg): --    EXAM:  GENERAL: NAD, Frail looking.   HEAD: No head lesions  NECK: Supple  CHEST/LUNG: Shallow breath sounds.   HEART: S1 S2  ABDOMEN: Soft, nontender  EXTREMITIES: No clubbing  NERVOUS SYSTEM: Alert and oriented to person  MSK: No joint erythema, swelling or pain  SKIN: No rashes or lesions, no superficial thrombophlebitis    Labs:                        8.4    23.61 )-----------( 306      ( 05 Jul 2024 12:26 )             25.2     07-05    136  |  96<L>  |  42<H>  ----------------------------<  124<H>  3.4<L>   |  22  |  1.7<H>    Ca    9.3      05 Jul 2024 11:22  Phos  3.7     07-05  Mg     1.2     07-05    TPro  5.9<L>  /  Alb  3.1<L>  /  TBili  1.3<H>  /  DBili  x   /  AST  154<H>  /  ALT  76<H>  /  AlkPhos  119<H>  07-05      WBC Trend:  WBC Count: 23.61 (07-05-24 @ 12:26)  WBC Count: 17.12 (07-04-24 @ 16:20)      Auto Neutrophil #: 21.49 K/uL (07-05-24 @ 12:26)  Band Neutrophils %: 3.0 % (07-05-24 @ 12:26)  Auto Neutrophil #: 13.75 K/uL (07-04-24 @ 16:20)      Creatine Trend:  Creatinine: 1.7 (07-05)  Creatinine: 1.4 (07-04)      Liver Biochemical Testing Trend:  Alanine Aminotransferase (ALT/SGPT): 76 *H* (07-05)  Alanine Aminotransferase (ALT/SGPT): 12 (07-04)  Aspartate Aminotransferase (AST/SGOT): 154 (07-05-24 @ 11:22)  Aspartate Aminotransferase (AST/SGOT): 23 (07-04-24 @ 16:20)  Bilirubin Total: 1.3 (07-05)  Bilirubin Total: 1.2 (07-04)      Trend LDH      Auto Eosinophil %: 0.0 % (07-05-24 @ 12:26)  Auto Eosinophil %: 0.3 % (07-04-24 @ 16:20)      Urinalysis Basic - ( 05 Jul 2024 11:22 )    Color: x / Appearance: x / SG: x / pH: x  Gluc: 124 mg/dL / Ketone: x  / Bili: x / Urobili: x   Blood: x / Protein: x / Nitrite: x   Leuk Esterase: x / RBC: x / WBC x   Sq Epi: x / Non Sq Epi: x / Bacteria: x        MICROBIOLOGY:    Female    Urinalysis with Rflx Culture (collected 04 Jul 2024 17:55)    Troponin T, High Sensitivity Result: 120 (07-04)  Troponin T, High Sensitivity Result: 135 (07-04)    Lactate, Blood: 1.9 (07-05 @ 11:22)  Blood Gas Venous - Lactate: 3.1 (07-04 @ 16:55)  Lactate, Blood: 2.0 (07-04 @ 16:20)        INFLAMMATORY MARKERS      RADIOLOGY & ADDITIONAL TESTS:  I have personally reviewed the imagings.  CXR      CT      CARDIOLOGY TESTING  12 Lead ECG:   Ventricular Rate 86 BPM    Atrial Rate 86 BPM    P-R Interval 198 ms    QRS Duration 102 ms    Q-T Interval 404 ms    QTC Calculation(Bazett) 483 ms    P Axis 75 degrees    R Axis 46 degrees    T Axis 235 degrees    Diagnosis Line Sinus rhythm withPremature atrial complexes  Left ventricular hypertrophy with repolarization abnormality  Abnormal ECG    Confirmed by DAVID HERR MD ([id number]) on 7/5/2024 6:41:35 AM (07-04-24 @ 16:51)             ARIAS PUGA  89y, Female  Allergies    sulfonamides (Unknown)  penicillins (Unknown)    Intolerances    LOS  1d    HPI  HPI:  This is a 89-year-old female, past medical history of aortic stenosis, HTN, HLD, and anxiety brought in by daughter for weakness and difficulty ambulating for the last 4 days.  Up until 4 days ago, patient has been ambulating with a walker.  Patient's daughter also reports patient has decreased appetite and difficulty swallowing,  Patient states she just has no energy. Patient's daughter reports patient fell once while ambulating, but denies any head trauma or LOC. Patient denies fever/chills, chest pain/shortness of breath, abdominal pain, nausea/vomiting/diarrhea, urinary symptoms.   (04 Jul 2024 18:41)      INFECTIOUS DISEASE HISTORY:  Hospital course-  ID consulted for antimicrobial recommendations.     Prior hospital charts reviewed [Yes]  Primary team notes reviewed [Yes]  Other consultant notes reviewed [Yes]    REVIEW OF SYSTEMS:  CONSTITUTIONAL: No fever or chills  HEENT: No sore throat  RESPIRATORY: No cough, no shortness of breath  CARDIOVASCULAR: No chest pain or palpitations  GASTROINTESTINAL: No abdominal or epigastric pain  GENITOURINARY: No dysuria  NEUROLOGICAL: No headache/dizziness  MSK: No joint pain, erythema, or swelling; no back pain  SKIN: No itching, rashes  All other ROS negative except noted above    PAST MEDICAL & SURGICAL HISTORY:  HTN (hypertension)      Hypothyroid      Flaco's disease      Aortic stenosis      Anemia      Hip fracture  s/p right ORIF    SOCIAL HISTORY:  - No recent travel    FAMILY HISTORY: No pertinent PMH for first degree relatives.       ANTIMICROBIALS:  cefepime   IVPB 1000 every 12 hours  vancomycin  IVPB 1000 every 24 hours      ANTIMICROBIALS (past 90 days):  MEDICATIONS  (STANDING):  azithromycin  IVPB   255 mL/Hr IV Intermittent (07-04-24 @ 20:54)    cefepime   IVPB   100 mL/Hr IV Intermittent (07-04-24 @ 18:42)    cefepime   IVPB   100 mL/Hr IV Intermittent (07-05-24 @ 05:54)    vancomycin  IVPB   250 mL/Hr IV Intermittent (07-04-24 @ 16:34)        OTHER MEDS:   MEDICATIONS  (STANDING):  acetaminophen  Suppository .. 650 every 6 hours PRN  dextrose 50% Injectable 12.5 once  heparin   Injectable 5000 every 8 hours  levothyroxine Injectable 25 <User Schedule>  midodrine. 5 once  norepinephrine Infusion 0.05 <Continuous>  ondansetron Injectable 4 every 8 hours PRN      VITALS:  Vital Signs Last 24 Hrs  T(F): 97.9 (07-05-24 @ 14:42), Max: 100.6 (07-04-24 @ 16:05)    Vital Signs Last 24 Hrs  HR: 76 (07-05-24 @ 14:42) (75 - 99)  BP: 99/51 (07-05-24 @ 14:42) (81/49 - 136/78)  RR: 30 (07-05-24 @ 14:42)  SpO2: 99% (07-05-24 @ 14:42) (90% - 100%)  Wt(kg): --    EXAM:  GENERAL: NAD, Frail looking.   HEAD: No head lesions  NECK: Supple  CHEST/LUNG: Shallow breath sounds.   HEART: S1 S2  ABDOMEN: Soft, nontender  EXTREMITIES: No clubbing  NERVOUS SYSTEM: Alert and oriented to person  MSK: No joint erythema, swelling or pain  SKIN: No rashes or lesions, no superficial thrombophlebitis    Labs:                        8.4    23.61 )-----------( 306      ( 05 Jul 2024 12:26 )             25.2     07-05    136  |  96<L>  |  42<H>  ----------------------------<  124<H>  3.4<L>   |  22  |  1.7<H>    Ca    9.3      05 Jul 2024 11:22  Phos  3.7     07-05  Mg     1.2     07-05    TPro  5.9<L>  /  Alb  3.1<L>  /  TBili  1.3<H>  /  DBili  x   /  AST  154<H>  /  ALT  76<H>  /  AlkPhos  119<H>  07-05      WBC Trend:  WBC Count: 23.61 (07-05-24 @ 12:26)  WBC Count: 17.12 (07-04-24 @ 16:20)      Auto Neutrophil #: 21.49 K/uL (07-05-24 @ 12:26)  Band Neutrophils %: 3.0 % (07-05-24 @ 12:26)  Auto Neutrophil #: 13.75 K/uL (07-04-24 @ 16:20)      Creatine Trend:  Creatinine: 1.7 (07-05)  Creatinine: 1.4 (07-04)      Liver Biochemical Testing Trend:  Alanine Aminotransferase (ALT/SGPT): 76 *H* (07-05)  Alanine Aminotransferase (ALT/SGPT): 12 (07-04)  Aspartate Aminotransferase (AST/SGOT): 154 (07-05-24 @ 11:22)  Aspartate Aminotransferase (AST/SGOT): 23 (07-04-24 @ 16:20)  Bilirubin Total: 1.3 (07-05)  Bilirubin Total: 1.2 (07-04)      Trend LDH      Auto Eosinophil %: 0.0 % (07-05-24 @ 12:26)  Auto Eosinophil %: 0.3 % (07-04-24 @ 16:20)      Urinalysis Basic - ( 05 Jul 2024 11:22 )    Color: x / Appearance: x / SG: x / pH: x  Gluc: 124 mg/dL / Ketone: x  / Bili: x / Urobili: x   Blood: x / Protein: x / Nitrite: x   Leuk Esterase: x / RBC: x / WBC x   Sq Epi: x / Non Sq Epi: x / Bacteria: x        MICROBIOLOGY:    Female    Urinalysis with Rflx Culture (collected 04 Jul 2024 17:55)    Troponin T, High Sensitivity Result: 120 (07-04)  Troponin T, High Sensitivity Result: 135 (07-04)    Lactate, Blood: 1.9 (07-05 @ 11:22)  Blood Gas Venous - Lactate: 3.1 (07-04 @ 16:55)  Lactate, Blood: 2.0 (07-04 @ 16:20)        INFLAMMATORY MARKERS      RADIOLOGY & ADDITIONAL TESTS:  I have personally reviewed the imagings.  CXR      CT  < from: Xray Pelvis AP only (07.04.24 @ 17:27) >    Status post left cephalomedullary nail. No acute displaced fracture.   Bilateral hip and lumbar spine degenerative changes. Osteopenia. Surgical   clips overlie the right lower pelvis.    < end of copied text >  < from: Xray Chest 1 View-PORTABLE IMMEDIATE (07.04.24 @ 17:27) >    IMPRESSION:    Right greater than left bibasilar airspace opacities/effusions consistent   with an infectious/inflammatory process.    --- End of Report ---      < end of copied text >  < from: TTE Echo Complete w/o Contrast w/ Doppler (07.05.24 @ 09:00) >  Summary:   1. Moderately to severely decreased global left ventricular systolic   function.   2. LV Ejection Fraction by Maria's Method with a biplane EF of 30 %.   3. Multiple left ventricular regional wall motion abnormalities exist.   See wall motion findings.   4. Spectral Doppler shows pseudonormal pattern of left ventricular   myocardial filling (Grade II diastolic dysfunction).   5. Normal right ventricular size and function.   6. Moderately enlarged left atrium. LA volume Index is 44.4 ml/m².   7. Normal right atrial size.   8. Severe aortic valve stenosis.   9. Moderate mitral annular calcification.  10. Moderate mitral valve regurgitation.  11. Moderate thickening and calcification of the anterior and posterior   mitral valve leaflets.  12. Mild-moderate tricuspid regurgitation.  13. Estimated pulmonary artery systolic pressure is 74.6 mmHg assuming a   right atrial pressure of 8 mmHg, which is consistent with severe   pulmonary hypertension.  14. There is no evidence of pericardial effusion.    < end of copied text >      CARDIOLOGY TESTING  12 Lead ECG:   Ventricular Rate 86 BPM    Atrial Rate 86 BPM    P-R Interval 198 ms    QRS Duration 102 ms    Q-T Interval 404 ms    QTC Calculation(Bazett) 483 ms    P Axis 75 degrees    R Axis 46 degrees    T Axis 235 degrees    Diagnosis Line Sinus rhythm withPremature atrial complexes  Left ventricular hypertrophy with repolarization abnormality  Abnormal ECG    Confirmed by DAVID HERR MD ([id number]) on 7/5/2024 6:41:35 AM (07-04-24 @ 16:51)

## 2024-07-05 NOTE — SWALLOW BEDSIDE ASSESSMENT ADULT - NS SPL SWALLOW CLINIC TRIAL FT
+ toleration observed without overt symptoms of penetration/aspiration for puree, m&m, sbs, regular solid w thin liquids   HOWEVER daughter reports pt coughs on thin liquids at home   monitor for s/s of aspiration and penetration + toleration observed without overt symptoms of penetration/aspiration for puree, m&m, sbs, regular solid w thin liquids   HOWEVER daughter reports pt coughs on thin liquids at home   monitor for s/s of aspiration and penetration  needed reminders to swallow

## 2024-07-05 NOTE — CHART NOTE - NSCHARTNOTEFT_GEN_A_CORE
Called by medicine team to evaluate for hypotension. Seen at bedside immediately.     89-year-old female, past medical history of aortic stenosis, HTN, HLD, and anxiety brought in by daughter for weakness and difficulty ambulating for the last 4 days. Admitted overnight with pneumonia.   Patient states that she is asymptomatic at this time. Denies shortness of breath, weakness, chest pain, dizziness, and any acute complaint at this time.     ICU Vital Signs Last 24 Hrs  T(C): 36.5 (2024 05:32), Max: 38.1 (2024 16:05)  T(F): 97.7 (2024 05:32), Max: 100.6 (2024 16:05)  HR: 75 (2024 05:32) (75 - 99)  BP: 81/49 (2024 05:32) (81/49 - 116/69)  BP(mean): --  ABP: --  ABP(mean): --  RR: 19 (2024 20:28) (18 - 19)  SpO2: 96% (2024 05:32) (93% - 96%)    O2 Parameters below as of 2024 20:28  Patient On (Oxygen Delivery Method): room air      PHYSICAL EXAM:      Constitutional: NAD, A + O x 2 (consistent with prior exam). Breathing comfortably on room air at this time.     Eyes: PEERL, EOM intact b/l.     Neck: No JVD. Supple, non-tender, trachea midline.     Respiratory: Rales in lung bases b/l. No rhonchi/ wheeze.     Cardiovascular: S1/ S2 present and clear, no murmur, gallops, or rub.     Gastrointestinal: Abd soft + non-tender. BS NA x 4 quadrants.     Extremities: No peripheral edema, clubbing, or cyanosis in Ext x 4.     Vascular: DP + radial Pulse 2+ b/l.     Skin: No rash/ focal lesions.     Neuro: Speech clear. Strength 5/5 in ext x 4.       LABS:                          8.9    17.12 )-----------( 300      ( 2024 16:20 )             28.0     07-04    138  |  96<L>  |  37<H>  ----------------------------<  141<H>  2.6<LL>   |  26  |  1.4    Ca    9.6      2024 16:20    TPro  6.6  /  Alb  3.4<L>  /  TBili  1.2  /  DBili  x   /  AST  23  /  ALT  12  /  AlkPhos  112  07-04    LIVER FUNCTIONS - ( 2024 16:20 )  Alb: 3.4 g/dL / Pro: 6.6 g/dL / ALK PHOS: 112 U/L / ALT: 12 U/L / AST: 23 U/L / GGT: x           PT/INR - ( 2024 16:20 )   PT: 18.30 sec;   INR: 1.60 ratio         PTT - ( 2024 16:20 )  PTT:29.9 sec  Urinalysis Basic - ( 2024 17:55 )    Color: Dark Yellow / Appearance: Clear / S.024 / pH: x  Gluc: x / Ketone: Negative mg/dL  / Bili: Small / Urobili: 1.0 mg/dL   Blood: x / Protein: Trace mg/dL / Nitrite: Negative   Leuk Esterase: Negative / RBC: x / WBC x   Sq Epi: x / Non Sq Epi: x / Bacteria: x    PLAN    89-year-old female, past medical history of aortic stenosis, HTN, HLD, and anxiety brought in by daughter for weakness and difficulty ambulating for the last 4 days. Admitted overnight with pneumonia. Found to be hypotensive to 80s Systolic this AM, asymptomatic.     Septic shock 2/2 PNA   Start on peripheral Levophed   CXR shows RLL opacity, f/u Serial exam   Cont Abx   Received 2.5L fluid overnight, caution for overload (advanced age + Aortic Stenosis)   Trend lactate  F/u Cultures + ID eval     Dispo: Upgrade to Critical Care Service  Case Discussed with Intensivist + Hospitalist   Dr. Islas Notified     Sherwin DUMONT Called by medicine team to evaluate for hypotension. Seen at bedside immediately.     89-year-old female, past medical history of aortic stenosis, HTN, HLD, and anxiety brought in by daughter for weakness and difficulty ambulating for the last 4 days. Admitted overnight with pneumonia.   Patient states that she is asymptomatic at this time. Denies shortness of breath, weakness, chest pain, dizziness, and any acute complaint at this time.     ICU Vital Signs Last 24 Hrs  T(C): 36.5 (2024 05:32), Max: 38.1 (2024 16:05)  T(F): 97.7 (2024 05:32), Max: 100.6 (2024 16:05)  HR: 75 (2024 05:32) (75 - 99)  BP: 81/49 (2024 05:32) (81/49 - 116/69)  BP(mean): --  ABP: --  ABP(mean): --  RR: 19 (2024 20:28) (18 - 19)  SpO2: 96% (2024 05:32) (93% - 96%)    O2 Parameters below as of 2024 20:28  Patient On (Oxygen Delivery Method): room air      PHYSICAL EXAM:      Constitutional: NAD, A + O x 2 (consistent with prior exam). Breathing comfortably on room air at this time.     Eyes: PEERL, EOM intact b/l.     Neck: No JVD. Supple, non-tender, trachea midline.     Respiratory: Rales in lung bases b/l. No rhonchi/ wheeze.     Cardiovascular: S1/ S2 present and clear, no murmur, gallops, or rub.     Gastrointestinal: Abd soft + non-tender. BS NA x 4 quadrants.     Extremities: No peripheral edema, clubbing, or cyanosis in Ext x 4.     Vascular: DP + radial Pulse 2+ b/l.     Skin: No rash/ focal lesions.     Neuro: Speech clear. Strength 5/5 in ext x 4.       LABS:                          8.9    17.12 )-----------( 300      ( 2024 16:20 )             28.0     07-04    138  |  96<L>  |  37<H>  ----------------------------<  141<H>  2.6<LL>   |  26  |  1.4    Ca    9.6      2024 16:20    TPro  6.6  /  Alb  3.4<L>  /  TBili  1.2  /  DBili  x   /  AST  23  /  ALT  12  /  AlkPhos  112  07-04    LIVER FUNCTIONS - ( 2024 16:20 )  Alb: 3.4 g/dL / Pro: 6.6 g/dL / ALK PHOS: 112 U/L / ALT: 12 U/L / AST: 23 U/L / GGT: x           PT/INR - ( 2024 16:20 )   PT: 18.30 sec;   INR: 1.60 ratio         PTT - ( 2024 16:20 )  PTT:29.9 sec  Urinalysis Basic - ( 2024 17:55 )    Color: Dark Yellow / Appearance: Clear / S.024 / pH: x  Gluc: x / Ketone: Negative mg/dL  / Bili: Small / Urobili: 1.0 mg/dL   Blood: x / Protein: Trace mg/dL / Nitrite: Negative   Leuk Esterase: Negative / RBC: x / WBC x   Sq Epi: x / Non Sq Epi: x / Bacteria: x    PLAN    89-year-old female, past medical history of aortic stenosis, HTN, HLD, and anxiety brought in by daughter for weakness and difficulty ambulating for the last 4 days. Admitted overnight with pneumonia. Found to be hypotensive to 80s Systolic this AM, asymptomatic.     Septic shock 2/2 PNA   Start on peripheral Levophed   CXR shows RLL opacity, f/u Serial exam   Cont Abx   Received 2.5L fluid overnight, caution for overload (advanced age + Aortic Stenosis)   Trend lactate  F/u Cultures + ID eval     Hypokalemia   Potassium Supplemented overnight   Trend K + Mg lvl     Dispo: Upgrade to Critical Care Service  Case Discussed with Intensivist + Hospitalist   Dr. Islas Notified     Sherwin DUMONT

## 2024-07-05 NOTE — PROGRESS NOTE ADULT - ASSESSMENT
89-year-old female, past medical history of aortic stenosis, HTN, HLD, and anxiety brought in by daughter for weakness and difficulty ambulating for the last 4 days.  Up until 4 days ago, patient has been ambulating with a walker.  Patient's daughter also reports patient has decreased appetite and difficulty swallowing,  Patient states she just has no energy. Patient's daughter reports patient fell once while ambulating, but denies any head trauma or LOC. Patient denies fever/chills, chest pain/shortness of breath, abdominal pain, nausea/vomiting/diarrhea, urinary symptoms.      #Sepsis POA 2/2 pneumonia   -Admit to inpatient level of care under telemetry  -CXR: (+)RLL opacity  -WBC 17.12, Lactate 3.1, Tmax 100.6 rectal   -s/p Vancomycin, Azithromycin, and Cefepime in ED   -C/w cefepime and vancomycin  -C/w IVF    -ID consult   -F/U procalcitonin, Urine Strep/Legionella, MRSA PCR, sputum culture, blood cultures    -F/U repeat lactate   -Monitor for fevers and leukocytosis  -Pain control and antipyretics prn   -Supplemental O2 prn   -Monitor I&O    #Elevated troponin  -Patient denies CP.   -EKG: Sinus rhythm with PAC, LVH,    -C/w Telemetry monitoring   -F/U cardiology consult    -Continue to trend     #Hypokalemia  -K 2.6, repleted with 40 KClor and 20 KCl IV x1   -F/U repeat labs and replete prn     #Aortic stenosis   -F/U echo  -Echo 2018: normal LV function, G1DD, mild MR/AR, severe AS     #Hypothyroidism  -c/w IV synthroid 25 mcg- resume PO synthroid 50 mcg once cleared by speech and swallow     #HTN/HLD  -holding statin and HCTZ - resume once cleared by speech and swallow     #Anemia (Baseline Hgb 9-11)  -Continue to monitor     #Diet: NPO, pending speech and swallow   #DVT ppx: HSQ   #Activity: OOB with assistance   #ADVANCE CARE CPR  #DISPOSITION:  ACUTE

## 2024-07-05 NOTE — PROGRESS NOTE ADULT - ASSESSMENT
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  Feeding per speech    RENAL: monitor lytes, follow K, trend lactate, f/u labs    INFECTIOUS DISEASE: f/u pan cx, MRSA/RVP/ Urine legionaires/strep (-), UA noted, abx per ID    HEMATOLOGICAL:  DVT prophylaxis.    ENDOCRINE:  Follow up FS.  Insulin protocol if needed, c/w home synthroid    MUSCULOSKELETAL: OOBTC, OT/PT    SDU

## 2024-07-05 NOTE — CONSULT NOTE ADULT - ASSESSMENT
ASSESSMENT  This is a 89-year-old female, past medical history of aortic stenosis, HTN, HLD, and anxiety brought in by daughter for weakness and difficulty ambulating for the last 4 days.    IMPRESSION  #Acute hypoxic respiratory failure. Concern for Community acquired pneumonia  #Shock- Likely cardiogenic, Severe aortic stenosis. 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  MRSA Nares negative    RECOMMENDATIONS  -Collect sputum cultures if possible.   -Follow up with blood cultures.  -Consider obtaining Renal/Bladder US and RUQ US.   -For now keep on IV ceftriaxone 1 gram q 24 hours and doxycycline 100mg BID PO/IV. (S/D of abx 7/4/24)  -D/C other abx.   -Off loading to prevent pressure sores and preventive measures to avoid aspiration.   -Guarded prognosis.     If any questions, please send a message or call on [employer] Teams  Please continue to update ID with any pertinent new laboratory or radiographic findings.    Demario Gill M.D  Infectious Diseases Attending/   [employer] at [facility]

## 2024-07-05 NOTE — CONSULT NOTE ADULT - ASSESSMENT
This is a 89-year-old female, past medical history of aortic stenosis, HTN, HLD, and anxiety brought in by daughter for weakness and difficulty ambulating for the last 4 days.  Up until 4 days ago, patient has been ambulating with a walker.  Patient's daughter also reports patient has decreased appetite and difficulty swallowing, Patient with severe AS. She is lying flat. She denies chest pain or SOB. Patient with pneumonia being treated. Troponin elevated because demand ischemia.. Correct k.. She has AS. She may not be candidate for TAVR. If candidate consider outpatient evaluation for TAVR. Echo

## 2024-07-05 NOTE — PROGRESS NOTE ADULT - SUBJECTIVE AND OBJECTIVE BOX
Patient is a 89y old  Female who presents with a chief complaint of sepsis POA, pneumonia, hypokalemia (2024 07:36)      Over Night Events: Yesterday was hypotensive, was started on low dose pressors. Feeling better today.       ROS:     All ROS are negative except HPI       PHYSICAL EXAM    ICU Vital Signs Last 24 Hrs  T(C): 37.1 (2024 08:29), Max: 38.1 (2024 16:05)  T(F): 98.7 (2024 08:29), Max: 100.6 (2024 16:05)  HR: 77 (2024 08:30) (75 - 99)  BP: 110/51 (2024 08:30) (81/49 - 136/78)  BP(mean): 73 (2024 08:30) (73 - 98)  ABP: --  ABP(mean): --  RR: 25 (2024 08:30) (18 - 47)  SpO2: 90% (2024 08:30) (90% - 96%)    O2 Parameters below as of 2024 08:30  Patient On (Oxygen Delivery Method): room air      CONSTITUTIONAL:   NAD    CARDIAC:   Normal rate,   Regular rhythm.    No edema    RESPIRATORY:   No wheezing  BL crackles    GASTROINTESTINAL:  Abdomen soft,   Non-tender,   No guarding,    MUSCULOSKELETAL:   Range of motion is not limited,  No clubbing, cyanosis    NEUROLOGICAL:   Alert and oriented     SKIN:   Skin normal color for race,   Warm and dry and intact.   No evidence of rash.    24 @ 07:01  -  24 @ 07:00  --------------------------------------------------------  IN:  Total IN: 0 mL    OUT:    Intermittent Catheterization - Urethral (mL): 50 mL  Total OUT: 50 mL    Total NET: -50 mL          LABS:                            8.9    17.12 )-----------( 300      ( 2024 16:20 )             28.0                                               07-04    138  |  96<L>  |  37<H>  ----------------------------<  141<H>  2.6<LL>   |  26  |  1.4    Ca    9.6      2024 16:20    TPro  6.6  /  Alb  3.4<L>  /  TBili  1.2  /  DBili  x   /  AST  23  /  ALT  12  /  AlkPhos  112  07-04      PT/INR - ( 2024 16:20 )   PT: 18.30 sec;   INR: 1.60 ratio         PTT - ( 2024 16:20 )  PTT:29.9 sec                                       Urinalysis Basic - ( 2024 17:55 )    Color: Dark Yellow / Appearance: Clear / S.024 / pH: x  Gluc: x / Ketone: Negative mg/dL  / Bili: Small / Urobili: 1.0 mg/dL   Blood: x / Protein: Trace mg/dL / Nitrite: Negative   Leuk Esterase: Negative / RBC: x / WBC x   Sq Epi: x / Non Sq Epi: x / Bacteria: x                                                  LIVER FUNCTIONS - ( 2024 16:20 )  Alb: 3.4 g/dL / Pro: 6.6 g/dL / ALK PHOS: 112 U/L / ALT: 12 U/L / AST: 23 U/L / GGT: x                                                  Urinalysis with Rflx Culture (collected 2024 17:55)                                                                                           MEDICATIONS  (STANDING):  cefepime   IVPB 1000 milliGRAM(s) IV Intermittent every 12 hours  dextrose 50% Injectable 12.5 Gram(s) IV Push once  heparin   Injectable 5000 Unit(s) SubCutaneous every 8 hours  insulin lispro (ADMELOG) corrective regimen sliding scale   SubCutaneous three times a day before meals  lactated ringers. 1000 milliLiter(s) (100 mL/Hr) IV Continuous <Continuous>  levothyroxine Injectable 25 MICROGram(s) IV Push <User Schedule>  midodrine. 5 milliGRAM(s) Oral once  norepinephrine Infusion 0.05 MICROgram(s)/kG/Min (5.26 mL/Hr) IV Continuous <Continuous>  sodium chloride 0.9% Bolus 250 milliLiter(s) IV Bolus once  vancomycin  IVPB 1000 milliGRAM(s) IV Intermittent every 24 hours    MEDICATIONS  (PRN):  acetaminophen  Suppository .. 650 milliGRAM(s) Rectal every 6 hours PRN Temp greater or equal to 38C (100.4F)  ondansetron Injectable 4 milliGRAM(s) IV Push every 8 hours PRN Nausea and/or Vomiting      New X-rays reviewed:                                                                                  ECHO

## 2024-07-05 NOTE — PROGRESS NOTE ADULT - ATTENDING COMMENTS
Attending Statement: I have personally performed a face to face diagnostic evaluation on this patient. The patient is suffering from:  CAP, possible aspiration  Sepsis POA  AS with troponins  Hypokalemia   Hypothyroidism  I have made amendments to the documentation where necessary. I have personally seen and examined this patient.  I have fully participated in the care of this patient.  I have reviewed all pertinent clinical information, including history, physical exam, plan and note.

## 2024-07-05 NOTE — SWALLOW BEDSIDE ASSESSMENT ADULT - ORAL PHASE
Within functional limits oral retention + needed reminders to swallow - daughter says this doesn't happen at home

## 2024-07-05 NOTE — SWALLOW BEDSIDE ASSESSMENT ADULT - SWALLOW EVAL: DIAGNOSIS
+ toleration observed without overt symptoms of penetration/aspiration for puree, m&m, sbs, regular solid w thin liquids mild oral dysphagia + toleration observed without overt symptoms of penetration/aspiration for puree, m&m, sbs, regular solid w thin liquids

## 2024-07-05 NOTE — CONSULT NOTE ADULT - SUBJECTIVE AND OBJECTIVE BOX
CARDIOLOGY CONSULT NOTE     CHIEF COMPLAINT/REASON FOR CONSULT:    HPI:  This is a 89-year-old female, past medical history of aortic stenosis, HTN, HLD, and anxiety brought in by daughter for weakness and difficulty ambulating for the last 4 days.  Up until 4 days ago, patient has been ambulating with a walker.  Patient's daughter also reports patient has decreased appetite and difficulty swallowing,  Patient states she just has no energy. Patient's daughter reports patient fell once while ambulating, but denies any head trauma or LOC. Patient denies fever/chills, chest pain/shortness of breath, abdominal pain, nausea/vomiting/diarrhea, urinary symptoms.   (04 Jul 2024 18:41)      PAST MEDICAL & SURGICAL HISTORY:  HTN (hypertension)      Hypothyroid      Flaco's disease      Aortic stenosis      Anemia      Hip fracture  s/p right ORIF          Cardiac Risks:   x[ ]HTN, [ ] DM, [ ] Smoking, [ ] FH,  [x ] Lipids  x      MEDICATIONS:  MEDICATIONS  (STANDING):  cefepime   IVPB 1000 milliGRAM(s) IV Intermittent every 12 hours  dextrose 50% Injectable 12.5 Gram(s) IV Push once  heparin   Injectable 5000 Unit(s) SubCutaneous every 8 hours  insulin lispro (ADMELOG) corrective regimen sliding scale   SubCutaneous three times a day before meals  lactated ringers. 1000 milliLiter(s) (100 mL/Hr) IV Continuous <Continuous>  levothyroxine Injectable 25 MICROGram(s) IV Push <User Schedule>  midodrine. 5 milliGRAM(s) Oral once  norepinephrine Infusion 0.05 MICROgram(s)/kG/Min (5.26 mL/Hr) IV Continuous <Continuous>  sodium chloride 0.9% Bolus 250 milliLiter(s) IV Bolus once  vancomycin  IVPB 1000 milliGRAM(s) IV Intermittent every 24 hours      FAMILY HISTORY:      SOCIAL HISTORY:        Allergies    sulfonamides (Unknown)  penicillins (Unknown)        	    REVIEW OF SYSTEMS:  CONSTITUTIONAL: No fever, weight loss, or fatigue  EYES: No eye pain, visual disturbances, or discharge  ENMT:  No difficulty hearing, tinnitus, vertigo; No sinus or throat pain  NECK: No pain or stiffness  RESPIRATORY: No cough, wheezing, chills or hemoptysis; No Shortness of Breath  CARDIOVASCULAR See above  GASTROINTESTINAL: No abdominal or epigastric pain. No nausea, vomiting, or hematemesis; No diarrhea or constipation. No melena or hematochezia.  GENITOURINARY: No dysuria, frequency, hematuria, or incontinence  NEUROLOGICAL: No headaches, memory loss, loss of strength, numbness, or tremors  SKIN: No itching, burning, rashes, or lesions   	      PHYSICAL EXAM:  T(C): 36.5 (07-05-24 @ 05:32), Max: 38.1 (07-04-24 @ 16:05)  HR: 75 (07-05-24 @ 05:32) (75 - 99)  BP: 125/73 (07-05-24 @ 07:35) (81/49 - 126/78)  RR: 19 (07-04-24 @ 20:28) (18 - 19)  SpO2: 96% (07-05-24 @ 05:32) (93% - 96%)  Wt(kg): --  I&O's Summary    04 Jul 2024 07:01  -  05 Jul 2024 07:00  --------------------------------------------------------  IN: 0 mL / OUT: 50 mL / NET: -50 mL        Appearance: Normal	  Psychiatry: A & O x 3, Mood & affect appropriate  HEENT:   Normal oral mucosa, PERRL, EOMI	  Lymphatic: No lymphadenopathy  Cardiovascular: Normal S1  decreased s2 II/VI as murmur  Respiratory: Lungs clear to auscultation	  Gastrointestinal:  Soft, Non-tender, + BS	  Skin: No rashes, No ecchymoses, No cyanosis	  Neurologic: Non-focal  Extremities: Normal range of motion, No clubbing, cyanosis or edema  Vascular: Peripheral pulses palpable 2+ bilaterally      ECG:  	< from: 12 Lead ECG (07.04.24 @ 16:51) >  Diagnosis Line Sinus rhythm withPremature atrial complexes  Left ventricular hypertrophy with repolarization abnormality  Abnormal ECG    Confirmed by DAVID HERR MD ([id number]) on 7/5/2024 6:41:35 AM    < end of copied text >    ECHO    < from: Transthoracic Echocardiogram (05.18.18 @ 12:10) >  Summary:   1. Normal global left ventricular systolic function.   2. Mildly increased LV wall thickness.   3. Spectral Doppler shows impaired relaxation pattern of left   ventricular myocardial filling (Grade I diastolic dysfunction).   4. Mild mitral valve regurgitation.   5. Mild to moderate mitral annular calcification.   6. Thickening of the anterior mitral valve leaflet.   7. PSAP at least 35.   8. Mild aortic regurgitation.   9. Peak gradient of 85 with a mean of 52 c/w severe AS.    < end of copied text >  	  LABS:	 	    CARDIAC MARKERS:                                    8.9    17.12 )-----------( 300      ( 04 Jul 2024 16:20 )             28.0     07-04    138  |  96<L>  |  37<H>  ----------------------------<  141<H>  2.6<LL>   |  26  |  1.4    Ca    9.6      04 Jul 2024 16:20    TPro  6.6  /  Alb  3.4<L>  /  TBili  1.2  /  DBili  x   /  AST  23  /  ALT  12  /  AlkPhos  112  07-04    PT/INR - ( 04 Jul 2024 16:20 )   PT: 18.30 sec;   INR: 1.60 ratio         PTT - ( 04 Jul 2024 16:20 )  PTT:29.9 sec

## 2024-07-06 LAB
ALBUMIN SERPL ELPH-MCNC: 2.8 G/DL — LOW (ref 3.5–5.2)
ALP SERPL-CCNC: 122 U/L — HIGH (ref 30–115)
ALT FLD-CCNC: 67 U/L — HIGH (ref 0–41)
ANION GAP SERPL CALC-SCNC: 17 MMOL/L — HIGH (ref 7–14)
AST SERPL-CCNC: 81 U/L — HIGH (ref 0–41)
BASOPHILS # BLD AUTO: 0.06 K/UL — SIGNIFICANT CHANGE UP (ref 0–0.2)
BASOPHILS NFR BLD AUTO: 0.3 % — SIGNIFICANT CHANGE UP (ref 0–1)
BILIRUB SERPL-MCNC: 1 MG/DL — SIGNIFICANT CHANGE UP (ref 0.2–1.2)
BUN SERPL-MCNC: 48 MG/DL — HIGH (ref 10–20)
CALCIUM SERPL-MCNC: 9.9 MG/DL — SIGNIFICANT CHANGE UP (ref 8.4–10.5)
CHLORIDE SERPL-SCNC: 99 MMOL/L — SIGNIFICANT CHANGE UP (ref 98–110)
CO2 SERPL-SCNC: 22 MMOL/L — SIGNIFICANT CHANGE UP (ref 17–32)
CREAT SERPL-MCNC: 1.8 MG/DL — HIGH (ref 0.7–1.5)
EGFR: 26 ML/MIN/1.73M2 — LOW
EOSINOPHIL # BLD AUTO: 0.13 K/UL — SIGNIFICANT CHANGE UP (ref 0–0.7)
EOSINOPHIL NFR BLD AUTO: 0.7 % — SIGNIFICANT CHANGE UP (ref 0–8)
GLUCOSE SERPL-MCNC: 118 MG/DL — HIGH (ref 70–99)
HCT VFR BLD CALC: 24.7 % — LOW (ref 37–47)
HGB BLD-MCNC: 8 G/DL — LOW (ref 12–16)
IMM GRANULOCYTES NFR BLD AUTO: 0.8 % — HIGH (ref 0.1–0.3)
LYMPHOCYTES # BLD AUTO: 1.28 K/UL — SIGNIFICANT CHANGE UP (ref 1.2–3.4)
LYMPHOCYTES # BLD AUTO: 7.1 % — LOW (ref 20.5–51.1)
MAGNESIUM SERPL-MCNC: 2.9 MG/DL — HIGH (ref 1.8–2.4)
MCHC RBC-ENTMCNC: 27.8 PG — SIGNIFICANT CHANGE UP (ref 27–31)
MCHC RBC-ENTMCNC: 32.4 G/DL — SIGNIFICANT CHANGE UP (ref 32–37)
MCV RBC AUTO: 85.8 FL — SIGNIFICANT CHANGE UP (ref 81–99)
MONOCYTES # BLD AUTO: 1.7 K/UL — HIGH (ref 0.1–0.6)
MONOCYTES NFR BLD AUTO: 9.4 % — HIGH (ref 1.7–9.3)
NEUTROPHILS # BLD AUTO: 14.68 K/UL — HIGH (ref 1.4–6.5)
NEUTROPHILS NFR BLD AUTO: 81.7 % — HIGH (ref 42.2–75.2)
NRBC # BLD: 0 /100 WBCS — SIGNIFICANT CHANGE UP (ref 0–0)
PHOSPHATE SERPL-MCNC: 3.3 MG/DL — SIGNIFICANT CHANGE UP (ref 2.1–4.9)
PLATELET # BLD AUTO: 326 K/UL — SIGNIFICANT CHANGE UP (ref 130–400)
PMV BLD: 9.8 FL — SIGNIFICANT CHANGE UP (ref 7.4–10.4)
POTASSIUM SERPL-MCNC: 3.3 MMOL/L — LOW (ref 3.5–5)
POTASSIUM SERPL-SCNC: 3.3 MMOL/L — LOW (ref 3.5–5)
PROT SERPL-MCNC: 5.6 G/DL — LOW (ref 6–8)
RBC # BLD: 2.88 M/UL — LOW (ref 4.2–5.4)
RBC # FLD: 14.9 % — HIGH (ref 11.5–14.5)
SODIUM SERPL-SCNC: 138 MMOL/L — SIGNIFICANT CHANGE UP (ref 135–146)
VANCOMYCIN TROUGH SERPL-MCNC: 11.4 UG/ML — HIGH (ref 5–10)
WBC # BLD: 17.99 K/UL — HIGH (ref 4.8–10.8)
WBC # FLD AUTO: 17.99 K/UL — HIGH (ref 4.8–10.8)

## 2024-07-06 PROCEDURE — 99233 SBSQ HOSP IP/OBS HIGH 50: CPT

## 2024-07-06 PROCEDURE — 71045 X-RAY EXAM CHEST 1 VIEW: CPT | Mod: 26

## 2024-07-06 RX ORDER — POTASSIUM CHLORIDE 600 MG/1
20 TABLET, FILM COATED, EXTENDED RELEASE ORAL ONCE
Refills: 0 | Status: COMPLETED | OUTPATIENT
Start: 2024-07-06 | End: 2024-07-06

## 2024-07-06 RX ORDER — SODIUM CHLORIDE 0.9 % (FLUSH) 0.9 %
500 SYRINGE (ML) INJECTION ONCE
Refills: 0 | Status: COMPLETED | OUTPATIENT
Start: 2024-07-06 | End: 2024-07-06

## 2024-07-06 RX ADMIN — HEPARIN SODIUM 5000 UNIT(S): 50 INJECTION, SOLUTION INTRAVENOUS at 13:20

## 2024-07-06 RX ADMIN — Medication 100 MILLIGRAM(S): at 18:29

## 2024-07-06 RX ADMIN — DOXYCYCLINE 100 MILLIGRAM(S): 100 CAPSULE ORAL at 17:25

## 2024-07-06 RX ADMIN — HEPARIN SODIUM 5000 UNIT(S): 50 INJECTION, SOLUTION INTRAVENOUS at 05:23

## 2024-07-06 RX ADMIN — POTASSIUM CHLORIDE 20 MILLIEQUIVALENT(S): 600 TABLET, FILM COATED, EXTENDED RELEASE ORAL at 12:12

## 2024-07-06 RX ADMIN — Medication 1 APPLICATION(S): at 05:24

## 2024-07-06 RX ADMIN — VANCOMYCIN HYDROCHLORIDE 250 MILLIGRAM(S): KIT at 17:24

## 2024-07-06 RX ADMIN — NOREPINEPHRINE BITARTRATE 5.26 MICROGRAM(S)/KG/MIN: 1 INJECTION INTRAVENOUS at 12:12

## 2024-07-06 RX ADMIN — NOREPINEPHRINE BITARTRATE 5.26 MICROGRAM(S)/KG/MIN: 1 INJECTION INTRAVENOUS at 20:37

## 2024-07-06 RX ADMIN — HEPARIN SODIUM 5000 UNIT(S): 50 INJECTION, SOLUTION INTRAVENOUS at 22:35

## 2024-07-06 RX ADMIN — Medication 25 MICROGRAM(S): at 05:23

## 2024-07-06 RX ADMIN — DOXYCYCLINE 100 MILLIGRAM(S): 100 CAPSULE ORAL at 05:23

## 2024-07-06 RX ADMIN — Medication 500 MILLILITER(S): at 06:53

## 2024-07-06 NOTE — PROGRESS NOTE ADULT - ASSESSMENT
Patient on low dose pressors. Receiving fluid. Give fluid cautiously  with AS, Continue fluid. Watch forchf. Correct K.. Pressors for now. Check cultures

## 2024-07-06 NOTE — PROGRESS NOTE ADULT - SUBJECTIVE AND OBJECTIVE BOX
Patient is a 89y old  Female who presents with a chief complaint of sepsis POA, pneumonia, hypokalemia (05 Jul 2024 14:44)        HPI:  This is a 89-year-old female, past medical history of aortic stenosis, HTN, HLD, and anxiety brought in by daughter for weakness and difficulty ambulating for the last 4 days.  Up until 4 days ago, patient has been ambulating with a walker.  Patient's daughter also reports patient has decreased appetite and difficulty swallowing,  Patient states she just has no energy. Patient's daughter reports patient fell once while ambulating, but denies any head trauma or LOC. Patient denies fever/chills, chest pain/shortness of breath, abdominal pain, nausea/vomiting/diarrhea, urinary symptoms.   (04 Jul 2024 18:41)      Pt evaluated on rounds.  I reviewed the radiology tests and hospital record.    I reviewed previous notes on this patient.    Interval Events: No overnight events.      REVIEW OF SYSTEMS:   see HPI      OBJECTIVE:  ICU Vital Signs Last 24 Hrs  T(C): 37.1 (06 Jul 2024 05:00), Max: 37.6 (05 Jul 2024 17:30)  T(F): 98.8 (06 Jul 2024 05:00), Max: 99.6 (05 Jul 2024 17:30)  HR: 74 (06 Jul 2024 05:00) (70 - 86)  BP: 110/53 (06 Jul 2024 05:00) (94/46 - 136/78)  BP(mean): 77 (06 Jul 2024 05:00) (67 - 98)    RR: 20 (06 Jul 2024 05:00) (18 - 47)  SpO2: 93% (06 Jul 2024 05:00) (90% - 100%)    O2 Parameters below as of 06 Jul 2024 05:00  Patient On (Oxygen Delivery Method): room air              07-04 @ 07:01  -  07-05 @ 07:00  --------------------------------------------------------  IN: 0 mL / OUT: 50 mL / NET: -50 mL    07-05 @ 07:01  -  07-06 @ 05:41  --------------------------------------------------------  IN: 855 mL / OUT: 40 mL / NET: 815 mL      CAPILLARY BLOOD GLUCOSE      POCT Blood Glucose.: 147 mg/dL (05 Jul 2024 07:48)        PHYSICAL EXAM:       · ENMT:   Airway patent,   Nasal mucosa clear.  Mouth with normal mucosa.   No thrush    · EYES:   Clear bilaterally,   pupils equal,   round and reactive to light.    · CARDIAC:   Normal rate,   regular rhythm.    Heart sounds S1, S2.   No murmurs, no rubs or gallops on auscultation  no edema        CAROTID:   normal systolic impulse  no bruits    · RESPIRATORY:   rales R  normal chest expansion  no retractions or use of accessory muscles  percussion of chest demonstrates no hyperresonance or dullness    · GASTROINTESTINAL:  Abdomen soft,   non-tender,   + BS  liver/spleen not palpable    · MUSCULOSKELETAL:   no clubbing, cyanosis      · SKIN:   Skin normal color for race,   warm, dry   No evidence of rash.        · HEME LYMPH:   no splenomegaly.  No cervical  lymphadenopathy.  no inguinal lymphadenopathy    HOSPITAL MEDICATIONS:  MEDICATIONS  (STANDING):  cefTRIAXone   IVPB 1000 milliGRAM(s) IV Intermittent every 24 hours  dextrose 50% Injectable 12.5 Gram(s) IV Push once  doxycycline IVPB 100 milliGRAM(s) IV Intermittent every 12 hours  heparin   Injectable 5000 Unit(s) SubCutaneous every 8 hours  levothyroxine Injectable 25 MICROGram(s) IV Push <User Schedule>  midodrine. 5 milliGRAM(s) Oral once  norepinephrine Infusion 0.05 MICROgram(s)/kG/Min (5.26 mL/Hr) IV Continuous <Continuous>  sodium chloride 0.9% Bolus 250 milliLiter(s) IV Bolus once  vancomycin  IVPB 1000 milliGRAM(s) IV Intermittent every 24 hours    MEDICATIONS  (PRN):  acetaminophen  Suppository .. 650 milliGRAM(s) Rectal every 6 hours PRN Temp greater or equal to 38C (100.4F)  ondansetron Injectable 4 milliGRAM(s) IV Push every 8 hours PRN Nausea and/or Vomiting    sodium chloride 0.9% Bolus:   250 milliLiter(s), IV Bolus, once, infuse over 1 Hr, Stop After 1 Doses  Special Instructions: for total of 750 ml over 1 hour  Provider's Contact #: [phone]  sodium chloride 0.9% Bolus:   500 milliLiter(s), IV Bolus, once, infuse over 60 Minute(s), Stop After 1 Doses  lactated ringers.: Solution, 1000 milliLiter(s) infuse at 100 mL/Hr  Provider's Contact #: [phone]  lactated ringers.: Solution, 1000 milliLiter(s) infuse at 75 mL/Hr  lactated ringers Bolus:   2000 milliLiter(s), IV Bolus, once, infuse over 60 Minute(s), Stop After 1 Doses  Special Instructions: Re-assess every 15 minutes, consult with provider for volumes GREATER THAN 2000 milliLiter(s) per 60 minute(s). Record 2 BP values within ONE hour of completion of bolus administration.      LABS:                        8.4    23.61 )-----------( 306      ( 05 Jul 2024 12:26 )             25.2     07-05    134<L>  |  96<L>  |  44<H>  ----------------------------<  136<H>  3.5   |  23  |  1.8<H>    Ca    9.1      05 Jul 2024 21:33  Phos  3.7     07-05  Mg     1.2     07-05    TPro  5.9<L>  /  Alb  3.1<L>  /  TBili  1.3<H>  /  DBili  x   /  AST  154<H>  /  ALT  76<H>  /  AlkPhos  119<H>  07-05    PT/INR - ( 04 Jul 2024 16:20 )   PT: 18.30 sec;   INR: 1.60 ratio         PTT - ( 04 Jul 2024 16:20 )  PTT:29.9 sec  Urinalysis Basic - ( 05 Jul 2024 21:33 )    Color: x / Appearance: x / SG: x / pH: x  Gluc: 136 mg/dL / Ketone: x  / Bili: x / Urobili: x   Blood: x / Protein: x / Nitrite: x   Leuk Esterase: x / RBC: x / WBC x   Sq Epi: x / Non Sq Epi: x / Bacteria: x        Venous Blood Gas:  07-04 @ 16:55  7.48/41/22/30/27.2  VBG Lactate: 3.1                    RADIOLOGY: Today I personally interpreted the latest CXR and other pertinent films.

## 2024-07-06 NOTE — SWALLOW BEDSIDE ASSESSMENT ADULT - NS SPL SWALLOW CLINIC TRIAL FT
+ toleration observed without overt symptoms of penetration/aspiration for puree, w thin liquids   HOWEVER daughter reports pt coughs on thin liquids at home   monitor for s/s of aspiration and penetration  needed reminders to swallow + toleration observed without overt symptoms of penetration/aspiration for puree, w thin liquids   HOWEVER daughter reports pt coughs on thin liquids at home   no further po trials 2/2 episode of regurgitation this AM   monitor for s/s of aspiration and penetration  needed reminders to swallow

## 2024-07-06 NOTE — PROGRESS NOTE ADULT - SUBJECTIVE AND OBJECTIVE BOX
89-year-old female, past medical history of aortic stenosis, HTN, HLD, and anxiety brought in by daughter for weakness and difficulty ambulating for the last 4 days.  Up until 4 days ago, patient has been ambulating with a walker.  Patient's daughter also reports patient has decreased appetite and difficulty swallowing,  Patient states she just has no energy. Patient's daughter reports patient fell once while ambulating, but denies any head trauma or LOC. Patient denies fever/chills, chest pain/shortness of breath, abdominal pain, nausea/vomiting/diarrhea, urinary symptoms.    Interval: transferred to ICU    PAST MEDICAL & SURGICAL HISTORY:    HTN (hypertension)  Hypothyroid  Flaco's disease  Aortic stenosis  Anemia  Hip fracture  s/p right ORIF    Social History:    non smoker    MEDICATIONS  (STANDING):  cefTRIAXone   IVPB 1000 milliGRAM(s) IV Intermittent every 24 hours  dextrose 50% Injectable 12.5 Gram(s) IV Push once  doxycycline IVPB 100 milliGRAM(s) IV Intermittent every 12 hours  heparin   Injectable 5000 Unit(s) SubCutaneous every 8 hours  levothyroxine Injectable 25 MICROGram(s) IV Push <User Schedule>  midodrine. 5 milliGRAM(s) Oral once  norepinephrine Infusion 0.05 MICROgram(s)/kG/Min (5.26 mL/Hr) IV Continuous <Continuous>  vancomycin  IVPB 1000 milliGRAM(s) IV Intermittent every 24 hours    MEDICATIONS  (PRN):  acetaminophen  Suppository .. 650 milliGRAM(s) Rectal every 6 hours PRN Temp greater or equal to 38C (100.4F)  ondansetron Injectable 4 milliGRAM(s) IV Push every 8 hours PRN Nausea and/or Vomiting      Allergies    sulfonamides (Unknown)  penicillins (Unknown)    Intolerances    ICU Vital Signs Last 24 Hrs  T(C): 37.1 (06 Jul 2024 05:00), Max: 37.6 (05 Jul 2024 17:30)  T(F): 98.8 (06 Jul 2024 05:00), Max: 99.6 (05 Jul 2024 17:30)  HR: 76 (06 Jul 2024 06:00) (70 - 86)  BP: 102/51 (06 Jul 2024 06:00) (94/46 - 136/78)  BP(mean): 73 (06 Jul 2024 06:00) (67 - 98)  ABP: --  ABP(mean): --  RR: 22 (06 Jul 2024 06:00) (18 - 47)  SpO2: 91% (06 Jul 2024 06:00) (90% - 100%)    O2 Parameters below as of 06 Jul 2024 06:00  Patient On (Oxygen Delivery Method): room air        PHYSICAL EXAM:  GENERAL: Elderly female, NAD  HEAD:  Atraumatic, Normocephalic  EYES: EOMI, PERRLA, conjunctiva and sclera clear  ENMT: Dry mucous membranes  CHEST/LUNG: Bilateral breath sounds, decreased RLL; No rales, rhonchi or wheezing  HEART: S1,S2 Regular rate and rhythm; No murmurs, rubs, or gallops  ABDOMEN: Soft, nontender, nondistended, +BS  EXTREMITIES: No calf tenderness bilaterally or peripheral edema   NEUROLOGY: non-focal, BUNCH x4, alert &oriented x3    LAB                          8.4    23.61 )-----------( 306      ( 05 Jul 2024 12:26 )             25.2                           8.9    17.12 )-----------( 300      ( 04 Jul 2024 16:20 )             28.0     07-05    134<L>  |  96<L>  |  44<H>  ----------------------------<  136<H>  3.5   |  23  |  1.8<H>    Ca    9.1      05 Jul 2024 21:33  Phos  3.7     07-05  Mg     1.2     07-05    TPro  5.9<L>  /  Alb  3.1<L>  /  TBili  1.3<H>  /  DBili  x   /  AST  154<H>  /  ALT  76<H>  /  AlkPhos  119<H>  07-05      07-04    138  |  96<L>  |  37<H>  ----------------------------<  141<H>  2.6<LL>   |  26  |  1.4    Ca    9.6      04 Jul 2024 16:20    TPro  6.6  /  Alb  3.4<L>  /  TBili  1.2  /  DBili  x   /  AST  23  /  ALT  12  /  AlkPhos  112  07-04    RAD    ACC: 1935 EXAM:  XR PELVIS AP ONLY 1-2 VIEWS   ORDERED BY: SAUMYA JORDAN     PROCEDURE DATE:  07/04/2024          INTERPRETATION:  CLINICAL HISTORY / REASON FOR EXAM: Pain status post fall    COMPARISON: Pelvis radiograph from December 16, 2018    TECHNIQUE: One view, AP pelvis radiograph    FINDINGS:    Status post left cephalomedullary nail. No acute displaced fracture.   Bilateral hip and lumbar spine degenerative changes. Osteopenia. Surgical   clips overlie the right lower pelvis.      IMPRESSION:    No acute displaced fracture.    --- End of Report ---            KACI STACY MD; Attending Radiologist  This document has been electronically signed. Jul 4 2024  8:53PM

## 2024-07-06 NOTE — SWALLOW BEDSIDE ASSESSMENT ADULT - SWALLOW EVAL: DIAGNOSIS
mild oral dysphagia + toleration observed without overt symptoms of penetration/aspiration for puree w/ thin liquids

## 2024-07-06 NOTE — PROGRESS NOTE ADULT - SUBJECTIVE AND OBJECTIVE BOX
Patient is a 89y old  Female who presents with a chief complaint of sepsis POA, pneumonia, hypokalemia (06 Jul 2024 07:02)      T(F): 97.3 (07-06-24 @ 07:05), Max: 99.6 (07-05-24 @ 17:30)  HR: 76 (07-06-24 @ 08:00)  BP: 104/52 (07-06-24 @ 08:00)  RR: 30 (07-06-24 @ 08:00)  SpO2: 93% (07-06-24 @ 08:00) (91% - 100%)    PHYSICAL EXAM:  GENERAL: NAD, well-groomed, well-developed  HEAD:  Atraumatic, Normocephalic  EYES: EOMI, PERRLA, conjunctiva and sclera clear  ENMT: No tonsillar erythema, exudates, or enlargement; Moist mucous membranes, Good dentition, No lesions  NECK: Supple, No JVD, Normal thyroid  NERVOUS SYSTEM:  Alert & Oriented X3,  Motor Strength 5/5 B/L upper and lower extremities  CHEST/LUNG: Clear to percussion bilaterally; No rales, rhonchi, wheezing, or rubs  HEART: Regular rate and rhythmii/visem lsb, rubs, or gallops  ABDOMEN: Soft, Nontender, Nondistended; Bowel sounds present  EXTREMITIES:   No clubbing, cyanosis, or edema  LYMPH: No lymphadenopathy noted  SKIN: No rashes or lesions    labs  07-06    138  |  99  |  48<H>  ----------------------------<  118<H>  3.3<L>   |  22  |  1.8<H>    Ca    9.9      06 Jul 2024 06:05  Phos  3.3     07-06  Mg     2.9     07-06    TPro  5.6<L>  /  Alb  2.8<L>  /  TBili  1.0  /  DBili  x   /  AST  81<H>  /  ALT  67<H>  /  AlkPhos  122<H>  07-06                          8.0    17.99 )-----------( 326      ( 06 Jul 2024 06:05 )             24.7       Urinalysis with Rflx Culture (collected 04 Jul 2024 17:55)      PT/INR - ( 04 Jul 2024 16:20 )   PT: 18.30 sec;   INR: 1.60 ratio         PTT - ( 04 Jul 2024 16:20 )  PTT:29.9 sec        acetaminophen  Suppository .. 650 milliGRAM(s) Rectal every 6 hours PRN  cefTRIAXone   IVPB 1000 milliGRAM(s) IV Intermittent every 24 hours  dextrose 50% Injectable 12.5 Gram(s) IV Push once  doxycycline IVPB 100 milliGRAM(s) IV Intermittent every 12 hours  heparin   Injectable 5000 Unit(s) SubCutaneous every 8 hours  levothyroxine Injectable 25 MICROGram(s) IV Push <User Schedule>  midodrine. 5 milliGRAM(s) Oral once  norepinephrine Infusion 0.05 MICROgram(s)/kG/Min IV Continuous <Continuous>  ondansetron Injectable 4 milliGRAM(s) IV Push every 8 hours PRN  vancomycin  IVPB 1000 milliGRAM(s) IV Intermittent every 24 hours

## 2024-07-06 NOTE — PROGRESS NOTE ADULT - ASSESSMENT
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  Feeding per speech    RENAL: monitor lytes, follow K, trend lactate, f/u labs    INFECTIOUS DISEASE: f/u pan cx, MRSA/RVP/ Urine legionaires/strep (-), UA noted,   abx per ID    HEMATOLOGICAL:  DVT prophylaxis.    ENDOCRINE:  Follow up FS.  Insulin protocol if needed, c/w home synthroid    MUSCULOSKELETAL: OOBTC, OT/PT    SDU

## 2024-07-06 NOTE — PHYSICAL THERAPY INITIAL EVALUATION ADULT - SPECIFY REASON(S)
Attempted to see pt this PM for Bedside  PT , YE Mars Notified to defer PT at this time 2/2 PT on Levophed and Medically not stable for PT at this time ,Will f/u as appropriated.

## 2024-07-07 LAB
ANION GAP SERPL CALC-SCNC: 12 MMOL/L — SIGNIFICANT CHANGE UP (ref 7–14)
BASOPHILS # BLD AUTO: 0.05 K/UL — SIGNIFICANT CHANGE UP (ref 0–0.2)
BASOPHILS NFR BLD AUTO: 0.4 % — SIGNIFICANT CHANGE UP (ref 0–1)
BUN SERPL-MCNC: 52 MG/DL — HIGH (ref 10–20)
CALCIUM SERPL-MCNC: 9.2 MG/DL — SIGNIFICANT CHANGE UP (ref 8.4–10.5)
CHLORIDE SERPL-SCNC: 99 MMOL/L — SIGNIFICANT CHANGE UP (ref 98–110)
CO2 SERPL-SCNC: 22 MMOL/L — SIGNIFICANT CHANGE UP (ref 17–32)
CREAT SERPL-MCNC: 1.7 MG/DL — HIGH (ref 0.7–1.5)
EGFR: 28 ML/MIN/1.73M2 — LOW
EOSINOPHIL # BLD AUTO: 0.06 K/UL — SIGNIFICANT CHANGE UP (ref 0–0.7)
EOSINOPHIL NFR BLD AUTO: 0.5 % — SIGNIFICANT CHANGE UP (ref 0–8)
GLUCOSE SERPL-MCNC: 102 MG/DL — HIGH (ref 70–99)
HCT VFR BLD CALC: 23.2 % — LOW (ref 37–47)
HGB BLD-MCNC: 7.3 G/DL — LOW (ref 12–16)
IMM GRANULOCYTES NFR BLD AUTO: 0.9 % — HIGH (ref 0.1–0.3)
LYMPHOCYTES # BLD AUTO: 0.91 K/UL — LOW (ref 1.2–3.4)
LYMPHOCYTES # BLD AUTO: 7.6 % — LOW (ref 20.5–51.1)
MCHC RBC-ENTMCNC: 27.5 PG — SIGNIFICANT CHANGE UP (ref 27–31)
MCHC RBC-ENTMCNC: 31.5 G/DL — LOW (ref 32–37)
MCV RBC AUTO: 87.5 FL — SIGNIFICANT CHANGE UP (ref 81–99)
MONOCYTES # BLD AUTO: 1.22 K/UL — HIGH (ref 0.1–0.6)
MONOCYTES NFR BLD AUTO: 10.2 % — HIGH (ref 1.7–9.3)
NEUTROPHILS # BLD AUTO: 9.66 K/UL — HIGH (ref 1.4–6.5)
NEUTROPHILS NFR BLD AUTO: 80.4 % — HIGH (ref 42.2–75.2)
NRBC # BLD: 0 /100 WBCS — SIGNIFICANT CHANGE UP (ref 0–0)
PLATELET # BLD AUTO: 294 K/UL — SIGNIFICANT CHANGE UP (ref 130–400)
PMV BLD: 9.8 FL — SIGNIFICANT CHANGE UP (ref 7.4–10.4)
POTASSIUM SERPL-MCNC: 3.7 MMOL/L — SIGNIFICANT CHANGE UP (ref 3.5–5)
POTASSIUM SERPL-SCNC: 3.7 MMOL/L — SIGNIFICANT CHANGE UP (ref 3.5–5)
RBC # BLD: 2.65 M/UL — LOW (ref 4.2–5.4)
RBC # FLD: 15 % — HIGH (ref 11.5–14.5)
SODIUM SERPL-SCNC: 133 MMOL/L — LOW (ref 135–146)
WBC # BLD: 12.01 K/UL — HIGH (ref 4.8–10.8)
WBC # FLD AUTO: 12.01 K/UL — HIGH (ref 4.8–10.8)

## 2024-07-07 PROCEDURE — 99233 SBSQ HOSP IP/OBS HIGH 50: CPT

## 2024-07-07 PROCEDURE — 99232 SBSQ HOSP IP/OBS MODERATE 35: CPT

## 2024-07-07 RX ORDER — MIDODRINE HYDROCHLORIDE 10 MG/1
5 TABLET ORAL EVERY 8 HOURS
Refills: 0 | Status: DISCONTINUED | OUTPATIENT
Start: 2024-07-07 | End: 2024-07-20

## 2024-07-07 RX ORDER — ETHACRYNIC ACID 25 MG/1
25 TABLET ORAL ONCE
Refills: 0 | Status: COMPLETED | OUTPATIENT
Start: 2024-07-07 | End: 2024-07-07

## 2024-07-07 RX ORDER — POTASSIUM CHLORIDE 600 MG/1
40 TABLET, FILM COATED, EXTENDED RELEASE ORAL ONCE
Refills: 0 | Status: COMPLETED | OUTPATIENT
Start: 2024-07-07 | End: 2024-07-07

## 2024-07-07 RX ADMIN — Medication 100 MILLIGRAM(S): at 18:12

## 2024-07-07 RX ADMIN — ETHACRYNIC ACID 25 MILLIGRAM(S): 25 TABLET ORAL at 08:53

## 2024-07-07 RX ADMIN — HEPARIN SODIUM 5000 UNIT(S): 50 INJECTION, SOLUTION INTRAVENOUS at 06:29

## 2024-07-07 RX ADMIN — NOREPINEPHRINE BITARTRATE 5.26 MICROGRAM(S)/KG/MIN: 1 INJECTION INTRAVENOUS at 06:33

## 2024-07-07 RX ADMIN — POTASSIUM CHLORIDE 40 MILLIEQUIVALENT(S): 600 TABLET, FILM COATED, EXTENDED RELEASE ORAL at 10:46

## 2024-07-07 RX ADMIN — MIDODRINE HYDROCHLORIDE 5 MILLIGRAM(S): 10 TABLET ORAL at 21:56

## 2024-07-07 RX ADMIN — VANCOMYCIN HYDROCHLORIDE 250 MILLIGRAM(S): KIT at 18:11

## 2024-07-07 RX ADMIN — Medication 5 MILLIGRAM(S): at 21:55

## 2024-07-07 RX ADMIN — HEPARIN SODIUM 5000 UNIT(S): 50 INJECTION, SOLUTION INTRAVENOUS at 21:55

## 2024-07-07 RX ADMIN — MIDODRINE HYDROCHLORIDE 5 MILLIGRAM(S): 10 TABLET ORAL at 13:15

## 2024-07-07 RX ADMIN — DOXYCYCLINE 100 MILLIGRAM(S): 100 CAPSULE ORAL at 06:30

## 2024-07-07 RX ADMIN — Medication 25 MICROGRAM(S): at 06:32

## 2024-07-07 RX ADMIN — HEPARIN SODIUM 5000 UNIT(S): 50 INJECTION, SOLUTION INTRAVENOUS at 16:13

## 2024-07-07 RX ADMIN — DOXYCYCLINE 100 MILLIGRAM(S): 100 CAPSULE ORAL at 17:08

## 2024-07-07 NOTE — PROGRESS NOTE ADULT - SUBJECTIVE AND OBJECTIVE BOX
89-year-old female, past medical history of aortic stenosis, HTN, HLD, and anxiety brought in by daughter for weakness and difficulty ambulating for the last 4 days.  Up until 4 days ago, patient has been ambulating with a walker.  Patient's daughter also reports patient has decreased appetite and difficulty swallowing,  Patient states she just has no energy. Patient's daughter reports patient fell once while ambulating, but denies any head trauma or LOC. Patient denies fever/chills, chest pain/shortness of breath, abdominal pain, nausea/vomiting/diarrhea, urinary symptoms.    Interval: transferred to ICU    PAST MEDICAL & SURGICAL HISTORY:    HTN (hypertension)  Hypothyroid  Flaco's disease  Aortic stenosis  Anemia  Hip fracture  s/p right ORIF    Social History:    non smoker    MEDICATIONS  (STANDING):  cefTRIAXone   IVPB 1000 milliGRAM(s) IV Intermittent every 24 hours  dextrose 50% Injectable 12.5 Gram(s) IV Push once  doxycycline IVPB 100 milliGRAM(s) IV Intermittent every 12 hours  heparin   Injectable 5000 Unit(s) SubCutaneous every 8 hours  levothyroxine Injectable 25 MICROGram(s) IV Push <User Schedule>  midodrine. 5 milliGRAM(s) Oral once  norepinephrine Infusion 0.05 MICROgram(s)/kG/Min (5.26 mL/Hr) IV Continuous <Continuous>  vancomycin  IVPB 1000 milliGRAM(s) IV Intermittent every 24 hours    MEDICATIONS  (PRN):  acetaminophen  Suppository .. 650 milliGRAM(s) Rectal every 6 hours PRN Temp greater or equal to 38C (100.4F)  ondansetron Injectable 4 milliGRAM(s) IV Push every 8 hours PRN Nausea and/or Vomiting      Allergies    sulfonamides (Unknown)  penicillins (Unknown)    Intolerances    ICU Vital Signs Last 24 Hrs  T(C): 36.1 (07 Jul 2024 03:00), Max: 36.7 (06 Jul 2024 23:00)  T(F): 96.9 (07 Jul 2024 03:00), Max: 98.1 (06 Jul 2024 23:00)  HR: 80 (07 Jul 2024 05:00) (72 - 92)  BP: 88/51 (07 Jul 2024 05:00) (88/51 - 112/58)  BP(mean): 67 (07 Jul 2024 05:00) (66 - 80)  ABP: --  ABP(mean): --  RR: 24 (07 Jul 2024 05:00) (24 - 32)  SpO2: 94% (07 Jul 2024 05:00) (87% - 97%)    O2 Parameters below as of 07 Jul 2024 05:00  Patient On (Oxygen Delivery Method): nasal cannula  O2 Flow (L/min): 2    Diet, Pureed (07-06-24 @ 10:08) [Active]      PHYSICAL EXAM:  GENERAL: Elderly female, NAD  HEAD:  Atraumatic, Normocephalic  EYES: EOMI, PERRLA, conjunctiva and sclera clear  ENMT: Dry mucous membranes  CHEST/LUNG: Bilateral breath sounds, decreased RLL; No rales, rhonchi or wheezing  HEART: S1,S2 Regular rate and rhythm; No murmurs, rubs, or gallops  ABDOMEN: Soft, nontender, nondistended, +BS  EXTREMITIES: No calf tenderness bilaterally or peripheral edema   NEUROLOGY: non-focal, BUNCH x4, alert &oriented x3    LAB                          8.0    17.99 )-----------( 326      ( 06 Jul 2024 06:05 )             24.7                           8.4    23.61 )-----------( 306      ( 05 Jul 2024 12:26 )             25.2                           8.9    17.12 )-----------( 300      ( 04 Jul 2024 16:20 )             28.0     07-06    138  |  99  |  48<H>  ----------------------------<  118<H>  3.3<L>   |  22  |  1.8<H>    Ca    9.9      06 Jul 2024 06:05  Phos  3.3     07-06  Mg     2.9     07-06    TPro  5.6<L>  /  Alb  2.8<L>  /  TBili  1.0  /  DBili  x   /  AST  81<H>  /  ALT  67<H>  /  AlkPhos  122<H>  07-06      07-05    134<L>  |  96<L>  |  44<H>  ----------------------------<  136<H>  3.5   |  23  |  1.8<H>    Ca    9.1      05 Jul 2024 21:33  Phos  3.7     07-05  Mg     1.2     07-05    TPro  5.9<L>  /  Alb  3.1<L>  /  TBili  1.3<H>  /  DBili  x   /  AST  154<H>  /  ALT  76<H>  /  AlkPhos  119<H>  07-05 07-04    138  |  96<L>  |  37<H>  ----------------------------<  141<H>  2.6<LL>   |  26  |  1.4    Ca    9.6      04 Jul 2024 16:20    TPro  6.6  /  Alb  3.4<L>  /  TBili  1.2  /  DBili  x   /  AST  23  /  ALT  12  /  AlkPhos  112  07-04    RAD    ACC: [id number] EXAM:  XR PELVIS AP ONLY 1-2 VIEWS   ORDERED BY: SAUMYA JORDAN     PROCEDURE DATE:  07/04/2024          INTERPRETATION:  CLINICAL HISTORY / REASON FOR EXAM: Pain status post fall    COMPARISON: Pelvis radiograph from December 16, 2018    TECHNIQUE: One view, AP pelvis radiograph    FINDINGS:    Status post left cephalomedullary nail. No acute displaced fracture.   Bilateral hip and lumbar spine degenerative changes. Osteopenia. Surgical   clips overlie the right lower pelvis.      IMPRESSION:    No acute displaced fracture.    --- End of Report ---            KACI STACY MD; Attending Radiologist  This document has been electronically signed. Jul 4 2024  8:53PM

## 2024-07-07 NOTE — PROGRESS NOTE ADULT - ASSESSMENT
89-year-old female, past medical history of aortic stenosis, HTN, HLD, and anxiety brought in by daughter for weakness and difficulty ambulating for the last 4 days.  Up until 4 days ago, patient has been ambulating with a walker.  Patient's daughter also reports patient has decreased appetite and difficulty swallowing,  Patient states she just has no energy. Patient's daughter reports patient fell once while ambulating, but denies any head trauma or LOC. Patient denies fever/chills, chest pain/shortness of breath, abdominal pain, nausea/vomiting/diarrhea, urinary symptoms.      #Sepsis POA 2/2 pneumonia     -C/w cefepime and vancomycin  -C/w IVF    -ID consult   -F/U procalcitonin, Urine Strep/Legionella, MRSA PCR, sputum culture, blood cultures    -F/U repeat lactate   -Monitor for fevers and leukocytosis  -Pain control and antipyretics prn   -Supplemental O2 prn   -Monitor I&O    #Elevated troponin  -Patient denies CP.   -EKG: Sinus rhythm with PAC, LVH,    -C/w Telemetry monitoring   -F/U cardiology consult    -Continue to trend     #Hypokalemia  -F/U repeat labs and replete prn     #Aortic stenosis   -F/U echo  -Echo 2018: normal LV function, G1DD, mild MR/AR, severe AS     #Hypothyroidism  -Synthroid 50 mcg      #HTN/HLD  -holding statin and HCTZ - resume once cleared by speech and swallow     #Anemia (Baseline Hgb 9-11)  -Continue to monitor     #Diet:    #DVT ppx: HSQ   #Activity: OOB with assistance   #ADVANCE CARE CPR  #DISPOSITION:  ACUTE

## 2024-07-07 NOTE — PROGRESS NOTE ADULT - SUBJECTIVE AND OBJECTIVE BOX
Patient is a 89y old  Female who presents with a chief complaint of sepsis POA, pneumonia, hypokalemia (07 Jul 2024 07:19)      T(F): 96.9 (07-07-24 @ 03:00), Max: 98.1 (07-06-24 @ 23:00)  HR: 80 (07-07-24 @ 05:00)  BP: 88/51 (07-07-24 @ 05:00)  RR: 24 (07-07-24 @ 05:00)  SpO2: 94% (07-07-24 @ 05:00) (87% - 97%)    PHYSICAL EXAM:  GENERAL: NAD, well-groomed, well-developed  HEAD:  Atraumatic, Normocephalic  EYES: EOMI, PERRLA, conjunctiva and sclera clear  ENMT: No tonsillar erythema, exudates, or enlargement; Moist mucous membranes, Good dentition, No lesions  NECK: Supple, No JVD, Normal thyroid  NERVOUS SYSTEM:  Alert & Oriented X3,  Motor Strength 5/5 B/L upper and lower extremities  CHEST/LUNG: Clear to percussion bilaterally; No rales, rhonchi, wheezing, or rubs  HEART: Regular rate and rhythm; No murmurs, rubs, or gallops  ABDOMEN: Soft, Nontender, Nondistended; Bowel sounds present  EXTREMITIES:   No clubbing, cyanosis, or edema  LYMPH: No lymphadenopathy noted  SKIN: No rashes or lesions    labs  07-06    138  |  99  |  48<H>  ----------------------------<  118<H>  3.3<L>   |  22  |  1.8<H>    Ca    9.9      06 Jul 2024 06:05  Phos  3.3     07-06  Mg     2.9     07-06    TPro  5.6<L>  /  Alb  2.8<L>  /  TBili  1.0  /  DBili  x   /  AST  81<H>  /  ALT  67<H>  /  AlkPhos  122<H>  07-06                          8.0    17.99 )-----------( 326      ( 06 Jul 2024 06:05 )             24.7       Urinalysis with Rflx Culture (collected 04 Jul 2024 17:55)    Culture - Blood (collected 04 Jul 2024 16:20)  Source: .Blood Blood-Peripheral  Preliminary Report (06 Jul 2024 17:02):    No growth at 24 hours    Culture - Blood (collected 04 Jul 2024 16:20)  Source: .Blood Blood-Peripheral  Preliminary Report (06 Jul 2024 17:02):    No growth at 24 hours              acetaminophen  Suppository .. 650 milliGRAM(s) Rectal every 6 hours PRN  cefTRIAXone   IVPB 1000 milliGRAM(s) IV Intermittent every 24 hours  dextrose 50% Injectable 12.5 Gram(s) IV Push once  doxycycline IVPB 100 milliGRAM(s) IV Intermittent every 12 hours  heparin   Injectable 5000 Unit(s) SubCutaneous every 8 hours  levothyroxine Injectable 25 MICROGram(s) IV Push <User Schedule>  midodrine. 5 milliGRAM(s) Oral once  norepinephrine Infusion 0.05 MICROgram(s)/kG/Min IV Continuous <Continuous>  ondansetron Injectable 4 milliGRAM(s) IV Push every 8 hours PRN  vancomycin  IVPB 1000 milliGRAM(s) IV Intermittent every 24 hours

## 2024-07-07 NOTE — PROGRESS NOTE ADULT - SUBJECTIVE AND OBJECTIVE BOX
Patient is a 89y old  Female who presents with a chief complaint of sepsis POA, pneumonia, hypokalemia (06 Jul 2024 09:21)        HPI:  This is a 89-year-old female, past medical history of aortic stenosis, HTN, HLD, and anxiety brought in by daughter for weakness and difficulty ambulating for the last 4 days.  Up until 4 days ago, patient has been ambulating with a walker.  Patient's daughter also reports patient has decreased appetite and difficulty swallowing,  Patient states she just has no energy. Patient's daughter reports patient fell once while ambulating, but denies any head trauma or LOC. Patient denies fever/chills, chest pain/shortness of breath, abdominal pain, nausea/vomiting/diarrhea, urinary symptoms.   (04 Jul 2024 18:41)      Pt evaluated on rounds.  I reviewed the radiology tests and hospital record.    I reviewed previous notes on this patient.    Interval Events: No overnight events.      REVIEW OF SYSTEMS:   see HPI      OBJECTIVE:  ICU Vital Signs Last 24 Hrs  T(C): 36.1 (07 Jul 2024 03:00), Max: 36.7 (06 Jul 2024 23:00)  T(F): 96.9 (07 Jul 2024 03:00), Max: 98.1 (06 Jul 2024 23:00)  HR: 80 (07 Jul 2024 01:00) (72 - 92)  BP: 102/53 (07 Jul 2024 01:00) (89/50 - 112/58)  BP(mean): 76 (07 Jul 2024 01:00) (66 - 80)  RR: 24 (07 Jul 2024 03:00) (22 - 32)  SpO2: 97% (07 Jul 2024 03:00) (87% - 97%)    O2 Parameters below as of 07 Jul 2024 01:00  Patient On (Oxygen Delivery Method): nasal cannula  O2 Flow (L/min): 2      07-05 @ 07:01  -  07-06 @ 07:00  --------------------------------------------------------  IN: 1365 mL / OUT: 100 mL / NET: 1265 mL    07-06 @ 07:01  -  07-07 @ 05:19  --------------------------------------------------------  IN: 755 mL / OUT: 210 mL / NET: 545 mL      CAPILLARY BLOOD GLUCOSE      POCT Blood Glucose.: 147 mg/dL (05 Jul 2024 07:48)        PHYSICAL EXAM:       · ENMT:   Airway patent,   Nasal mucosa clear.  Mouth with normal mucosa.   No thrush    · EYES:   Clear bilaterally,   pupils equal,   round and reactive to light.    · CARDIAC:   Normal rate,   regular rhythm.    Heart sounds S1, S2.   No murmurs, no rubs or gallops on auscultation  no edema        CAROTID:   normal systolic impulse  no bruits    · RESPIRATORY:   rales R>L  normal chest expansion  no retractions or use of accessory muscles  percussion of chest demonstrates no hyperresonance or dullness    · GASTROINTESTINAL:  Abdomen soft,   non-tender,   + BS  liver/spleen not palpable    · MUSCULOSKELETAL:   no clubbing, cyanosis      · SKIN:   Skin normal color for race,   warm, dry   No evidence of rash.        · HEME LYMPH:   no splenomegaly.  No cervical  lymphadenopathy.  no inguinal lymphadenopathy    HOSPITAL MEDICATIONS:  MEDICATIONS  (STANDING):  cefTRIAXone   IVPB 1000 milliGRAM(s) IV Intermittent every 24 hours  dextrose 50% Injectable 12.5 Gram(s) IV Push once  doxycycline IVPB 100 milliGRAM(s) IV Intermittent every 12 hours  heparin   Injectable 5000 Unit(s) SubCutaneous every 8 hours  levothyroxine Injectable 25 MICROGram(s) IV Push <User Schedule>  midodrine. 5 milliGRAM(s) Oral once  norepinephrine Infusion 0.05 MICROgram(s)/kG/Min (5.26 mL/Hr) IV Continuous <Continuous>  vancomycin  IVPB 1000 milliGRAM(s) IV Intermittent every 24 hours    MEDICATIONS  (PRN):  acetaminophen  Suppository .. 650 milliGRAM(s) Rectal every 6 hours PRN Temp greater or equal to 38C (100.4F)  ondansetron Injectable 4 milliGRAM(s) IV Push every 8 hours PRN Nausea and/or Vomiting    sodium chloride 0.9% Bolus:   500 milliLiter(s), IV Bolus, once, infuse over 60 Minute(s), Stop After 1 Doses  sodium chloride 0.9% Bolus:   250 milliLiter(s), IV Bolus, once, infuse over 1 Hr, Stop After 1 Doses  Special Instructions: for total of 750 ml over 1 hour  Provider's Contact #: [phone]  sodium chloride 0.9% Bolus:   500 milliLiter(s), IV Bolus, once, infuse over 60 Minute(s), Stop After 1 Doses  lactated ringers.: Solution, 1000 milliLiter(s) infuse at 100 mL/Hr  Provider's Contact #: [phone]  lactated ringers.: Solution, 1000 milliLiter(s) infuse at 75 mL/Hr  lactated ringers Bolus:   2000 milliLiter(s), IV Bolus, once, infuse over 60 Minute(s), Stop After 1 Doses  Special Instructions: Re-assess every 15 minutes, consult with provider for volumes GREATER THAN 2000 milliLiter(s) per 60 minute(s). Record 2 BP values within ONE hour of completion of bolus administration.      LABS:                        8.0    17.99 )-----------( 326      ( 06 Jul 2024 06:05 )             24.7     07-06    138  |  99  |  48<H>  ----------------------------<  118<H>  3.3<L>   |  22  |  1.8<H>    Ca    9.9      06 Jul 2024 06:05  Phos  3.3     07-06  Mg     2.9     07-06    TPro  5.6<L>  /  Alb  2.8<L>  /  TBili  1.0  /  DBili  x   /  AST  81<H>  /  ALT  67<H>  /  AlkPhos  122<H>  07-06      Urinalysis Basic - ( 06 Jul 2024 06:05 )    Color: x / Appearance: x / SG: x / pH: x  Gluc: 118 mg/dL / Ketone: x  / Bili: x / Urobili: x   Blood: x / Protein: x / Nitrite: x   Leuk Esterase: x / RBC: x / WBC x   Sq Epi: x / Non Sq Epi: x / Bacteria: x            RADIOLOGY: Today I personally interpreted the latest CXR and other pertinent films.

## 2024-07-07 NOTE — PHYSICAL THERAPY INITIAL EVALUATION ADULT - GENERAL OBSERVATIONS, REHAB EVAL
15:00 -15:30 Chart reviewed. Order received.  Patient is ok to be  seen for PT,confirmed with RN. pt encountered  Semi cardenas in bed denies pain, and agrees to participate in session, +  Heplock , + O2 2 LPM via NC / Tele / Pulse ox / BP Cuff , + Primafit ,NAD.  Daughter at bedside

## 2024-07-07 NOTE — PROGRESS NOTE ADULT - ASSESSMENT
Patient on low dose pressors.   Watch for chf. Correct K.. Pressors for now. Check cultures Will try midodrine. Try wean pressors. When able oob to chair

## 2024-07-07 NOTE — PHYSICAL THERAPY INITIAL EVALUATION ADULT - PERTINENT HX OF CURRENT PROBLEM, REHAB EVAL
89-year-old female, past medical history of aortic stenosis, HTN, HLD, and anxiety brought in by daughter for weakness and difficulty ambulating for the last 4 days.  Up until 4 days ago, patient has been ambulating with a walker.  Patient's daughter also reports patient has decreased appetite and difficulty swallowing,  Patient states she just has no energy. Patient's daughter reports patient fell once while ambulating, but denies any head trauma or LOC. Patient denies fever/chills, chest pain/shortness of breath, abdominal pain, nausea/vomiting/diarrhea, urinary symptoms.

## 2024-07-07 NOTE — PROVIDER CONTACT NOTE (OTHER) - SITUATION
patient given 2 tablets crushed for HS meds in applesauce. pureed diet as per SLP. patient then aspirated on applesauce and water. patient threw up small amounts of yellow emesis.

## 2024-07-07 NOTE — PHYSICAL THERAPY INITIAL EVALUATION ADULT - ADDITIONAL COMMENTS
pt is 89 y/o F  , lives alone in  , information obtain from the Daughter at bedside  , has  3 steps to enter with BL  HR and all in one level , pt was independent using RW with bed mobility , transfer , ambulation , Need Assistance W/  Showering and Independent W / ADLS. Pt has HHA for 3x / Week .

## 2024-07-07 NOTE — PROGRESS NOTE ADULT - ASSESSMENT
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  Feeding per speech    RENAL: monitor lytes, follow K, trend lactate, f/u labs    INFECTIOUS DISEASE: f/u pan cx, MRSA/RVP/ Urine legionaires/strep (-), UA noted,   abx per ID    HEMATOLOGICAL:  DVT prophylaxis.    ENDOCRINE:  Follow up FS.  Insulin protocol if needed, c/w home synthroid    MUSCULOSKELETAL: OOBTC, OT/PT    SDU

## 2024-07-08 LAB
ALBUMIN SERPL ELPH-MCNC: 2.7 G/DL — LOW (ref 3.5–5.2)
ALP SERPL-CCNC: 101 U/L — SIGNIFICANT CHANGE UP (ref 30–115)
ALT FLD-CCNC: 52 U/L — HIGH (ref 0–41)
ANION GAP SERPL CALC-SCNC: 12 MMOL/L — SIGNIFICANT CHANGE UP (ref 7–14)
AST SERPL-CCNC: 43 U/L — HIGH (ref 0–41)
BASOPHILS # BLD AUTO: 0.06 K/UL — SIGNIFICANT CHANGE UP (ref 0–0.2)
BASOPHILS NFR BLD AUTO: 0.5 % — SIGNIFICANT CHANGE UP (ref 0–1)
BILIRUB SERPL-MCNC: 0.6 MG/DL — SIGNIFICANT CHANGE UP (ref 0.2–1.2)
BUN SERPL-MCNC: 49 MG/DL — HIGH (ref 10–20)
CALCIUM SERPL-MCNC: 9.5 MG/DL — SIGNIFICANT CHANGE UP (ref 8.4–10.5)
CHLORIDE SERPL-SCNC: 98 MMOL/L — SIGNIFICANT CHANGE UP (ref 98–110)
CO2 SERPL-SCNC: 23 MMOL/L — SIGNIFICANT CHANGE UP (ref 17–32)
CREAT SERPL-MCNC: 1.5 MG/DL — SIGNIFICANT CHANGE UP (ref 0.7–1.5)
D DIMER BLD IA.RAPID-MCNC: 532 NG/ML DDU — HIGH
EGFR: 32 ML/MIN/1.73M2 — LOW
EOSINOPHIL # BLD AUTO: 0.22 K/UL — SIGNIFICANT CHANGE UP (ref 0–0.7)
EOSINOPHIL NFR BLD AUTO: 1.8 % — SIGNIFICANT CHANGE UP (ref 0–8)
GLUCOSE SERPL-MCNC: 98 MG/DL — SIGNIFICANT CHANGE UP (ref 70–99)
HCT VFR BLD CALC: 22.3 % — LOW (ref 37–47)
HGB BLD-MCNC: 7.2 G/DL — LOW (ref 12–16)
IMM GRANULOCYTES NFR BLD AUTO: 0.9 % — HIGH (ref 0.1–0.3)
LYMPHOCYTES # BLD AUTO: 0.99 K/UL — LOW (ref 1.2–3.4)
LYMPHOCYTES # BLD AUTO: 8.1 % — LOW (ref 20.5–51.1)
MAGNESIUM SERPL-MCNC: 2 MG/DL — SIGNIFICANT CHANGE UP (ref 1.8–2.4)
MCHC RBC-ENTMCNC: 27.7 PG — SIGNIFICANT CHANGE UP (ref 27–31)
MCHC RBC-ENTMCNC: 32.3 G/DL — SIGNIFICANT CHANGE UP (ref 32–37)
MCV RBC AUTO: 85.8 FL — SIGNIFICANT CHANGE UP (ref 81–99)
MONOCYTES # BLD AUTO: 1.45 K/UL — HIGH (ref 0.1–0.6)
MONOCYTES NFR BLD AUTO: 11.8 % — HIGH (ref 1.7–9.3)
NEUTROPHILS # BLD AUTO: 9.43 K/UL — HIGH (ref 1.4–6.5)
NEUTROPHILS NFR BLD AUTO: 76.9 % — HIGH (ref 42.2–75.2)
NRBC # BLD: 0 /100 WBCS — SIGNIFICANT CHANGE UP (ref 0–0)
PLATELET # BLD AUTO: 282 K/UL — SIGNIFICANT CHANGE UP (ref 130–400)
PMV BLD: 9.8 FL — SIGNIFICANT CHANGE UP (ref 7.4–10.4)
POTASSIUM SERPL-MCNC: 4 MMOL/L — SIGNIFICANT CHANGE UP (ref 3.5–5)
POTASSIUM SERPL-SCNC: 4 MMOL/L — SIGNIFICANT CHANGE UP (ref 3.5–5)
PROT SERPL-MCNC: 5 G/DL — LOW (ref 6–8)
RBC # BLD: 2.6 M/UL — LOW (ref 4.2–5.4)
RBC # FLD: 15.1 % — HIGH (ref 11.5–14.5)
SODIUM SERPL-SCNC: 133 MMOL/L — LOW (ref 135–146)
WBC # BLD: 12.26 K/UL — HIGH (ref 4.8–10.8)
WBC # FLD AUTO: 12.26 K/UL — HIGH (ref 4.8–10.8)

## 2024-07-08 PROCEDURE — 99233 SBSQ HOSP IP/OBS HIGH 50: CPT

## 2024-07-08 PROCEDURE — 93970 EXTREMITY STUDY: CPT | Mod: 26

## 2024-07-08 RX ORDER — CEFPODOXIME PROXETIL 50 MG/5 ML
200 SUSPENSION, RECONSTITUTED, ORAL (ML) ORAL EVERY 12 HOURS
Refills: 0 | Status: DISCONTINUED | OUTPATIENT
Start: 2024-07-08 | End: 2024-07-12

## 2024-07-08 RX ORDER — CITALOPRAM 10 MG/1
20 TABLET, FILM COATED ORAL DAILY
Refills: 0 | Status: DISCONTINUED | OUTPATIENT
Start: 2024-07-08 | End: 2024-07-20

## 2024-07-08 RX ORDER — DOXYCYCLINE 100 MG/1
100 CAPSULE ORAL EVERY 12 HOURS
Refills: 0 | Status: DISCONTINUED | OUTPATIENT
Start: 2024-07-08 | End: 2024-07-12

## 2024-07-08 RX ADMIN — HEPARIN SODIUM 5000 UNIT(S): 50 INJECTION, SOLUTION INTRAVENOUS at 21:14

## 2024-07-08 RX ADMIN — DOXYCYCLINE 100 MILLIGRAM(S): 100 CAPSULE ORAL at 06:02

## 2024-07-08 RX ADMIN — DOXYCYCLINE 100 MILLIGRAM(S): 100 CAPSULE ORAL at 17:37

## 2024-07-08 RX ADMIN — HEPARIN SODIUM 5000 UNIT(S): 50 INJECTION, SOLUTION INTRAVENOUS at 06:03

## 2024-07-08 RX ADMIN — MIDODRINE HYDROCHLORIDE 5 MILLIGRAM(S): 10 TABLET ORAL at 14:10

## 2024-07-08 RX ADMIN — Medication 1 APPLICATION(S): at 17:44

## 2024-07-08 RX ADMIN — MIDODRINE HYDROCHLORIDE 5 MILLIGRAM(S): 10 TABLET ORAL at 21:14

## 2024-07-08 RX ADMIN — Medication 25 MICROGRAM(S): at 06:03

## 2024-07-08 RX ADMIN — HEPARIN SODIUM 5000 UNIT(S): 50 INJECTION, SOLUTION INTRAVENOUS at 14:10

## 2024-07-08 RX ADMIN — CITALOPRAM 20 MILLIGRAM(S): 10 TABLET, FILM COATED ORAL at 17:37

## 2024-07-08 RX ADMIN — Medication 200 MILLIGRAM(S): at 17:37

## 2024-07-08 NOTE — PROGRESS NOTE ADULT - ASSESSMENT
This is a 89-year-old female, past medical history of aortic stenosis, HTN, HLD, and anxiety brought in by daughter for weakness and difficulty ambulating for the last 4 days.    IMPRESSION  #Acute hypoxic respiratory failure. Concern for Community acquired pneumonia  #Shock- Likely cardiogenic, Severe aortic stenosis. Heart failure with reduced EF  #MASSIMO over CKD- US kidney bladder normal.   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  MRSA Nares negative    RECOMMENDATIONS  -Blood cultures negative.   -Consider obtaining RUQ US.   -For now keep on IV ceftriaxone 1 gram q 24 hours and doxycycline 100mg BID PO/IV. (S/D of abx 7/4/24)  -D/C Vancomycin.   -Taper oxygen supplementation when able.   -Off loading to prevent pressure sores and preventive measures to avoid aspiration.   -Guarded prognosis.   -Patient will benefit from PCV 20 and RSV vaccines in 1-2 months.     If any questions, please send a message or call on [facility] Teams  Please continue to update ID with any pertinent new laboratory or radiographic findings.    Demario Gill M.D  Infectious Diseases Attending/   [employer] at [facility] This is a 89-year-old female, past medical history of aortic stenosis, HTN, HLD, and anxiety brought in by daughter for weakness and difficulty ambulating for the last 4 days.    IMPRESSION  #Acute hypoxic respiratory failure. Concern for Community acquired pneumonia  #Shock- Likely cardiogenic, Severe aortic stenosis. Heart failure with reduced EF  #MASSIMO over CKD- US kidney bladder normal.   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  MRSA Nares negative    RECOMMENDATIONS  -Blood cultures negative.   -Consider obtaining RUQ US.   -For now keep on IV ceftriaxone 1 gram q 24 hours and doxycycline 100mg BID PO/IV. (S/D of abx 7/4/24)  -When closer to discharge, switch to PO vantin 200mg BID and Doxy 100mg BID for total of 7 days.   -D/C Vancomycin.   -Taper oxygen supplementation when able.   -Off loading to prevent pressure sores and preventive measures to avoid aspiration.   -Guarded prognosis.   -Patient will benefit from PCV 20 and RSV vaccines in 1-2 months.     If any questions, please send a message or call on [employer] Teams  Please continue to update ID with any pertinent new laboratory or radiographic findings.    Demario Gill M.D  Infectious Diseases Attending/   [employer] at [facility]

## 2024-07-08 NOTE — SWALLOW BEDSIDE ASSESSMENT ADULT - COMMENTS
here with pneumonia + toleration observed without overt symptoms of penetration/aspiration for puree w mildly thick liquids

## 2024-07-08 NOTE — PROGRESS NOTE ADULT - SUBJECTIVE AND OBJECTIVE BOX
ARIAS PUGA  89y, Female    LOS  4d    INTERVAL EVENTS/HPI  - T(F): , Max: 97.2 (07-07-24 @ 15:00)  - WBC Count: 12.26 (07-08-24 @ 05:58)  WBC Count: 12.01 (07-07-24 @ 06:28)  - Creatinine: 1.5 (07-08-24 @ 05:58)  Creatinine: 1.7 (07-07-24 @ 06:28)    REVIEW OF SYSTEMS:  CONSTITUTIONAL: No fever or chills  HEENT: No sore throat  RESPIRATORY: No cough, no shortness of breath  CARDIOVASCULAR: No chest pain or palpitations  GASTROINTESTINAL: No abdominal or epigastric pain  GENITOURINARY: No dysuria  NEUROLOGICAL: No headache/dizziness  MSK: No joint pain, erythema, or swelling; no back pain  SKIN: No itching, rashes  All other ROS negative except noted above    Prior hospital charts reviewed [Yes]  Primary team notes reviewed [Yes]  Other consultant notes reviewed [Yes]    ANTIMICROBIALS:   cefTRIAXone   IVPB 1000 every 24 hours  doxycycline IVPB 100 every 12 hours  vancomycin  IVPB 1000 every 24 hours    OTHER MEDS: MEDICATIONS  (STANDING):  acetaminophen  Suppository .. 650 every 6 hours PRN  dextrose 50% Injectable 12.5 once  heparin   Injectable 5000 every 8 hours  levothyroxine Injectable 25 <User Schedule>  melatonin 5 at bedtime  midodrine 5 every 8 hours  norepinephrine Infusion 0.05 <Continuous>  ondansetron Injectable 4 every 8 hours PRN      Vital Signs Last 24 Hrs  T(F): 97 (07-08-24 @ 07:01), Max: 100.6 (07-04-24 @ 16:05)    Vital Signs Last 24 Hrs  HR: 69 (07-08-24 @ 07:00) (66 - 85)  BP: 108/55 (07-08-24 @ 07:00) (73/51 - 122/55)  RR: 24 (07-08-24 @ 07:01)  SpO2: 100% (07-08-24 @ 07:00) (93% - 100%)  Wt(kg): --    EXAM:  GENERAL: NAD, Frail looking.   HEAD: No head lesions  NECK: Supple  CHEST/LUNG: Shallow breath sounds.   HEART: S1 S2  ABDOMEN: Soft, nontender  EXTREMITIES: No clubbing  NERVOUS SYSTEM: Alert and oriented to person  MSK: No joint erythema, swelling or pain  SKIN: No rashes or lesions, no superficial thrombophlebitis    Labs:                        7.2    12.26 )-----------( 282      ( 08 Jul 2024 05:58 )             22.3     07-08    133<L>  |  98  |  49<H>  ----------------------------<  98  4.0   |  23  |  1.5    Ca    9.5      08 Jul 2024 05:58  Mg     2.0     07-08    TPro  5.0<L>  /  Alb  2.7<L>  /  TBili  0.6  /  DBili  x   /  AST  43<H>  /  ALT  52<H>  /  AlkPhos  101  07-08      WBC Trend:  WBC Count: 12.26 (07-08-24 @ 05:58)  WBC Count: 12.01 (07-07-24 @ 06:28)  WBC Count: 17.99 (07-06-24 @ 06:05)  WBC Count: 23.61 (07-05-24 @ 12:26)      Creatine Trend:  Creatinine: 1.5 (07-08)  Creatinine: 1.7 (07-07)  Creatinine: 1.8 (07-06)  Creatinine: 1.8 (07-05)      Liver Biochemical Testing Trend:  Alanine Aminotransferase (ALT/SGPT): 52 *H* (07-08)  Alanine Aminotransferase (ALT/SGPT): 67 *H* (07-06)  Alanine Aminotransferase (ALT/SGPT): 76 *H* (07-05)  Alanine Aminotransferase (ALT/SGPT): 12 (07-04)  Aspartate Aminotransferase (AST/SGOT): 43 (07-08-24 @ 05:58)  Aspartate Aminotransferase (AST/SGOT): 81 (07-06-24 @ 06:05)  Aspartate Aminotransferase (AST/SGOT): 154 (07-05-24 @ 11:22)  Aspartate Aminotransferase (AST/SGOT): 23 (07-04-24 @ 16:20)  Bilirubin Total: 0.6 (07-08)  Bilirubin Total: 1.0 (07-06)  Bilirubin Total: 1.3 (07-05)  Bilirubin Total: 1.2 (07-04)      Trend LDH      Urinalysis Basic - ( 08 Jul 2024 05:58 )    Color: x / Appearance: x / SG: x / pH: x  Gluc: 98 mg/dL / Ketone: x  / Bili: x / Urobili: x   Blood: x / Protein: x / Nitrite: x   Leuk Esterase: x / RBC: x / WBC x   Sq Epi: x / Non Sq Epi: x / Bacteria: x        MICROBIOLOGY:  Vancomycin Level, Trough: 11.4 (07-06 @ 15:00)    Female    Urinalysis with Rflx Culture (collected 04 Jul 2024 17:55)    Culture - Blood (collected 04 Jul 2024 16:20)  Source: .Blood Blood-Peripheral  Preliminary Report:    No growth at 48 Hours    Culture - Blood (collected 04 Jul 2024 16:20)  Source: .Blood Blood-Peripheral  Preliminary Report:    No growth at 48 Hours    Troponin T, High Sensitivity Result: 120 (07-04)  Troponin T, High Sensitivity Result: 135 (07-04)    Lactate, Blood: 1.9 (07-05 @ 11:22)        RADIOLOGY & ADDITIONAL TESTS:  I have personally reviewed the relevant images.   CXR  Xray Chest 1 View AP/PA:   ACC: [id number] EXAM:  XR CHEST 1 VIEW   ORDERED BY: NIRAJ FELICIANO     PROCEDURE DATE:  07/06/2024          INTERPRETATION:  Reason for study : Shortness of breath  Technique:  Frontal view the chest      Comparison: Chest x-rayJuly 4, 2024    Findings/  impression:        Support devices: EKG leads overlie thorax, obscuring anatomy.    Cardiac/ Mediastinum: Stable    Lungs/ Pleura: Increasing right greater than left parenchymal   opacity/effusions.    Skeletal/ soft tissues: Stable degenerative changes        --- End of Report ---      DEVIN LEIGH MD; Attending Radiologist  This document has been electronically signed. Jul 6 2024  8:32AM (07-06-24 @ 07:14)      CT    < from: Xray Chest 1 View AP/PA (07.06.24 @ 07:14) >      Support devices: EKG leads overlie thorax, obscuring anatomy.    Cardiac/ Mediastinum: Stable    Lungs/ Pleura: Increasing right greater than left parenchymal   opacity/effusions.    Skeletal/ soft tissues: Stable degenerative changes    < end of copied text >      WEIGHT  Weight (kg): 56.1 (07-04-24 @ 19:48)  Creatinine: 1.5 mg/dL (07-08-24 @ 05:58)      All available historical records have been reviewed       ARIAS PUGA  89y, Female    LOS  4d    INTERVAL EVENTS/HPI  - T(F): , Max: 97.2 (07-07-24 @ 15:00)  - WBC Count: 12.26 (07-08-24 @ 05:58)  WBC Count: 12.01 (07-07-24 @ 06:28)  - Creatinine: 1.5 (07-08-24 @ 05:58)  Creatinine: 1.7 (07-07-24 @ 06:28)    REVIEW OF SYSTEMS:  CONSTITUTIONAL: No fever or chills  HEENT: No sore throat  RESPIRATORY: No cough, no shortness of breath  CARDIOVASCULAR: No chest pain or palpitations  GASTROINTESTINAL: No abdominal or epigastric pain  GENITOURINARY: No dysuria  NEUROLOGICAL: No headache/dizziness  MSK: No joint pain, erythema, or swelling; no back pain  SKIN: No itching, rashes  All other ROS negative except noted above    Prior hospital charts reviewed [Yes]  Primary team notes reviewed [Yes]  Other consultant notes reviewed [Yes]    ANTIMICROBIALS:   cefTRIAXone   IVPB 1000 every 24 hours  doxycycline IVPB 100 every 12 hours  vancomycin  IVPB 1000 every 24 hours    OTHER MEDS: MEDICATIONS  (STANDING):  acetaminophen  Suppository .. 650 every 6 hours PRN  dextrose 50% Injectable 12.5 once  heparin   Injectable 5000 every 8 hours  levothyroxine Injectable 25 <User Schedule>  melatonin 5 at bedtime  midodrine 5 every 8 hours  norepinephrine Infusion 0.05 <Continuous>  ondansetron Injectable 4 every 8 hours PRN      Vital Signs Last 24 Hrs  T(F): 97 (07-08-24 @ 07:01), Max: 100.6 (07-04-24 @ 16:05)    Vital Signs Last 24 Hrs  HR: 69 (07-08-24 @ 07:00) (66 - 85)  BP: 108/55 (07-08-24 @ 07:00) (73/51 - 122/55)  RR: 24 (07-08-24 @ 07:01)  SpO2: 100% (07-08-24 @ 07:00) (93% - 100%)  Wt(kg): --    EXAM:  GENERAL: NAD, Frail looking. on NC   HEAD: No head lesions  NECK: Supple  CHEST/LUNG: Shallow breath sounds.   HEART: S1 S2  ABDOMEN: Soft, nontender  EXTREMITIES: No clubbing  NERVOUS SYSTEM: Alert and oriented to person  MSK: No joint erythema, swelling or pain  SKIN: No rashes or lesions, no superficial thrombophlebitis    Labs:                        7.2    12.26 )-----------( 282      ( 08 Jul 2024 05:58 )             22.3     07-08    133<L>  |  98  |  49<H>  ----------------------------<  98  4.0   |  23  |  1.5    Ca    9.5      08 Jul 2024 05:58  Mg     2.0     07-08    TPro  5.0<L>  /  Alb  2.7<L>  /  TBili  0.6  /  DBili  x   /  AST  43<H>  /  ALT  52<H>  /  AlkPhos  101  07-08      WBC Trend:  WBC Count: 12.26 (07-08-24 @ 05:58)  WBC Count: 12.01 (07-07-24 @ 06:28)  WBC Count: 17.99 (07-06-24 @ 06:05)  WBC Count: 23.61 (07-05-24 @ 12:26)      Creatine Trend:  Creatinine: 1.5 (07-08)  Creatinine: 1.7 (07-07)  Creatinine: 1.8 (07-06)  Creatinine: 1.8 (07-05)      Liver Biochemical Testing Trend:  Alanine Aminotransferase (ALT/SGPT): 52 *H* (07-08)  Alanine Aminotransferase (ALT/SGPT): 67 *H* (07-06)  Alanine Aminotransferase (ALT/SGPT): 76 *H* (07-05)  Alanine Aminotransferase (ALT/SGPT): 12 (07-04)  Aspartate Aminotransferase (AST/SGOT): 43 (07-08-24 @ 05:58)  Aspartate Aminotransferase (AST/SGOT): 81 (07-06-24 @ 06:05)  Aspartate Aminotransferase (AST/SGOT): 154 (07-05-24 @ 11:22)  Aspartate Aminotransferase (AST/SGOT): 23 (07-04-24 @ 16:20)  Bilirubin Total: 0.6 (07-08)  Bilirubin Total: 1.0 (07-06)  Bilirubin Total: 1.3 (07-05)  Bilirubin Total: 1.2 (07-04)      Trend LDH      Urinalysis Basic - ( 08 Jul 2024 05:58 )    Color: x / Appearance: x / SG: x / pH: x  Gluc: 98 mg/dL / Ketone: x  / Bili: x / Urobili: x   Blood: x / Protein: x / Nitrite: x   Leuk Esterase: x / RBC: x / WBC x   Sq Epi: x / Non Sq Epi: x / Bacteria: x        MICROBIOLOGY:  Vancomycin Level, Trough: 11.4 (07-06 @ 15:00)    Female    Urinalysis with Rflx Culture (collected 04 Jul 2024 17:55)    Culture - Blood (collected 04 Jul 2024 16:20)  Source: .Blood Blood-Peripheral  Preliminary Report:    No growth at 48 Hours    Culture - Blood (collected 04 Jul 2024 16:20)  Source: .Blood Blood-Peripheral  Preliminary Report:    No growth at 48 Hours    Troponin T, High Sensitivity Result: 120 (07-04)  Troponin T, High Sensitivity Result: 135 (07-04)    Lactate, Blood: 1.9 (07-05 @ 11:22)        RADIOLOGY & ADDITIONAL TESTS:  I have personally reviewed the relevant images.   CXR  Xray Chest 1 View AP/PA:   ACC: [id number] EXAM:  XR CHEST 1 VIEW   ORDERED BY: NIRAJ FELICIANO     PROCEDURE DATE:  07/06/2024          INTERPRETATION:  Reason for study : Shortness of breath  Technique:  Frontal view the chest      Comparison: Chest x-rayJuly 4, 2024    Findings/  impression:        Support devices: EKG leads overlie thorax, obscuring anatomy.    Cardiac/ Mediastinum: Stable    Lungs/ Pleura: Increasing right greater than left parenchymal   opacity/effusions.    Skeletal/ soft tissues: Stable degenerative changes        --- End of Report ---      DEVIN LEIGH MD; Attending Radiologist  This document has been electronically signed. Jul 6 2024  8:32AM (07-06-24 @ 07:14)      CT    < from: Xray Chest 1 View AP/PA (07.06.24 @ 07:14) >      Support devices: EKG leads overlie thorax, obscuring anatomy.    Cardiac/ Mediastinum: Stable    Lungs/ Pleura: Increasing right greater than left parenchymal   opacity/effusions.    Skeletal/ soft tissues: Stable degenerative changes    < end of copied text >      WEIGHT  Weight (kg): 56.1 (07-04-24 @ 19:48)  Creatinine: 1.5 mg/dL (07-08-24 @ 05:58)      All available historical records have been reviewed

## 2024-07-08 NOTE — SWALLOW BEDSIDE ASSESSMENT ADULT - NS SPL SWALLOW CLINIC TRIAL FT
mild oral dysphagia for thin liquids, anterior loss of bolus, potentially due to weakness and increased lethargy. When she got more awake, oral prep improved

## 2024-07-08 NOTE — OCCUPATIONAL THERAPY INITIAL EVALUATION ADULT - PERTINENT HX OF CURRENT PROBLEM, REHAB EVAL
This is a 89-year-old female, past medical history of aortic stenosis, HTN, HLD, and anxiety brought in by daughter for weakness and difficulty ambulating for the last 4 days.  Up until 4 days ago, patient has been ambulating with a walker.  Patient's daughter also reports patient has decreased appetite and difficulty swallowing,  Patient states she just has no energy. Patient's daughter reports patient fell once while ambulating, but denies any head trauma or LOC. Patient denies fever/chills, chest pain/shortness of breath, abdominal pain, nausea/vomiting/diarrhea, urinary symptoms.      XRay  Hip: No acute displaced fracture.

## 2024-07-08 NOTE — PROGRESS NOTE ADULT - SUBJECTIVE AND OBJECTIVE BOX
Patient is a 89y old  Female who presents with a chief complaint of sepsis POA, pneumonia, hypokalemia (08 Jul 2024 08:21)    Over Night Events: patient is off pressors, on 2 L RA    ROS:     All ROS are negative except HPI       PHYSICAL EXAM    ICU Vital Signs Last 24 Hrs  T(C): 36.1 (08 Jul 2024 07:01), Max: 36.2 (07 Jul 2024 15:00)  T(F): 97 (08 Jul 2024 07:01), Max: 97.2 (07 Jul 2024 15:00)  HR: 69 (08 Jul 2024 07:00) (66 - 85)  BP: 108/55 (08 Jul 2024 07:00) (73/51 - 122/55)  BP(mean): 79 (08 Jul 2024 07:00) (58 - 79)  ABP: --  ABP(mean): --  RR: 24 (08 Jul 2024 07:01) (23 - 37)  SpO2: 100% (08 Jul 2024 07:00) (93% - 100%)    O2 Parameters below as of 08 Jul 2024 07:00  Patient On (Oxygen Delivery Method): nasal cannula  O2 Flow (L/min): 2      CONSTITUTIONAL:  NAD    CARDIAC:   Normal rate,   Regular rhythm.      RESPIRATORY:   No wheezing  Bilateral BS    GASTROINTESTINAL:  Abdomen soft,   Non-tender,   No guarding    MUSCULOSKELETAL:   Range of motion is not limited,  No clubbing, cyanosis    NEUROLOGICAL:   Alert and oriented    SKIN:   Skin normal color for race,   Warm and dry and intact.   No evidence of rash.    07-07-24 @ 07:01  -  07-08-24 @ 07:00  --------------------------------------------------------  IN:    IV PiggyBack: 400 mL  Total IN: 400 mL    OUT:    Norepinephrine: 0 mL    Voided (mL): 350 mL  Total OUT: 350 mL    Total NET: 50 mL      LABS:                    7.2    12.26 )-----------( 282      ( 08 Jul 2024 05:58 )             22.3                                               07-08    133<L>  |  98  |  49<H>  ----------------------------<  98  4.0   |  23  |  1.5    Ca    9.5      08 Jul 2024 05:58  Mg     2.0     07-08    TPro  5.0<L>  /  Alb  2.7<L>  /  TBili  0.6  /  DBili  x   /  AST  43<H>  /  ALT  52<H>  /  AlkPhos  101  07-08                                           Urinalysis Basic - ( 08 Jul 2024 05:58 )    Color: x / Appearance: x / SG: x / pH: x  Gluc: 98 mg/dL / Ketone: x  / Bili: x / Urobili: x   Blood: x / Protein: x / Nitrite: x   Leuk Esterase: x / RBC: x / WBC x   Sq Epi: x / Non Sq Epi: x / Bacteria: x                                                LIVER FUNCTIONS - ( 08 Jul 2024 05:58 )  Alb: 2.7 g/dL / Pro: 5.0 g/dL / ALK PHOS: 101 U/L / ALT: 52 U/L / AST: 43 U/L / GGT: x                                                                                                                                      MEDICATIONS  (STANDING):  cefTRIAXone   IVPB 1000 milliGRAM(s) IV Intermittent every 24 hours  dextrose 50% Injectable 12.5 Gram(s) IV Push once  doxycycline IVPB 100 milliGRAM(s) IV Intermittent every 12 hours  heparin   Injectable 5000 Unit(s) SubCutaneous every 8 hours  levothyroxine Injectable 25 MICROGram(s) IV Push <User Schedule>  melatonin 5 milliGRAM(s) Oral at bedtime  midodrine 5 milliGRAM(s) Oral every 8 hours  norepinephrine Infusion 0.05 MICROgram(s)/kG/Min (5.26 mL/Hr) IV Continuous <Continuous>    MEDICATIONS  (PRN):  acetaminophen  Suppository .. 650 milliGRAM(s) Rectal every 6 hours PRN Temp greater or equal to 38C (100.4F)  ondansetron Injectable 4 milliGRAM(s) IV Push every 8 hours PRN Nausea and/or Vomiting      New X-rays reviewed:                                                                                  ECHO

## 2024-07-08 NOTE — CHART NOTE - NSCHARTNOTEFT_GEN_A_CORE
ICU Transfer Note    Transfer from: ICU   Transfer to:Floor                            HPI:  This is a 89-year-old female, past medical history of aortic stenosis, HTN, HLD, and anxiety brought in by daughter for weakness and difficulty ambulating for the last 4 days.  Up until 4 days ago, patient has been ambulating with a walker.  Patient's daughter also reports patient has decreased appetite and difficulty swallowing,  Patient states she just has no energy. Patient's daughter reports patient fell once while ambulating, but denies any head trauma or LOC. Patient denies fever/chills, chest pain/shortness of breath, abdominal pain, nausea/vomiting/diarrhea, urinary symptoms.       Patient admitted to the ICU for Sepsis 2/2 pna now requiring low does pressors    ICU COURSE:  Pt upgraded from the floor for soft blood pressure now requiring low dose pressors. Family still adiment about being full code even after lengthy discussion about in the event of a arrest, given her servere AS, it would be almost futile. ABX were tapered from broad spectrum to Rofecin and doxy given the likelyhood of aspiration pna. Negative lactate since the 5th. Pt off pressers for 24 hours now and started on 5mg midodrine q8 for BP. Pt sating well on 2lts NC.        Patient is clinically and hemodynamically stable for  transfer.    ASSESSMENT AND PLAN:    #Sepsis POA 2/2 pneumonia     - cefepime and vancomycin DC  -CW Doxycycline   -C/w IVF    -ID consult appreciated    -F/U procalcitonin, Urine Strep/Legionella, MRSA PCR, sputum culture, blood cultures    -Neg repeat lactate   -Monitor for fevers and leukocytosis  -Pain control and antipyretics prn   -Supplemental O2 prn   -Monitor I&O    #Elevated troponin  -Patient denies CP.   -EKG: Sinus rhythm with PAC, LVH,    -cardiology consult appreciated    -Peaked, like type II     #Hypokalemia  -F/U repeat labs and replete prn     #Aortic stenosis   Echo 7/5: 1. Moderately to severely decreased global left ventricular systolic   function.   2. LV Ejection Fraction by Maria's Method with a biplane EF of 30 %.    -Echo 2018: normal LV function, G1DD, mild MR/AR, severe AS     #Hypothyroidism  -Synthroid 50 mcg      #HTN/HLD  -holding statin and HCTZ - resume once cleared by speech and swallow     #Anemia (Baseline Hgb 9-11)  -Continue to monitor     #Diet:    #DVT ppx: HSQ   #Activity: OOB with assistance   #ADVANCE CARE CPR  #DISPOSITION:  ACUTE          # Misc    - Diet: Pure  - Activity: bed to chair   - Code Status: Full code  -Dispo: Floor    PENDINGS:  GOC conversation  possible rehab placement  aspiration precautions      ICU Vital Signs Last 24 Hrs  T(C): 36.2 (08 Jul 2024 11:00), Max: 36.2 (07 Jul 2024 15:00)  T(F): 97.2 (08 Jul 2024 11:00), Max: 97.2 (07 Jul 2024 15:00)  HR: 64 (08 Jul 2024 09:00) (64 - 85)  BP: 92/53 (08 Jul 2024 08:00) (73/51 - 122/55)  BP(mean): 69 (08 Jul 2024 08:00) (58 - 79)  ABP: --  ABP(mean): --  RR: 20 (08 Jul 2024 11:00) (20 - 35)  SpO2: 100% (08 Jul 2024 09:00) (95% - 100%)    O2 Parameters below as of 08 Jul 2024 07:00  Patient On (Oxygen Delivery Method): nasal cannula  O2 Flow (L/min): 2        I&O's Summary    07 Jul 2024 07:01  -  08 Jul 2024 07:00  --------------------------------------------------------  IN: 400 mL / OUT: 350 mL / NET: 50 mL          LABS:                        7.2    12.26 )-----------( 282      ( 08 Jul 2024 05:58 )             22.3     07-08    133<L>  |  98  |  49<H>  ----------------------------<  98  4.0   |  23  |  1.5    Ca    9.5      08 Jul 2024 05:58  Mg     2.0     07-08    TPro  5.0<L>  /  Alb  2.7<L>  /  TBili  0.6  /  DBili  x   /  AST  43<H>  /  ALT  52<H>  /  AlkPhos  101  07-08      Urinalysis Basic - ( 08 Jul 2024 05:58 )    Color: x / Appearance: x / SG: x / pH: x  Gluc: 98 mg/dL / Ketone: x  / Bili: x / Urobili: x   Blood: x / Protein: x / Nitrite: x   Leuk Esterase: x / RBC: x / WBC x   Sq Epi: x / Non Sq Epi: x / Bacteria: x      CAPILLARY BLOOD GLUCOSE            RADIOLOGY & ADDITIONAL TESTS:    MEDICATIONS  (STANDING):  cefTRIAXone   IVPB 1000 milliGRAM(s) IV Intermittent every 24 hours  chlorhexidine 2% Cloths 1 Application(s) Topical every 12 hours  citalopram 20 milliGRAM(s) Oral daily  dextrose 50% Injectable 12.5 Gram(s) IV Push once  doxycycline IVPB 100 milliGRAM(s) IV Intermittent every 12 hours  heparin   Injectable 5000 Unit(s) SubCutaneous every 8 hours  levothyroxine Injectable 25 MICROGram(s) IV Push <User Schedule>  melatonin 5 milliGRAM(s) Oral at bedtime  midodrine 5 milliGRAM(s) Oral every 8 hours  norepinephrine Infusion 0.05 MICROgram(s)/kG/Min (5.26 mL/Hr) IV Continuous <Continuous>    MEDICATIONS  (PRN):  acetaminophen  Suppository .. 650 milliGRAM(s) Rectal every 6 hours PRN Temp greater or equal to 38C (100.4F)  ondansetron Injectable 4 milliGRAM(s) IV Push every 8 hours PRN Nausea and/or Vomiting

## 2024-07-08 NOTE — OCCUPATIONAL THERAPY INITIAL EVALUATION ADULT - GENERAL OBSERVATIONS, REHAB EVAL
13:35-13:58 Chart reviewed, ok to treat by Occupational Therapist as confirmed by RN Taylor, Pt received in bed in chair position (+) tele (+) O2 3L via NC (+) prima fit with SLP Anitra present in NAD. Pt in agreement with OT IE.

## 2024-07-08 NOTE — OCCUPATIONAL THERAPY INITIAL EVALUATION ADULT - BED MOBILITY LIMITATIONS, REHAB EVAL
impaired postural control/decreased ROM/decreased strength decreased ability to use arms for pushing/pulling/decreased ability to use legs for bridging/pushing/impaired ability to control trunk for mobility

## 2024-07-08 NOTE — OCCUPATIONAL THERAPY INITIAL EVALUATION ADULT - MANUAL MUSCLE TESTING RESULTS, REHAB EVAL
difficulty following commands; RUE 3/5 Left shoulder 0/5 elbow wrist and hand 2-/5/grossly assessed due to

## 2024-07-08 NOTE — OCCUPATIONAL THERAPY INITIAL EVALUATION ADULT - RANGE OF MOTION EXAMINATION
RUE A/PROM WFLs; Left shoulder 0 volitional movement; elbow 1/4 range; wrist 1/4 range, hand 1/2 range. PROM LUE shoulder 0/80 elbow wrist WFLs hand within limits of arthritic changes/deficits as listed below

## 2024-07-08 NOTE — OCCUPATIONAL THERAPY INITIAL EVALUATION ADULT - SPECIFY REASON(S)
Chart Reviewed. As discussed with YE Vazquez, defer OT IE at this time 2/2 patient lethargic and unable to participate. OT to f/u when appropriate.

## 2024-07-08 NOTE — PROGRESS NOTE ADULT - ASSESSMENT
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  Feeding per speech    RENAL: monitor lytes, follow K, trend lactate, f/u labs    INFECTIOUS DISEASE: bcx (-), MRSA/RVP/ Urine legionaires/strep (-), UA noted  abx per ID    HEMATOLOGICAL:  DVT prophylaxis. ddimer, duplex    ENDOCRINE:  Follow up FS.  Insulin protocol if needed, c/w home synthroid    MUSCULOSKELETAL: OOBTC, OT/PT    DGTF

## 2024-07-08 NOTE — PROGRESS NOTE ADULT - ATTENDING COMMENTS
Attending Statement: I have personally performed a face to face diagnostic evaluation on this patient. 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 I have personally seen and examined this patient.  I have fully participated in the care of this patient.  I have reviewed all pertinent clinical information, including history, physical exam, plan and note.

## 2024-07-08 NOTE — SWALLOW BEDSIDE ASSESSMENT ADULT - SWALLOW EVAL: DIAGNOSIS
mild oral dysphagia for thin liquids (potentially 2/2 to weakness) + toleration observed without overt symptoms of penetration/aspiration for puree w/ mildly thick liquids

## 2024-07-08 NOTE — PROGRESS NOTE ADULT - SUBJECTIVE AND OBJECTIVE BOX
89-year-old female, past medical history of aortic stenosis, HTN, HLD, and anxiety brought in by daughter for weakness and difficulty ambulating for the last 4 days.  Up until 4 days ago, patient has been ambulating with a walker.  Patient's daughter also reports patient has decreased appetite and difficulty swallowing,  Patient states she just has no energy. Patient's daughter reports patient fell once while ambulating, but denies any head trauma or LOC. Patient denies fever/chills, chest pain/shortness of breath, abdominal pain, nausea/vomiting/diarrhea, urinary symptoms.    Interval: transferred to ICU    PAST MEDICAL & SURGICAL HISTORY:    HTN (hypertension)  Hypothyroid  Flaco's disease  Aortic stenosis  Anemia  Hip fracture  s/p right ORIF    Social History:    non smoker    MEDICATIONS  (STANDING):  cefTRIAXone   IVPB 1000 milliGRAM(s) IV Intermittent every 24 hours  dextrose 50% Injectable 12.5 Gram(s) IV Push once  doxycycline IVPB 100 milliGRAM(s) IV Intermittent every 12 hours  heparin   Injectable 5000 Unit(s) SubCutaneous every 8 hours  levothyroxine Injectable 25 MICROGram(s) IV Push <User Schedule>  midodrine. 5 milliGRAM(s) Oral once  norepinephrine Infusion 0.05 MICROgram(s)/kG/Min (5.26 mL/Hr) IV Continuous <Continuous>  vancomycin  IVPB 1000 milliGRAM(s) IV Intermittent every 24 hours    MEDICATIONS  (PRN):  acetaminophen  Suppository .. 650 milliGRAM(s) Rectal every 6 hours PRN Temp greater or equal to 38C (100.4F)  ondansetron Injectable 4 milliGRAM(s) IV Push every 8 hours PRN Nausea and/or Vomiting      Allergies    sulfonamides (Unknown)  penicillins (Unknown)    Intolerances    ICU Vital Signs Last 24 Hrs  T(C): 35.8 (07 Jul 2024 23:08), Max: 36.2 (07 Jul 2024 15:00)  T(F): 96.4 (07 Jul 2024 23:08), Max: 97.2 (07 Jul 2024 15:00)  HR: 66 (08 Jul 2024 04:00) (66 - 85)  BP: 93/46 (08 Jul 2024 04:00) (73/51 - 122/55)  BP(mean): 66 (08 Jul 2024 04:00) (58 - 79)  ABP: --  ABP(mean): --  RR: 23 (08 Jul 2024 04:00) (23 - 41)  SpO2: 100% (08 Jul 2024 04:00) (93% - 100%)    O2 Parameters below as of 08 Jul 2024 04:00  Patient On (Oxygen Delivery Method): nasal cannula  O2 Flow (L/min): 2          Diet, Pureed (07-06-24 @ 10:08) [Active]      PHYSICAL EXAM:  GENERAL: Elderly female, NAD  HEAD:  Atraumatic, Normocephalic  EYES: EOMI, PERRLA, conjunctiva and sclera clear  ENMT: Dry mucous membranes  CHEST/LUNG: Bilateral breath sounds, decreased RLL; No rales, rhonchi or wheezing  HEART: S1,S2 Regular rate and rhythm; No murmurs, rubs, or gallops  ABDOMEN: Soft, nontender, nondistended, +BS  EXTREMITIES: No calf tenderness bilaterally or peripheral edema   NEUROLOGY: non-focal, BUNCH x4, alert &oriented x3    LAB                          8.0    17.99 )-----------( 326      ( 06 Jul 2024 06:05 )             24.7                           8.4    23.61 )-----------( 306      ( 05 Jul 2024 12:26 )             25.2                           8.9    17.12 )-----------( 300      ( 04 Jul 2024 16:20 )             28.0     07-06    138  |  99  |  48<H>  ----------------------------<  118<H>  3.3<L>   |  22  |  1.8<H>    Ca    9.9      06 Jul 2024 06:05  Phos  3.3     07-06  Mg     2.9     07-06    TPro  5.6<L>  /  Alb  2.8<L>  /  TBili  1.0  /  DBili  x   /  AST  81<H>  /  ALT  67<H>  /  AlkPhos  122<H>  07-06      07-05    134<L>  |  96<L>  |  44<H>  ----------------------------<  136<H>  3.5   |  23  |  1.8<H>    Ca    9.1      05 Jul 2024 21:33  Phos  3.7     07-05  Mg     1.2     07-05    TPro  5.9<L>  /  Alb  3.1<L>  /  TBili  1.3<H>  /  DBili  x   /  AST  154<H>  /  ALT  76<H>  /  AlkPhos  119<H>  07-05 07-04    138  |  96<L>  |  37<H>  ----------------------------<  141<H>  2.6<LL>   |  26  |  1.4    Ca    9.6      04 Jul 2024 16:20    TPro  6.6  /  Alb  3.4<L>  /  TBili  1.2  /  DBili  x   /  AST  23  /  ALT  12  /  AlkPhos  112  07-04    RAD    ACC: [id number] EXAM:  XR PELVIS AP ONLY 1-2 VIEWS   ORDERED BY: SAUMYA JORDAN     PROCEDURE DATE:  07/04/2024          INTERPRETATION:  CLINICAL HISTORY / REASON FOR EXAM: Pain status post fall    COMPARISON: Pelvis radiograph from December 16, 2018    TECHNIQUE: One view, AP pelvis radiograph    FINDINGS:    Status post left cephalomedullary nail. No acute displaced fracture.   Bilateral hip and lumbar spine degenerative changes. Osteopenia. Surgical   clips overlie the right lower pelvis.      IMPRESSION:    No acute displaced fracture.    --- End of Report ---            KACI STACY MD; Attending Radiologist  This document has been electronically signed. Jul 4 2024  8:53PM

## 2024-07-09 RX ADMIN — DOXYCYCLINE 100 MILLIGRAM(S): 100 CAPSULE ORAL at 17:22

## 2024-07-09 RX ADMIN — HEPARIN SODIUM 5000 UNIT(S): 50 INJECTION, SOLUTION INTRAVENOUS at 21:39

## 2024-07-09 RX ADMIN — CITALOPRAM 20 MILLIGRAM(S): 10 TABLET, FILM COATED ORAL at 13:05

## 2024-07-09 RX ADMIN — Medication 5 MILLIGRAM(S): at 21:39

## 2024-07-09 RX ADMIN — Medication 1 APPLICATION(S): at 06:00

## 2024-07-09 RX ADMIN — HEPARIN SODIUM 5000 UNIT(S): 50 INJECTION, SOLUTION INTRAVENOUS at 07:00

## 2024-07-09 RX ADMIN — MIDODRINE HYDROCHLORIDE 5 MILLIGRAM(S): 10 TABLET ORAL at 07:00

## 2024-07-09 RX ADMIN — MIDODRINE HYDROCHLORIDE 5 MILLIGRAM(S): 10 TABLET ORAL at 21:39

## 2024-07-09 RX ADMIN — Medication 200 MILLIGRAM(S): at 17:23

## 2024-07-09 RX ADMIN — HEPARIN SODIUM 5000 UNIT(S): 50 INJECTION, SOLUTION INTRAVENOUS at 13:05

## 2024-07-09 RX ADMIN — DOXYCYCLINE 100 MILLIGRAM(S): 100 CAPSULE ORAL at 07:00

## 2024-07-09 RX ADMIN — MIDODRINE HYDROCHLORIDE 5 MILLIGRAM(S): 10 TABLET ORAL at 13:05

## 2024-07-09 RX ADMIN — Medication 200 MILLIGRAM(S): at 07:01

## 2024-07-09 NOTE — PROGRESS NOTE ADULT - ASSESSMENT
This is a 89-year-old female, past medical history of aortic stenosis, HTN, HLD, and anxiety brought in by daughter for weakness and difficulty ambulating for the last 4 days.    IMPRESSION  #Acute hypoxic respiratory failure. Concern for Community acquired pneumonia  #Shock- Likely cardiogenic, Severe aortic stenosis. Heart failure with reduced EF  #MASSIMO over CKD- US kidney bladder normal.   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  MRSA Nares negative    RECOMMENDATIONS  -Blood cultures negative.   -Kept on IV ceftriaxone 1 gram q 24 hours and doxycycline 100mg BID PO/IV, now switched to PO vantin 200mg BID and Doxy 100mg BID for total of 7 days. (S/D of abx 7/4/24)  -Taper oxygen supplementation when able.   -Off loading to prevent pressure sores and preventive measures to avoid aspiration.   -Guarded prognosis.   -Patient will benefit from PCV 20 and RSV vaccines in 1-2 months.     If any questions, please send a message or call on [employer] Teams  Please continue to update ID with any pertinent new laboratory or radiographic findings.    Demario Gill M.D  Infectious Diseases Attending/   [employer] at [facility]

## 2024-07-09 NOTE — PROGRESS NOTE ADULT - SUBJECTIVE AND OBJECTIVE BOX
89-year-old female, past medical history of aortic stenosis, HTN, HLD, and anxiety brought in by daughter for weakness and difficulty ambulating for the last 4 days.  Up until 4 days ago, patient has been ambulating with a walker.  Patient's daughter also reports patient has decreased appetite and difficulty swallowing,  Patient states she just has no energy. Patient's daughter reports patient fell once while ambulating, but denies any head trauma or LOC. Patient denies fever/chills, chest pain/shortness of breath, abdominal pain, nausea/vomiting/diarrhea, urinary symptoms.    Interval: transferred to floor    PAST MEDICAL & SURGICAL HISTORY:    HTN (hypertension)  Hypothyroid  Flaco's disease  Aortic stenosis  Anemia  Hip fracture  s/p right ORIF    Social History:    non smoker    MEDICATIONS  (STANDING):  cefpodoxime 200 milliGRAM(s) Oral every 12 hours  chlorhexidine 2% Cloths 1 Application(s) Topical every 12 hours  citalopram 20 milliGRAM(s) Oral daily  dextrose 50% Injectable 12.5 Gram(s) IV Push once  doxycycline monohydrate Capsule 100 milliGRAM(s) Oral every 12 hours  heparin   Injectable 5000 Unit(s) SubCutaneous every 8 hours  levothyroxine Injectable 25 MICROGram(s) IV Push <User Schedule>  melatonin 5 milliGRAM(s) Oral at bedtime  midodrine 5 milliGRAM(s) Oral every 8 hours  norepinephrine Infusion 0.05 MICROgram(s)/kG/Min (5.26 mL/Hr) IV Continuous <Continuous>    MEDICATIONS  (PRN):  acetaminophen  Suppository .. 650 milliGRAM(s) Rectal every 6 hours PRN Temp greater or equal to 38C (100.4F)  ondansetron Injectable 4 milliGRAM(s) IV Push every 8 hours PRN Nausea and/or Vomiting      Allergies    sulfonamides (Unknown)  penicillins (Unknown)    Intolerances    Vital Signs Last 24 Hrs  T(C): 36.8 (09 Jul 2024 05:00), Max: 36.9 (09 Jul 2024 02:33)  T(F): 98.2 (09 Jul 2024 05:00), Max: 98.4 (09 Jul 2024 02:33)  HR: 80 (09 Jul 2024 05:00) (64 - 85)  BP: 111/68 (09 Jul 2024 05:00) (89/50 - 111/68)  BP(mean): 76 (09 Jul 2024 00:58) (64 - 79)  RR: 18 (09 Jul 2024 05:00) (18 - 42)  SpO2: 93% (09 Jul 2024 05:00) (92% - 100%)    Parameters below as of 09 Jul 2024 05:00  Patient On (Oxygen Delivery Method): nasal cannula      Diet, Pureed (07-06-24 @ 10:08) [Active]      PHYSICAL EXAM:  GENERAL: Elderly female, NAD  HEAD:  Atraumatic, Normocephalic  EYES: EOMI, PERRLA, conjunctiva and sclera clear  ENMT: Dry mucous membranes  CHEST/LUNG: Bilateral breath sounds, decreased RLL; No rales, rhonchi or wheezing  HEART: S1,S2 Regular rate and rhythm; No murmurs, rubs, or gallops  ABDOMEN: Soft, nontender, nondistended, +BS  EXTREMITIES: No calf tenderness bilaterally or peripheral edema   NEUROLOGY: non-focal, BUNCH x4, alert &oriented x3    LAB                          7.2    12.26 )-----------( 282      ( 08 Jul 2024 05:58 )             22.3                           8.0    17.99 )-----------( 326      ( 06 Jul 2024 06:05 )             24.7                           8.4    23.61 )-----------( 306      ( 05 Jul 2024 12:26 )             25.2                           8.9    17.12 )-----------( 300      ( 04 Jul 2024 16:20 )             28.0     07-08    133<L>  |  98  |  49<H>  ----------------------------<  98  4.0   |  23  |  1.5    Ca    9.5      08 Jul 2024 05:58  Mg     2.0     07-08    TPro  5.0<L>  /  Alb  2.7<L>  /  TBili  0.6  /  DBili  x   /  AST  43<H>  /  ALT  52<H>  /  AlkPhos  101  07-08      07-06    138  |  99  |  48<H>  ----------------------------<  118<H>  3.3<L>   |  22  |  1.8<H>    Ca    9.9      06 Jul 2024 06:05  Phos  3.3     07-06  Mg     2.9     07-06    TPro  5.6<L>  /  Alb  2.8<L>  /  TBili  1.0  /  DBili  x   /  AST  81<H>  /  ALT  67<H>  /  AlkPhos  122<H>  07-06 07-05    134<L>  |  96<L>  |  44<H>  ----------------------------<  136<H>  3.5   |  23  |  1.8<H>    Ca    9.1      05 Jul 2024 21:33  Phos  3.7     07-05  Mg     1.2     07-05    TPro  5.9<L>  /  Alb  3.1<L>  /  TBili  1.3<H>  /  DBili  x   /  AST  154<H>  /  ALT  76<H>  /  AlkPhos  119<H>  07-05 07-04    138  |  96<L>  |  37<H>  ----------------------------<  141<H>  2.6<LL>   |  26  |  1.4    Ca    9.6      04 Jul 2024 16:20    TPro  6.6  /  Alb  3.4<L>  /  TBili  1.2  /  DBili  x   /  AST  23  /  ALT  12  /  AlkPhos  112  07-04    RAD    ACC: [id number] EXAM:  XR PELVIS AP ONLY 1-2 VIEWS   ORDERED BY: SAUMYA JORDAN     PROCEDURE DATE:  07/04/2024          INTERPRETATION:  CLINICAL HISTORY / REASON FOR EXAM: Pain status post fall    COMPARISON: Pelvis radiograph from December 16, 2018    TECHNIQUE: One view, AP pelvis radiograph    FINDINGS:    Status post left cephalomedullary nail. No acute displaced fracture.   Bilateral hip and lumbar spine degenerative changes. Osteopenia. Surgical   clips overlie the right lower pelvis.      IMPRESSION:    No acute displaced fracture.    --- End of Report ---            KACI STACY MD; Attending Radiologist  This document has been electronically signed. Jul 4 2024  8:53PM

## 2024-07-09 NOTE — SWALLOW BEDSIDE ASSESSMENT ADULT - NS SPL SWALLOW CLINIC TRIAL FT
mild oral dysphagia for thin liquids, anterior loss of bolus, potentially due to weakness and increased lethargy. Suspect pharyngeal dysphagia with thins. Nasal and oral regurgitation for thins  Daughter reported that pt was coughing on mild thick and had given her mod thick. Pt no overts today for mild thick. Also daughter reports pt had ice cream yesterday and was educated on the fact that ice cream is a thin liquid and pt is having overts on thin liquids.

## 2024-07-09 NOTE — SWALLOW BEDSIDE ASSESSMENT ADULT - ASR SWALLOW RECOMMEND DIAG
possibility of FEES or MBS was presented to the family
daughter at bedside consents to MBS/VFSS/MBS
possibility of FEES or MBS was presented to the family
possibility of FEES or MBS was presented to the family

## 2024-07-09 NOTE — SWALLOW BEDSIDE ASSESSMENT ADULT - ORAL PHASE
+ nasal and oral regurgitation/Decreased anterior-posterior movement of the bolus Within functional limits

## 2024-07-09 NOTE — PROGRESS NOTE ADULT - SUBJECTIVE AND OBJECTIVE BOX
ARIAS PUGA  89y, Female    LOS  5d    INTERVAL EVENTS/HPI  - No acute events overnight  - T(F): , Max: 98.4 (07-09-24 @ 02:33)  - WBC Count: 12.26 (07-08-24 @ 05:58)  WBC Count: 12.01 (07-07-24 @ 06:28)  - Creatinine: 1.5 (07-08-24 @ 05:58)    REVIEW OF SYSTEMS:  CONSTITUTIONAL: No fever or chills  HEENT: No sore throat  RESPIRATORY: No cough, no shortness of breath  CARDIOVASCULAR: No chest pain or palpitations  GASTROINTESTINAL: No abdominal or epigastric pain  GENITOURINARY: No dysuria  NEUROLOGICAL: No headache/dizziness  MSK: No joint pain, erythema, or swelling; no back pain  SKIN: No itching, rashes  All other ROS negative except noted above    Prior hospital charts reviewed [Yes]  Primary team notes reviewed [Yes]  Other consultant notes reviewed [Yes]    ANTIMICROBIALS:   cefpodoxime 200 every 12 hours  doxycycline monohydrate Capsule 100 every 12 hours    OTHER MEDS: MEDICATIONS  (STANDING):  acetaminophen  Suppository .. 650 every 6 hours PRN  citalopram 20 daily  dextrose 50% Injectable 12.5 once  heparin   Injectable 5000 every 8 hours  levothyroxine Injectable 25 <User Schedule>  melatonin 5 at bedtime  midodrine 5 every 8 hours  norepinephrine Infusion 0.05 <Continuous>  ondansetron Injectable 4 every 8 hours PRN      Vital Signs Last 24 Hrs  T(F): 98.2 (07-09-24 @ 05:00), Max: 100.6 (07-04-24 @ 16:05)    Vital Signs Last 24 Hrs  HR: 80 (07-09-24 @ 05:00) (68 - 85)  BP: 111/68 (07-09-24 @ 05:00) (89/50 - 111/68)  RR: 18 (07-09-24 @ 05:00)  SpO2: 93% (07-09-24 @ 05:00) (92% - 100%)  Wt(kg): --    EXAM:  GENERAL: NAD, Frail looking. on NC   HEAD: No head lesions  NECK: Supple  CHEST/LUNG: Shallow breath sounds.   HEART: S1 S2  ABDOMEN: Soft, nontender  EXTREMITIES: No clubbing  NERVOUS SYSTEM: Alert and oriented to person  MSK: No joint erythema, swelling or pain  SKIN: No rashes or lesions, no superficial thrombophlebitis    Labs:                        7.2    12.26 )-----------( 282      ( 08 Jul 2024 05:58 )             22.3     07-08    133<L>  |  98  |  49<H>  ----------------------------<  98  4.0   |  23  |  1.5    Ca    9.5      08 Jul 2024 05:58  Mg     2.0     07-08    TPro  5.0<L>  /  Alb  2.7<L>  /  TBili  0.6  /  DBili  x   /  AST  43<H>  /  ALT  52<H>  /  AlkPhos  101  07-08      WBC Trend:  WBC Count: 12.26 (07-08-24 @ 05:58)  WBC Count: 12.01 (07-07-24 @ 06:28)  WBC Count: 17.99 (07-06-24 @ 06:05)  WBC Count: 23.61 (07-05-24 @ 12:26)      Creatine Trend:  Creatinine: 1.5 (07-08)  Creatinine: 1.7 (07-07)  Creatinine: 1.8 (07-06)  Creatinine: 1.8 (07-05)      Liver Biochemical Testing Trend:  Alanine Aminotransferase (ALT/SGPT): 52 *H* (07-08)  Alanine Aminotransferase (ALT/SGPT): 67 *H* (07-06)  Alanine Aminotransferase (ALT/SGPT): 76 *H* (07-05)  Alanine Aminotransferase (ALT/SGPT): 12 (07-04)  Aspartate Aminotransferase (AST/SGOT): 43 (07-08-24 @ 05:58)  Aspartate Aminotransferase (AST/SGOT): 81 (07-06-24 @ 06:05)  Aspartate Aminotransferase (AST/SGOT): 154 (07-05-24 @ 11:22)  Aspartate Aminotransferase (AST/SGOT): 23 (07-04-24 @ 16:20)  Bilirubin Total: 0.6 (07-08)  Bilirubin Total: 1.0 (07-06)  Bilirubin Total: 1.3 (07-05)  Bilirubin Total: 1.2 (07-04)      Trend LDH      Urinalysis Basic - ( 08 Jul 2024 05:58 )    Color: x / Appearance: x / SG: x / pH: x  Gluc: 98 mg/dL / Ketone: x  / Bili: x / Urobili: x   Blood: x / Protein: x / Nitrite: x   Leuk Esterase: x / RBC: x / WBC x   Sq Epi: x / Non Sq Epi: x / Bacteria: x        MICROBIOLOGY:  Vancomycin Level, Trough: 11.4 (07-06 @ 15:00)    Female    Urinalysis with Rflx Culture (collected 04 Jul 2024 17:55)    Culture - Blood (collected 04 Jul 2024 16:20)  Source: .Blood Blood-Peripheral  Preliminary Report:    No growth at 72 Hours    Culture - Blood (collected 04 Jul 2024 16:20)  Source: .Blood Blood-Peripheral  Preliminary Report:    No growth at 72 Hours    D-Dimer Assay, Quantitative: 532 (07-08)  Troponin T, High Sensitivity Result: 120 (07-04)  Troponin T, High Sensitivity Result: 135 (07-04)    RADIOLOGY & ADDITIONAL TESTS:  I have personally reviewed the relevant images.   CXR      CT      < from: [facility] Duplex Lower Ext Vein Scan, Bilat (07.08.24 @ 12:24) >  IMPRESSION:  No evidence of deep venous thrombosis in either lower extremity. Left   Baker's cyst.      < end of copied text >    WEIGHT  Weight (kg): 59.647 (07-09-24 @ 02:33)      All available historical records have been reviewed

## 2024-07-09 NOTE — SWALLOW BEDSIDE ASSESSMENT ADULT - SWALLOW EVAL: DIAGNOSIS
moderate oral dysphagia for thin liquids (potentially 2/2 to weakness) + nasal and oral regurgitation + suspect pharyngeal dysphagia for thins + toleration observed without overt symptoms of penetration/aspiration for puree w/ mildly thick liquids

## 2024-07-09 NOTE — CHART NOTE - NSCHARTNOTEFT_GEN_A_CORE
Patient seen and examined throughout the course of the day.      Results of Labs/Imaging discussed as well as patient's plan.    S/S eval -rec modified barium tomorrow AM    ID recs apprec    Cont to wean O2   All patient's/family questions answered.  Patient and family encouraged to contact PA with any further issues.     24-48h pend O2 requir

## 2024-07-10 LAB
ALBUMIN SERPL ELPH-MCNC: 2.9 G/DL — LOW (ref 3.5–5.2)
ALP SERPL-CCNC: 123 U/L — HIGH (ref 30–115)
ALT FLD-CCNC: 39 U/L — SIGNIFICANT CHANGE UP (ref 0–41)
ANION GAP SERPL CALC-SCNC: 13 MMOL/L — SIGNIFICANT CHANGE UP (ref 7–14)
AST SERPL-CCNC: 30 U/L — SIGNIFICANT CHANGE UP (ref 0–41)
BASOPHILS # BLD AUTO: 0.06 K/UL — SIGNIFICANT CHANGE UP (ref 0–0.2)
BASOPHILS NFR BLD AUTO: 0.4 % — SIGNIFICANT CHANGE UP (ref 0–1)
BILIRUB SERPL-MCNC: 0.7 MG/DL — SIGNIFICANT CHANGE UP (ref 0.2–1.2)
BUN SERPL-MCNC: 56 MG/DL — HIGH (ref 10–20)
CALCIUM SERPL-MCNC: 10.4 MG/DL — SIGNIFICANT CHANGE UP (ref 8.4–10.5)
CHLORIDE SERPL-SCNC: 103 MMOL/L — SIGNIFICANT CHANGE UP (ref 98–110)
CO2 SERPL-SCNC: 22 MMOL/L — SIGNIFICANT CHANGE UP (ref 17–32)
CREAT SERPL-MCNC: 1.4 MG/DL — SIGNIFICANT CHANGE UP (ref 0.7–1.5)
CULTURE RESULTS: SIGNIFICANT CHANGE UP
CULTURE RESULTS: SIGNIFICANT CHANGE UP
EGFR: 35 ML/MIN/1.73M2 — LOW
EOSINOPHIL # BLD AUTO: 0.03 K/UL — SIGNIFICANT CHANGE UP (ref 0–0.7)
EOSINOPHIL NFR BLD AUTO: 0.2 % — SIGNIFICANT CHANGE UP (ref 0–8)
GLUCOSE SERPL-MCNC: 121 MG/DL — HIGH (ref 70–99)
HCT VFR BLD CALC: 24.5 % — LOW (ref 37–47)
HGB BLD-MCNC: 7.9 G/DL — LOW (ref 12–16)
IMM GRANULOCYTES NFR BLD AUTO: 0.9 % — HIGH (ref 0.1–0.3)
LYMPHOCYTES # BLD AUTO: 0.89 K/UL — LOW (ref 1.2–3.4)
LYMPHOCYTES # BLD AUTO: 5.6 % — LOW (ref 20.5–51.1)
MCHC RBC-ENTMCNC: 27.7 PG — SIGNIFICANT CHANGE UP (ref 27–31)
MCHC RBC-ENTMCNC: 32.2 G/DL — SIGNIFICANT CHANGE UP (ref 32–37)
MCV RBC AUTO: 86 FL — SIGNIFICANT CHANGE UP (ref 81–99)
MONOCYTES # BLD AUTO: 1.22 K/UL — HIGH (ref 0.1–0.6)
MONOCYTES NFR BLD AUTO: 7.7 % — SIGNIFICANT CHANGE UP (ref 1.7–9.3)
NEUTROPHILS # BLD AUTO: 13.51 K/UL — HIGH (ref 1.4–6.5)
NEUTROPHILS NFR BLD AUTO: 85.2 % — HIGH (ref 42.2–75.2)
NRBC # BLD: 0 /100 WBCS — SIGNIFICANT CHANGE UP (ref 0–0)
PLATELET # BLD AUTO: 339 K/UL — SIGNIFICANT CHANGE UP (ref 130–400)
PMV BLD: 9.2 FL — SIGNIFICANT CHANGE UP (ref 7.4–10.4)
POTASSIUM SERPL-MCNC: 4.7 MMOL/L — SIGNIFICANT CHANGE UP (ref 3.5–5)
POTASSIUM SERPL-SCNC: 4.7 MMOL/L — SIGNIFICANT CHANGE UP (ref 3.5–5)
PROT SERPL-MCNC: 5.9 G/DL — LOW (ref 6–8)
RBC # BLD: 2.85 M/UL — LOW (ref 4.2–5.4)
RBC # FLD: 15.2 % — HIGH (ref 11.5–14.5)
SODIUM SERPL-SCNC: 138 MMOL/L — SIGNIFICANT CHANGE UP (ref 135–146)
SPECIMEN SOURCE: SIGNIFICANT CHANGE UP
SPECIMEN SOURCE: SIGNIFICANT CHANGE UP
WBC # BLD: 15.85 K/UL — HIGH (ref 4.8–10.8)
WBC # FLD AUTO: 15.85 K/UL — HIGH (ref 4.8–10.8)

## 2024-07-10 PROCEDURE — 74230 X-RAY XM SWLNG FUNCJ C+: CPT | Mod: 26

## 2024-07-10 RX ORDER — SODIUM CHLORIDE 0.9 % (FLUSH) 0.9 %
1000 SYRINGE (ML) INJECTION
Refills: 0 | Status: DISCONTINUED | OUTPATIENT
Start: 2024-07-10 | End: 2024-07-15

## 2024-07-10 RX ADMIN — MIDODRINE HYDROCHLORIDE 5 MILLIGRAM(S): 10 TABLET ORAL at 13:58

## 2024-07-10 RX ADMIN — CITALOPRAM 20 MILLIGRAM(S): 10 TABLET, FILM COATED ORAL at 11:43

## 2024-07-10 RX ADMIN — MIDODRINE HYDROCHLORIDE 5 MILLIGRAM(S): 10 TABLET ORAL at 21:21

## 2024-07-10 RX ADMIN — DOXYCYCLINE 100 MILLIGRAM(S): 100 CAPSULE ORAL at 05:56

## 2024-07-10 RX ADMIN — Medication 200 MILLIGRAM(S): at 18:00

## 2024-07-10 RX ADMIN — Medication 3 MILLIGRAM(S): at 21:21

## 2024-07-10 RX ADMIN — HEPARIN SODIUM 5000 UNIT(S): 50 INJECTION, SOLUTION INTRAVENOUS at 05:57

## 2024-07-10 RX ADMIN — Medication 200 MILLIGRAM(S): at 05:56

## 2024-07-10 RX ADMIN — DOXYCYCLINE 100 MILLIGRAM(S): 100 CAPSULE ORAL at 18:00

## 2024-07-10 RX ADMIN — HEPARIN SODIUM 5000 UNIT(S): 50 INJECTION, SOLUTION INTRAVENOUS at 13:58

## 2024-07-10 RX ADMIN — MIDODRINE HYDROCHLORIDE 5 MILLIGRAM(S): 10 TABLET ORAL at 05:56

## 2024-07-10 RX ADMIN — HEPARIN SODIUM 5000 UNIT(S): 50 INJECTION, SOLUTION INTRAVENOUS at 21:21

## 2024-07-10 NOTE — MEDICAL STUDENT PROGRESS NOTE(EDUCATION) - ASSESSMENT
89-year-old female, past medical history of aortic stenosis, HTN, HLD, and anxiety brought in by daughter for weakness and difficulty ambulating for the last 4 days.  Up until 4 days ago, patient has been ambulating with a walker.  Patient's daughter also reports patient has decreased appetite and difficulty swallowing,  Patient states she just has no energy. Patient's daughter reports patient fell once while ambulating, but denies any head trauma or LOC. Patient denies fever/chills, chest pain/shortness of breath, abdominal pain, nausea/vomiting/diarrhea, urinary symptoms.      #Sepsis POA 2/2 pneumonia   -leukocytosis and afebrile  -ID recommended   -IV Ceftriaxone and doxycycline discontinued - 07/08/2024  - changed to Vantin PO and Doxy PO for 7days   -96% O2 2L - tapering the O2 supplement  -Pain control and antipyretics prn   -Monitoring I&O - aspiration precaution  - PCV 20 and RSV vaccines within 1-2 months - FUP OP      #Elevated troponin  -Patient denies CP.   -EKG: Sinus rhythm with PAC, LVH,    -C/w Telemetry monitoring   -F/U cardiology consult    -Continue to trend     #Hypokalemia - resolved  -F/U repeat labs and replete prn     #Aortic stenosis   -F/U echo  -Echo 2018: normal LV function, G1DD, mild MR/AR, severe AS     #Hypothyroidism  -Synthroid 50 mcg      #HTN/HLD  -holding statin and HCTZ - resume once cleared by speech and swallow     #Anemia (Baseline Hgb 9-11)  -Continue to monitor     #Diet:    #DVT ppx: HSQ   #Activity: OOB with assistance   #ADVANCE CARE CPR  #DISPOSITION:  D/C planning

## 2024-07-10 NOTE — SWALLOW VFSS/MBS ASSESSMENT ADULT - DIAGNOSTIC IMPRESSIONS
Moderate oral dysphagia. Mod-severe pharyngeal dysphagia for thins. Mild pharyngeal dysphagia for mildly thick, mod thick, and solids.

## 2024-07-10 NOTE — SWALLOW VFSS/MBS ASSESSMENT ADULT - ORAL PHASE
Reduced anterior - posterior transport/Residue in oral cavity/Uncontrolled bolus / spillover in hypopharynx

## 2024-07-10 NOTE — SWALLOW VFSS/MBS ASSESSMENT ADULT - SLP PERTINENT HISTORY OF CURRENT PROBLEM
This is a 89-year-old female, past medical history of aortic stenosis, HTN, HLD, and anxiety brought in by daughter for weakness and difficulty ambulating for the last 4 days.  Up until 4 days ago, patient has been ambulating with a walker.  Patient's daughter also reports patient has decreased appetite and difficulty swallowing,  Patient states she just has no energy. Patient's daughter reports patient fell once while ambulating, but denies any head trauma or LOC. Patient denies fever/chills, chest pain/shortness of breath, abdominal pain, nausea/vomiting/diarrhea, urinary symptoms.

## 2024-07-10 NOTE — SWALLOW VFSS/MBS ASSESSMENT ADULT - SLP GENERAL OBSERVATIONS
pt received in radiology suite via stretcher with supplemental O2 via NC. Lethargic but wakes to stimuli

## 2024-07-10 NOTE — SWALLOW VFSS/MBS ASSESSMENT ADULT - ESOPHAGEAL STAGE
+noted narrowing of the upper esophagus. At bedside pt presents with frequent belching. consider GI consult.

## 2024-07-10 NOTE — CHART NOTE - NSCHARTNOTEFT_GEN_A_CORE
Patient seen and examined throughout the course of the day.    Per daughter pt was coughing with all intake this AM. She was requesting IVF    Modified barium completed - severe aspir with thin liquids. Mild-mod with thickened liquids  Rec aspiration precautions    Results of Labs/Imaging discussed as well as patient's plan.      Reassess IVF in AM if pt tolerating PO    All patient's/family questions answered.  Patient and family encouraged to contact PA with any further issues.

## 2024-07-10 NOTE — PROGRESS NOTE ADULT - SUBJECTIVE AND OBJECTIVE BOX
89-year-old female, past medical history of aortic stenosis, HTN, HLD, and anxiety brought in by daughter for weakness and difficulty ambulating for the last 4 days.  Up until 4 days ago, patient has been ambulating with a walker.  Patient's daughter also reports patient has decreased appetite and difficulty swallowing,  Patient states she just has no energy. Patient's daughter reports patient fell once while ambulating, but denies any head trauma or LOC. Patient denies fever/chills, chest pain/shortness of breath, abdominal pain, nausea/vomiting/diarrhea, urinary symptoms.    Interval: transferred to floor    PAST MEDICAL & SURGICAL HISTORY:    HTN (hypertension)  Hypothyroid  Flaco's disease  Aortic stenosis  Anemia  Hip fracture  s/p right ORIF    Social History:    non smoker    MEDICATIONS  (STANDING):  cefpodoxime 200 milliGRAM(s) Oral every 12 hours  citalopram 20 milliGRAM(s) Oral daily  dextrose 50% Injectable 12.5 Gram(s) IV Push once  doxycycline monohydrate Capsule 100 milliGRAM(s) Oral every 12 hours  heparin   Injectable 5000 Unit(s) SubCutaneous every 8 hours  levothyroxine Injectable 25 MICROGram(s) IV Push <User Schedule>  melatonin 5 milliGRAM(s) Oral at bedtime  midodrine 5 milliGRAM(s) Oral every 8 hours  norepinephrine Infusion 0.05 MICROgram(s)/kG/Min (5.26 mL/Hr) IV Continuous <Continuous>    MEDICATIONS  (PRN):  acetaminophen  Suppository .. 650 milliGRAM(s) Rectal every 6 hours PRN Temp greater or equal to 38C (100.4F)  ondansetron Injectable 4 milliGRAM(s) IV Push every 8 hours PRN Nausea and/or Vomiting      Allergies    sulfonamides (Unknown)  penicillins (Unknown)    Intolerances    Vital Signs Last 24 Hrs  T(C): 36.6 (10 Jul 2024 05:37), Max: 36.6 (09 Jul 2024 13:41)  T(F): 97.9 (10 Jul 2024 05:37), Max: 97.9 (10 Jul 2024 05:37)  HR: 95 (10 Jul 2024 05:37) (60 - 95)  BP: 114/71 (10 Jul 2024 05:37) (108/61 - 114/71)  BP(mean): --  RR: 18 (10 Jul 2024 05:37) (16 - 18)  SpO2: 95% (10 Jul 2024 05:37) (93% - 97%)    Parameters below as of 10 Jul 2024 05:37  Patient On (Oxygen Delivery Method): nasal cannula  O2 Flow (L/min): 3      Diet, Pureed (07-06-24 @ 10:08) [Active]      PHYSICAL EXAM:  GENERAL: Elderly female, NAD  HEAD:  Atraumatic, Normocephalic  EYES: EOMI, PERRLA, conjunctiva and sclera clear  ENMT: Dry mucous membranes  CHEST/LUNG: Bilateral breath sounds, decreased RLL; No rales, rhonchi or wheezing  HEART: S1,S2 Regular rate and rhythm; No murmurs, rubs, or gallops  ABDOMEN: Soft, nontender, nondistended, +BS  EXTREMITIES: No calf tenderness bilaterally or peripheral edema   NEUROLOGY: non-focal, BUNCH x4, alert &oriented x3    LAB                          7.2    12.26 )-----------( 282      ( 08 Jul 2024 05:58 )             22.3                           8.0    17.99 )-----------( 326      ( 06 Jul 2024 06:05 )             24.7                           8.4    23.61 )-----------( 306      ( 05 Jul 2024 12:26 )             25.2                           8.9    17.12 )-----------( 300      ( 04 Jul 2024 16:20 )             28.0     07-08    133<L>  |  98  |  49<H>  ----------------------------<  98  4.0   |  23  |  1.5    Ca    9.5      08 Jul 2024 05:58  Mg     2.0     07-08    TPro  5.0<L>  /  Alb  2.7<L>  /  TBili  0.6  /  DBili  x   /  AST  43<H>  /  ALT  52<H>  /  AlkPhos  101  07-08      07-06    138  |  99  |  48<H>  ----------------------------<  118<H>  3.3<L>   |  22  |  1.8<H>    Ca    9.9      06 Jul 2024 06:05  Phos  3.3     07-06  Mg     2.9     07-06    TPro  5.6<L>  /  Alb  2.8<L>  /  TBili  1.0  /  DBili  x   /  AST  81<H>  /  ALT  67<H>  /  AlkPhos  122<H>  07-06 07-05    134<L>  |  96<L>  |  44<H>  ----------------------------<  136<H>  3.5   |  23  |  1.8<H>    Ca    9.1      05 Jul 2024 21:33  Phos  3.7     07-05  Mg     1.2     07-05    TPro  5.9<L>  /  Alb  3.1<L>  /  TBili  1.3<H>  /  DBili  x   /  AST  154<H>  /  ALT  76<H>  /  AlkPhos  119<H>  07-05 07-04    138  |  96<L>  |  37<H>  ----------------------------<  141<H>  2.6<LL>   |  26  |  1.4    Ca    9.6      04 Jul 2024 16:20    TPro  6.6  /  Alb  3.4<L>  /  TBili  1.2  /  DBili  x   /  AST  23  /  ALT  12  /  AlkPhos  112  07-04    RAD    ACC: [id number] EXAM:  XR PELVIS AP ONLY 1-2 VIEWS   ORDERED BY: SAUMYA JORDAN     PROCEDURE DATE:  07/04/2024          INTERPRETATION:  CLINICAL HISTORY / REASON FOR EXAM: Pain status post fall    COMPARISON: Pelvis radiograph from December 16, 2018    TECHNIQUE: One view, AP pelvis radiograph    FINDINGS:    Status post left cephalomedullary nail. No acute displaced fracture.   Bilateral hip and lumbar spine degenerative changes. Osteopenia. Surgical   clips overlie the right lower pelvis.      IMPRESSION:    No acute displaced fracture.    --- End of Report ---            KACI STACY MD; Attending Radiologist  This document has been electronically signed. Jul 4 2024  8:53PM

## 2024-07-10 NOTE — SWALLOW VFSS/MBS ASSESSMENT ADULT - PHARYNGEAL PHASE COMMENTS
Mod-severe pharyngeal dysphagia for thins. Mild pharyngeal dysphagia for mildly thick, mod thick, and solids.

## 2024-07-10 NOTE — PROGRESS NOTE ADULT - ASSESSMENT
89-year-old female, past medical history of aortic stenosis, HTN, HLD, and anxiety brought in by daughter for weakness and difficulty ambulating for the last 4 days.  Up until 4 days ago, patient has been ambulating with a walker.  Patient's daughter also reports patient has decreased appetite and difficulty swallowing,  Patient states she just has no energy. Patient's daughter reports patient fell once while ambulating, but denies any head trauma or LOC. Patient denies fever/chills, chest pain/shortness of breath, abdominal pain, nausea/vomiting/diarrhea, urinary symptoms.      #Sepsis POA 2/2 pneumonia     -follow id rec  -C/w IVF    -Monitor for fevers and leukocytosis  -Pain control and antipyretics prn   -Supplemental O2 prn   -Monitor I&O    #Elevated troponin  -Patient denies CP.   -EKG: Sinus rhythm with PAC, LVH,    -C/w Telemetry monitoring   -F/U cardiology consult    -Continue to trend     #Hypokalemia  -F/U repeat labs and replete prn     #Aortic stenosis   -F/U echo  -Echo 2018: normal LV function, G1DD, mild MR/AR, severe AS     #Hypothyroidism  -Synthroid 50 mcg      #HTN/HLD  -holding statin and HCTZ - resume once cleared by speech and swallow     #Anemia (Baseline Hgb 9-11)  -Continue to monitor     #Diet:    #DVT ppx: HSQ   #Activity: OOB with assistance   #[facility] CPR  #DISPOSITION:  D/C planning

## 2024-07-10 NOTE — MEDICAL STUDENT PROGRESS NOTE(EDUCATION) - SUBJECTIVE AND OBJECTIVE BOX
Emma Salcedo - 89y/o female     Patient was seen and examine at bedside today. Patient has been sleeping all day. Patient ate breakfast today but was unable hold it. As per patient's daughter, Patient coughed up phlegm right after eating some of her breakfast. Patient will be going for modified barium today.     Vital Signs Last 24 Hrs  T(C): 36.6 (10 Jul 2024 05:37), Max: 36.6 (09 Jul 2024 13:41)  T(F): 97.9 (10 Jul 2024 05:37), Max: 97.9 (10 Jul 2024 05:37)  HR: 85 (10 Jul 2024 08:15) (60 - 95)  BP: 114/71 (10 Jul 2024 05:37) (108/61 - 114/71)  BP(mean): --  RR: 20 (10 Jul 2024 08:15) (16 - 20)  SpO2: 96% (10 Jul 2024 08:15) (93% - 97%)    Parameters below as of 10 Jul 2024 08:15  Patient On (Oxygen Delivery Method): nasal cannula  O2 Flow (L/min): 2    GENERAL: sleepy, inattentive, lying in bed, no sign of distress  HEAD:  Atraumatic, Normocephalic  EYES: EOMI, PERRLA, conjunctiva and sclera clear  ENT: Moist mucous membranes  NECK: Supple  CHEST/LUNG: Clear to auscultation bilaterally; No rales, rhonchi, wheezing, or rubs. Unlabored respirations  HEART: Regular rate and rhythm; No murmurs, rubs, or gallops  EXTREMITIES: No clubbing, cyanosis, or edema.    < from: Xray Chest 1 View-PORTABLE IMMEDIATE (07.04.24 @ 17:27) >  IMPRESSION:    Right greater than left bibasilar airspace opacities/effusions consistent   with an infectious/inflammatory process.      < end of copied text >    < from: Xray Pelvis AP only (07.04.24 @ 17:27) >  IMPRESSION:    No acute displaced fracture.    < end of copied text >      < from: US Kidney and Bladder (07.05.24 @ 18:17) >  IMPRESSION:  Normal renal ultrasound.      < end of copied text >  < from: Xray Chest 1 View AP/PA (07.06.24 @ 07:14) >  impression:      Support devices: EKG leads overlie thorax, obscuring anatomy.    Cardiac/ Mediastinum: Stable    Lungs/ Pleura: Increasing right greater than left parenchymal   opacity/effusions.    Skeletal/ soft tissues: Stable degenerative changes    < end of copied text >    < from: [facility] Duplex Lower Ext Vein Scan, Bilat (07.08.24 @ 12:24) >  IMPRESSION:  No evidence of deep venous thrombosis in either lower extremity. Left   Baker's cyst.        < end of copied text >         Emma Salcedo - 89y/o female     Patient was seen and examine at bedside today. Patient has been sleeping all day. Patient ate breakfast today but was unable to hold it. As per patient's daughter, Patient coughed up phlegm right after eating some of her breakfast. Patient will be going for modified barium today.     Vital Signs Last 24 Hrs  T(C): 36.6 (10 Jul 2024 05:37), Max: 36.6 (09 Jul 2024 13:41)  T(F): 97.9 (10 Jul 2024 05:37), Max: 97.9 (10 Jul 2024 05:37)  HR: 85 (10 Jul 2024 08:15) (60 - 95)  BP: 114/71 (10 Jul 2024 05:37) (108/61 - 114/71)  BP(mean): --  RR: 20 (10 Jul 2024 08:15) (16 - 20)  SpO2: 96% (10 Jul 2024 08:15) (93% - 97%)    Parameters below as of 10 Jul 2024 08:15  Patient On (Oxygen Delivery Method): nasal cannula  O2 Flow (L/min): 2    GENERAL: sleepy, inattentive, lying in bed, no sign of distress  HEAD:  Atraumatic, Normocephalic  EYES: EOMI, PERRLA, conjunctiva and sclera clear  ENT: Moist mucous membranes  NECK: Supple  CHEST/LUNG: Clear to auscultation bilaterally; No rales, rhonchi, wheezing, or rubs. Unlabored respirations  HEART: Regular rate and rhythm; No murmurs, rubs, or gallops  EXTREMITIES: No clubbing, cyanosis, or edema.    < from: Xray Chest 1 View-PORTABLE IMMEDIATE (07.04.24 @ 17:27) >  IMPRESSION:    Right greater than left bibasilar airspace opacities/effusions consistent   with an infectious/inflammatory process.      < end of copied text >    < from: Xray Pelvis AP only (07.04.24 @ 17:27) >  IMPRESSION:    No acute displaced fracture.    < end of copied text >      < from: US Kidney and Bladder (07.05.24 @ 18:17) >  IMPRESSION:  Normal renal ultrasound.      < end of copied text >  < from: Xray Chest 1 View AP/PA (07.06.24 @ 07:14) >  impression:      Support devices: EKG leads overlie thorax, obscuring anatomy.    Cardiac/ Mediastinum: Stable    Lungs/ Pleura: Increasing right greater than left parenchymal   opacity/effusions.    Skeletal/ soft tissues: Stable degenerative changes    < end of copied text >    < from: VA Duplex Lower Ext Vein Scan, Bilat (07.08.24 @ 12:24) >  IMPRESSION:  No evidence of deep venous thrombosis in either lower extremity. Left   Baker's cyst.        < end of copied text >

## 2024-07-11 LAB
ALBUMIN SERPL ELPH-MCNC: 2.9 G/DL — LOW (ref 3.5–5.2)
ALP SERPL-CCNC: 116 U/L — HIGH (ref 30–115)
ALT FLD-CCNC: 36 U/L — SIGNIFICANT CHANGE UP (ref 0–41)
ANION GAP SERPL CALC-SCNC: 15 MMOL/L — HIGH (ref 7–14)
AST SERPL-CCNC: 29 U/L — SIGNIFICANT CHANGE UP (ref 0–41)
BASOPHILS # BLD AUTO: 0.03 K/UL — SIGNIFICANT CHANGE UP (ref 0–0.2)
BASOPHILS NFR BLD AUTO: 0.2 % — SIGNIFICANT CHANGE UP (ref 0–1)
BILIRUB SERPL-MCNC: 0.7 MG/DL — SIGNIFICANT CHANGE UP (ref 0.2–1.2)
BUN SERPL-MCNC: 56 MG/DL — HIGH (ref 10–20)
CALCIUM SERPL-MCNC: 10.5 MG/DL — SIGNIFICANT CHANGE UP (ref 8.4–10.5)
CHLORIDE SERPL-SCNC: 103 MMOL/L — SIGNIFICANT CHANGE UP (ref 98–110)
CO2 SERPL-SCNC: 21 MMOL/L — SIGNIFICANT CHANGE UP (ref 17–32)
CREAT SERPL-MCNC: 1.6 MG/DL — HIGH (ref 0.7–1.5)
EGFR: 30 ML/MIN/1.73M2 — LOW
EOSINOPHIL # BLD AUTO: 0.01 K/UL — SIGNIFICANT CHANGE UP (ref 0–0.7)
EOSINOPHIL NFR BLD AUTO: 0.1 % — SIGNIFICANT CHANGE UP (ref 0–8)
GLUCOSE SERPL-MCNC: 145 MG/DL — HIGH (ref 70–99)
HCT VFR BLD CALC: 25.5 % — LOW (ref 37–47)
HGB BLD-MCNC: 8 G/DL — LOW (ref 12–16)
IMM GRANULOCYTES NFR BLD AUTO: 1 % — HIGH (ref 0.1–0.3)
LYMPHOCYTES # BLD AUTO: 0.33 K/UL — LOW (ref 1.2–3.4)
LYMPHOCYTES # BLD AUTO: 2.6 % — LOW (ref 20.5–51.1)
MAGNESIUM SERPL-MCNC: 1.9 MG/DL — SIGNIFICANT CHANGE UP (ref 1.8–2.4)
MCHC RBC-ENTMCNC: 27.3 PG — SIGNIFICANT CHANGE UP (ref 27–31)
MCHC RBC-ENTMCNC: 31.4 G/DL — LOW (ref 32–37)
MCV RBC AUTO: 87 FL — SIGNIFICANT CHANGE UP (ref 81–99)
MONOCYTES # BLD AUTO: 0.14 K/UL — SIGNIFICANT CHANGE UP (ref 0.1–0.6)
MONOCYTES NFR BLD AUTO: 1.1 % — LOW (ref 1.7–9.3)
NEUTROPHILS # BLD AUTO: 11.94 K/UL — HIGH (ref 1.4–6.5)
NEUTROPHILS NFR BLD AUTO: 95 % — HIGH (ref 42.2–75.2)
NRBC # BLD: 0 /100 WBCS — SIGNIFICANT CHANGE UP (ref 0–0)
PLATELET # BLD AUTO: 334 K/UL — SIGNIFICANT CHANGE UP (ref 130–400)
PMV BLD: 9 FL — SIGNIFICANT CHANGE UP (ref 7.4–10.4)
POTASSIUM SERPL-MCNC: 5 MMOL/L — SIGNIFICANT CHANGE UP (ref 3.5–5)
POTASSIUM SERPL-SCNC: 5 MMOL/L — SIGNIFICANT CHANGE UP (ref 3.5–5)
PROT SERPL-MCNC: 5.8 G/DL — LOW (ref 6–8)
RBC # BLD: 2.93 M/UL — LOW (ref 4.2–5.4)
RBC # FLD: 15.4 % — HIGH (ref 11.5–14.5)
SODIUM SERPL-SCNC: 139 MMOL/L — SIGNIFICANT CHANGE UP (ref 135–146)
WBC # BLD: 12.57 K/UL — HIGH (ref 4.8–10.8)
WBC # FLD AUTO: 12.57 K/UL — HIGH (ref 4.8–10.8)

## 2024-07-11 PROCEDURE — 99223 1ST HOSP IP/OBS HIGH 75: CPT

## 2024-07-11 RX ORDER — IPRATROPIUM BROMIDE AND ALBUTEROL SULFATE .5; 3 MG/3ML; MG/3ML
3 SOLUTION RESPIRATORY (INHALATION) ONCE
Refills: 0 | Status: COMPLETED | OUTPATIENT
Start: 2024-07-11 | End: 2024-07-11

## 2024-07-11 RX ORDER — METHYLPREDNISOLONE ACETATE 20 MG/ML
40 VIAL (ML) INJECTION ONCE
Refills: 0 | Status: COMPLETED | OUTPATIENT
Start: 2024-07-11 | End: 2024-07-11

## 2024-07-11 RX ORDER — PANTOPRAZOLE SODIUM 40 MG/10ML
40 INJECTION, POWDER, FOR SOLUTION INTRAVENOUS
Refills: 0 | Status: DISCONTINUED | OUTPATIENT
Start: 2024-07-11 | End: 2024-07-13

## 2024-07-11 RX ADMIN — CITALOPRAM 20 MILLIGRAM(S): 10 TABLET, FILM COATED ORAL at 17:24

## 2024-07-11 RX ADMIN — MIDODRINE HYDROCHLORIDE 5 MILLIGRAM(S): 10 TABLET ORAL at 21:53

## 2024-07-11 RX ADMIN — DOXYCYCLINE 100 MILLIGRAM(S): 100 CAPSULE ORAL at 05:33

## 2024-07-11 RX ADMIN — MIDODRINE HYDROCHLORIDE 5 MILLIGRAM(S): 10 TABLET ORAL at 15:02

## 2024-07-11 RX ADMIN — IPRATROPIUM BROMIDE AND ALBUTEROL SULFATE 3 MILLILITER(S): .5; 3 SOLUTION RESPIRATORY (INHALATION) at 00:55

## 2024-07-11 RX ADMIN — Medication 25 MICROGRAM(S): at 05:33

## 2024-07-11 RX ADMIN — Medication 200 MILLIGRAM(S): at 05:35

## 2024-07-11 RX ADMIN — PANTOPRAZOLE SODIUM 40 MILLIGRAM(S): 40 INJECTION, POWDER, FOR SOLUTION INTRAVENOUS at 18:39

## 2024-07-11 RX ADMIN — Medication 200 MILLIGRAM(S): at 18:39

## 2024-07-11 RX ADMIN — MIDODRINE HYDROCHLORIDE 5 MILLIGRAM(S): 10 TABLET ORAL at 05:34

## 2024-07-11 RX ADMIN — HEPARIN SODIUM 5000 UNIT(S): 50 INJECTION, SOLUTION INTRAVENOUS at 21:53

## 2024-07-11 RX ADMIN — Medication 1 APPLICATION(S): at 05:34

## 2024-07-11 RX ADMIN — DOXYCYCLINE 100 MILLIGRAM(S): 100 CAPSULE ORAL at 17:25

## 2024-07-11 RX ADMIN — HEPARIN SODIUM 5000 UNIT(S): 50 INJECTION, SOLUTION INTRAVENOUS at 05:32

## 2024-07-11 RX ADMIN — Medication 40 MILLIGRAM(S): at 01:55

## 2024-07-11 RX ADMIN — HEPARIN SODIUM 5000 UNIT(S): 50 INJECTION, SOLUTION INTRAVENOUS at 15:02

## 2024-07-11 NOTE — PROGRESS NOTE ADULT - ASSESSMENT
89-year-old female, past medical history of aortic stenosis, HTN, HLD, and anxiety brought in by daughter for weakness and difficulty ambulating for the last 4 days.  Up until 4 days ago, patient has been ambulating with a walker.  Patient's daughter also reports patient has decreased appetite and difficulty swallowing,  Patient states she just has no energy. Patient's daughter reports patient fell once while ambulating, but denies any head trauma or LOC. Patient denies fever/chills, chest pain/shortness of breath, abdominal pain, nausea/vomiting/diarrhea, urinary symptoms.      #Sepsis POA 2/2 pneumonia     -follow id rec  -C/w IVF    -Monitor for fevers and leukocytosis  -Pain control and antipyretics prn   -Supplemental O2 prn   -Monitor I&O    #Elevated troponin  -Patient denies CP.   -EKG: Sinus rhythm with PAC, LVH,      # Dysphagia     - reviewed MBS  - GI consult    #Hypokalemia  -F/U repeat labs and replete prn     #Aortic stenosis   -F/U echo  -Echo 2018: normal LV function, G1DD, mild MR/AR, severe AS     #Hypothyroidism  -Synthroid 50 mcg      #HTN/HLD  -holding statin and HCTZ - resume once cleared by speech and swallow     #Anemia (Baseline Hgb 9-11)  -Continue to monitor     #Diet:    #DVT ppx: HSQ   #Activity: OOB with assistance   #[facility] CPR  #DISPOSITION:  D/C planning

## 2024-07-11 NOTE — CONSULT NOTE ADULT - ASSESSMENT
Assessment and Plan  Case of a 89 year old female patient known to have a history of AS, HTN, DL, and anxiety who was brought to the ED on 07/04 for evaluation of weakness, difficulty ambulating, and reduced PO intake, found to be hemodynamically stable with evidence of dysphagia on barium swallow. We are consulted for concern of dysphagia.      Moderate Oral Dysphagia; Moderate-Severe Pharyngeal Dysphagia        - Follow up with our [facility] located at [address] [place] [address]. Phone Number: [phone]    - Follow up with our [facility] located at [address] [place] [address], [address]. Telephone No: [phone]    Thank you for your consult.  - Please note that plan was discussed with Dr. VANCE and communicated with medical team.   - Please reach GI on [phone] during weekdays till 5pm.  - Please call the GI service line after 5pm on Weekdays and anytime on Weekends: [phone].      Anson Talbert MD  PGY - 4 Gastroenterology Fellow   [facility]     Assessment and Plan  Case of a 89 year old female patient known to have a history of AS, HTN, DL, and anxiety who was brought to the ED on 07/04 for evaluation of weakness, difficulty ambulating, and reduced PO intake, found to be hemodynamically stable with evidence of dysphagia on barium swallow. We are consulted for concern of dysphagia.      Reduced PO Intake in Setting of Metabolic Encephalopathy  Moderate Oral Dysphagia; Moderate-Severe Pharyngeal Dysphagia  No Evidence of Aspiration  Chronic Anemia with Drop in Hb but No Overt Bleeding  * Presentation: weakness and reduced PO intake since 07/01; reports difficulty swallowing pills since 07/01 associated with episodes of aspirating with water; no globus sensation per daughter; no odynophagia or nausea or vomiting or unintentional weight loss; has daily BMs with no melena or hematochezia or change in habits; no NSAID use  * Hemodynamically stable  * Labs: WBC 17.12 on 07/04 -> 12.57 on 07/11; Hb 8.9 on 07/04 -> 8 on 07/11 (baseline Hb 11 in 2018; no previous iron studies)  * Urinalysis negative on 07/04  * No brain/abdominal imaging performed  * Barium swallow on 07/10 Moderate Oral Dysphagia; Moderate-Severe Pharyngeal Dysphagia; no aspiration -> recommended pureed diet with mildly thickened liquids  * Daughter not sure about prior EGD or Colonoscopy  * No family history of GI malignancies    RECOMMENDATIONS  - In the setting chronic anemia and dysphagia, patient would ideally benefit from an EGD and colonoscopy. After discussion of goals of care and plan with daughter (HCP Ms Fox) at bedside, and in the setting of history of aortic stenosis and old, decision was made to hold off endoscopic evaluation for now.  Would follow up with our [facility] located at [address] [place] [address] if family becomes agreeable. Phone Number: [phone]    - Speech and swallow evaluation appreciated: s/p barium swallow on 07/10 (Moderate Oral Dysphagia; Moderate-Severe Pharyngeal Dysphagia; no aspiration) -> recommended pureed diet with mildly thickened liquids  - Encourage PO intake. Tolerating pureed diet per family. In case of concern for reduced PO intake after management of encephalopathy, would recommend check caloric count to ensure adequate nutrition intake (meeting requirements)  - Continue IV fluids for now (on NS at 50mL/hour)  - Monitor for nausea: continue IV zofran PRN if QTc within normal  - Start PO protonix 40mg QD  - Management of metabolic encephalopathy per primary team      Elevated LFTs on Admission- Resolved  Likely DILI  * Previous LFTs: from 2018: 0.8/76/16/8  * LFTs trend: 1.3/119/154/76 on 07/05 -> 0.7/116/29/36 on 07/11  * No hypotension since admission  * No acute hepatitis panel  * No prior US abdomen  * No herbal supplements or new medications per daughter    RECOMMENDATIONs  - Trend LFTs  - Monitor MAP and keep >65mmHg  - Check hepatitis panel  - Avoid hepatotoxic agents  - In case of elevated LFTs, would check US abdomen      Thank you for your consult.  - Please note that plan was communicated with medical team.   - Please reach GI on [phone] during weekdays till 5pm.  - Please call the GI service line after 5pm on Weekdays and anytime on Weekends: [phone].      Anson Talbert MD  PGY - 4 Gastroenterology Fellow   [facility]     Assessment and Plan  Case of a 89 year old female patient known to have a history of AS, HTN, DL, and anxiety who was brought to the ED on 07/04 for evaluation of weakness, difficulty ambulating, and reduced PO intake, found to be hemodynamically stable with evidence of dysphagia on barium swallow. We are consulted for concern of dysphagia.      Reduced PO Intake in Setting of Metabolic Encephalopathy  Moderate Oral Dysphagia; Moderate-Severe Pharyngeal Dysphagia  No Evidence of Aspiration  Chronic Anemia with Drop in Hb but No Overt Bleeding  * Presentation: weakness and reduced PO intake since 07/01; reports difficulty swallowing pills since 07/01 associated with episodes of aspirating with water; no globus sensation per daughter; no odynophagia or nausea or vomiting or unintentional weight loss; has daily BMs with no melena or hematochezia or change in habits; no NSAID use  * Hemodynamically stable  * Labs: WBC 17.12 on 07/04 -> 12.57 on 07/11; Hb 8.9 on 07/04 -> 8 on 07/11 (baseline Hb 11 in 2018; no previous iron studies)  * Urinalysis negative on 07/04  * No brain/abdominal imaging performed  * Barium swallow on 07/10 Moderate Oral Dysphagia; Moderate-Severe Pharyngeal Dysphagia; no aspiration -> recommended pureed diet with mildly thickened liquids  * Daughter not sure about prior EGD or Colonoscopy  * No family history of GI malignancies    RECOMMENDATIONS  - In the setting chronic anemia and dysphagia, patient would ideally benefit from an EGD and colonoscopy. After discussion of goals of care and plan with daughter (HCP Ms Fox) at bedside, and in the setting of history of aortic stenosis and old, decision was made to hold off endoscopic evaluation for now.  Would follow up with our [facility] located at [address] [place] [address] if family becomes agreeable. Phone Number: [phone]    - Speech and swallow evaluation appreciated: s/p barium swallow on 07/10 (Moderate Oral Dysphagia; Moderate-Severe Pharyngeal Dysphagia; no aspiration) -> recommended pureed diet with mildly thickened liquids  - Encourage PO intake. Tolerating pureed diet per family. In case of concern for reduced PO intake after management of encephalopathy, would recommend check caloric count to ensure adequate nutrition intake (meeting requirements)  - Continue IV fluids for now (on NS at 50mL/hour)  - Monitor for nausea: continue IV zofran PRN if QTc within normal  - Start PO protonix 40mg QD  - Management of metabolic encephalopathy per primary team  - May consider barium esophagram for further evaluation of dysphagia if within goals of care    Elevated LFTs on Admission- Resolved  Likely DILI  * Previous LFTs: from 2018: 0.8/76/16/8  * LFTs trend: 1.3/119/154/76 on 07/05 -> 0.7/116/29/36 on 07/11  * No hypotension since admission  * No acute hepatitis panel  * No prior US abdomen  * No herbal supplements or new medications per daughter    RECOMMENDATIONs  - Trend LFTs  - Monitor MAP and keep >65mmHg  - Check hepatitis panel  - Avoid hepatotoxic agents  - In case of elevated LFTs, would check RUQ US w/ doppler    Thank you for your consult.  - Please note that plan was communicated with medical team.   - Please reach GI on [phone] during weekdays till 5pm.  - Please call the GI service line after 5pm on Weekdays and anytime on Weekends: [phone].      Anson Talbert MD  PGY - 4 Gastroenterology Fellow   [facility] Assessment and Plan  Case of a 89 year old female patient known to have a history of AS, HTN, DL, and anxiety who was brought to the ED on 07/04 for evaluation of weakness, difficulty ambulating, and reduced PO intake, found to be hemodynamically stable with evidence of dysphagia on barium swallow. We are consulted for concern of dysphagia.      Reduced PO Intake in Setting of Metabolic Encephalopathy  Moderate Oral Dysphagia; Moderate-Severe Pharyngeal Dysphagia  No Evidence of Aspiration  Chronic Anemia with Drop in Hb but No Overt Bleeding  * Presentation: weakness and reduced PO intake since 07/01; reports difficulty swallowing pills since 07/01 associated with episodes of aspirating with water; no globus sensation per daughter; no odynophagia or nausea or vomiting or unintentional weight loss; has daily BMs with no melena or hematochezia or change in habits; no NSAID use  * Hemodynamically stable  * Labs: WBC 17.12 on 07/04 -> 12.57 on 07/11; Hb 8.9 on 07/04 -> 8 on 07/11 (baseline Hb 11 in 2018; no previous iron studies)  * Urinalysis negative on 07/04  * No brain/abdominal imaging performed  * Barium swallow on 07/10 Moderate Oral Dysphagia; Moderate-Severe Pharyngeal Dysphagia; no aspiration -> recommended pureed diet with mildly thickened liquids  * Daughter not sure about prior EGD or Colonoscopy  * No family history of GI malignancies    RECOMMENDATIONS  - In the setting chronic anemia and dysphagia, patient would ideally benefit from an EGD and colonoscopy. After discussion of goals of care and plan with daughter (HCP Ms Fox) at bedside, and in the setting of history of aortic stenosis and old, decision was made to hold off endoscopic evaluation for now.  Would follow up with our [facility] located at [address] [place] [address] if family becomes agreeable. Phone Number: [phone]    - Speech and swallow evaluation appreciated: s/p barium swallow on 07/10 (Moderate Oral Dysphagia; Moderate-Severe Pharyngeal Dysphagia; no aspiration) -> recommended pureed diet with mildly thickened liquids  - Encourage PO intake. Tolerating pureed diet per family. In case of concern for reduced PO intake after management of encephalopathy, would recommend check caloric count to ensure adequate nutrition intake (meeting requirements)  - Continue IV fluids for now (on NS at 50mL/hour)  - Monitor for nausea: continue IV zofran PRN if QTc within normal  - Start PO protonix 40mg QD  - Check iron studies  - Management of metabolic encephalopathy per primary team  - May consider barium esophagram for further evaluation of dysphagia if within goals of care    Elevated LFTs on Admission- Resolved  Likely DILI  * Previous LFTs: from 2018: 0.8/76/16/8  * LFTs trend: 1.3/119/154/76 on 07/05 -> 0.7/116/29/36 on 07/11  * No hypotension since admission  * No acute hepatitis panel  * No prior US abdomen  * No herbal supplements or new medications per daughter    RECOMMENDATIONs  - Trend LFTs  - Monitor MAP and keep >65mmHg  - Check hepatitis panel  - Avoid hepatotoxic agents  - In case of elevated LFTs, would check RUQ US w/ doppler    Thank you for your consult.  - Please note that plan was communicated with medical team.   - Please reach GI on [phone] during weekdays till 5pm.  - Please call the GI service line after 5pm on Weekdays and anytime on Weekends: [phone].      Anson Talbert MD  PGY - 4 Gastroenterology Fellow   [facility]

## 2024-07-11 NOTE — CONSULT NOTE ADULT - ATTENDING COMMENTS
I edited the note.  Time-based billing (NON-critical care).   75 minutes spent on total encounter; more than 50% of the visit was spent counseling and / or coordinating care by the attending physician.  The necessity of the time spent during the encounter on this date of service was due to: Coordination of care.

## 2024-07-11 NOTE — GOALS OF CARE CONVERSATION - ADVANCED CARE PLANNING - CONVERSATION DETAILS
Spoke with patient's daughter, Dominique Witt, regarding patient's goals of care wishes. In the event patient has no pulse and/or is not breathing, patient's daughter would like for her mother to be DNR/DNI.

## 2024-07-11 NOTE — CHART NOTE - NSCHARTNOTEFT_GEN_A_CORE
Patient seen and examined throughout the course of the day. No acute evens overnight.   Family at bedside asking DO NOT GIVE ANY MELATONIN.   GI consult is open for dysphagia.  Will continue pureed diet with mildly thick liquids  Continue antibiotics  PT follow up  Will continue IVF hydration  Results of labs discussed as well as patient's plan.    Goals or care were discussed with family who said that will discuss it further. Pt will remain a full code at this time.  All patient's family questions answered.    Case was discussed with Dr. Islas

## 2024-07-11 NOTE — PROGRESS NOTE ADULT - SUBJECTIVE AND OBJECTIVE BOX
ARIAS PUGA  89y, Female    LOS  7d    INTERVAL EVENTS/HPI  - No acute events overnight  - T(F): , Max: 98.7 (07-11-24 @ 05:00)  - WBC Count: 12.57 (07-11-24 @ 08:27)  WBC Count: 15.85 (07-10-24 @ 08:19)  - Creatinine: 1.6 (07-11-24 @ 08:27)  Creatinine: 1.4 (07-10-24 @ 08:19)    REVIEW OF SYSTEMS:  CONSTITUTIONAL: No fever or chills  HEENT: No sore throat  RESPIRATORY: No cough, no shortness of breath  CARDIOVASCULAR: No chest pain or palpitations  GASTROINTESTINAL: No abdominal or epigastric pain  GENITOURINARY: No dysuria  NEUROLOGICAL: No headache/dizziness  MSK: No joint pain, erythema, or swelling; no back pain  SKIN: No itching, rashes  All other ROS negative except noted above    Prior hospital charts reviewed [Yes]  Primary team notes reviewed [Yes]  Other consultant notes reviewed [Yes]      ANTIMICROBIALS:   cefpodoxime 200 every 12 hours  doxycycline monohydrate Capsule 100 every 12 hours      OTHER MEDS: MEDICATIONS  (STANDING):  acetaminophen  Suppository .. 650 every 6 hours PRN  citalopram 20 daily  dextrose 50% Injectable 12.5 once  heparin   Injectable 5000 every 8 hours  levothyroxine Injectable 25 <User Schedule>  midodrine 5 every 8 hours  ondansetron Injectable 4 every 8 hours PRN      Vital Signs Last 24 Hrs  T(F): 98 (07-11-24 @ 14:00), Max: 99.6 (07-05-24 @ 17:30)    Vital Signs Last 24 Hrs  HR: 85 (07-11-24 @ 14:00) (84 - 97)  BP: 131/69 (07-11-24 @ 14:00) (107/66 - 131/69)  RR: 18 (07-11-24 @ 14:00)  SpO2: 99% (07-11-24 @ 11:10) (96% - 99%)  Wt(kg): --    EXAM:  GENERAL: NAD, Frail looking. on NC   HEAD: No head lesions  NECK: Supple  CHEST/LUNG: Shallow breath sounds.   HEART: S1 S2  ABDOMEN: Soft, nontender  EXTREMITIES: No clubbing  NERVOUS SYSTEM: Somnolent.   MSK: No joint erythema, swelling or pain  SKIN: No rashes or lesions, no superficial thrombophlebitis      Labs:                        8.0    12.57 )-----------( 334      ( 11 Jul 2024 08:27 )             25.5     07-11    139  |  103  |  56<H>  ----------------------------<  145<H>  5.0   |  21  |  1.6<H>    Ca    10.5      11 Jul 2024 08:27  Mg     1.9     07-11    TPro  5.8<L>  /  Alb  2.9<L>  /  TBili  0.7  /  DBili  x   /  AST  29  /  ALT  36  /  AlkPhos  116<H>  07-11      WBC Trend:  WBC Count: 12.57 (07-11-24 @ 08:27)  WBC Count: 15.85 (07-10-24 @ 08:19)  WBC Count: 12.26 (07-08-24 @ 05:58)  WBC Count: 12.01 (07-07-24 @ 06:28)      Creatine Trend:  Creatinine: 1.6 (07-11)  Creatinine: 1.4 (07-10)  Creatinine: 1.5 (07-08)  Creatinine: 1.7 (07-07)      Liver Biochemical Testing Trend:  Alanine Aminotransferase (ALT/SGPT): 36 (07-11)  Alanine Aminotransferase (ALT/SGPT): 39 (07-10)  Alanine Aminotransferase (ALT/SGPT): 52 *H* (07-08)  Alanine Aminotransferase (ALT/SGPT): 67 *H* (07-06)  Alanine Aminotransferase (ALT/SGPT): 76 *H* (07-05)  Aspartate Aminotransferase (AST/SGOT): 29 (07-11-24 @ 08:27)  Aspartate Aminotransferase (AST/SGOT): 30 (07-10-24 @ 08:19)  Aspartate Aminotransferase (AST/SGOT): 43 (07-08-24 @ 05:58)  Aspartate Aminotransferase (AST/SGOT): 81 (07-06-24 @ 06:05)  Aspartate Aminotransferase (AST/SGOT): 154 (07-05-24 @ 11:22)  Bilirubin Total: 0.7 (07-11)  Bilirubin Total: 0.7 (07-10)  Bilirubin Total: 0.6 (07-08)  Bilirubin Total: 1.0 (07-06)  Bilirubin Total: 1.3 (07-05)      Trend LDH      Urinalysis Basic - ( 11 Jul 2024 08:27 )    Color: x / Appearance: x / SG: x / pH: x  Gluc: 145 mg/dL / Ketone: x  / Bili: x / Urobili: x   Blood: x / Protein: x / Nitrite: x   Leuk Esterase: x / RBC: x / WBC x   Sq Epi: x / Non Sq Epi: x / Bacteria: x        MICROBIOLOGY:  Vancomycin Level, Trough: 11.4 (07-06 @ 15:00)    Female    Urinalysis with Rflx Culture (collected 04 Jul 2024 17:55)    Culture - Blood (collected 04 Jul 2024 16:20)  Source: .Blood Blood-Peripheral  Final Report:    No growth at 5 days    Culture - Blood (collected 04 Jul 2024 16:20)  Source: .Blood Blood-Peripheral  Final Report:    No growth at 5 days                                    D-Dimer Assay, Quantitative: 532 (07-08)        Troponin T, High Sensitivity Result: 120 (07-04)  Troponin T, High Sensitivity Result: 135 (07-04)          RADIOLOGY & ADDITIONAL TESTS:  I have personally reviewed the relevant images.   CXR      CT        WEIGHT  Weight (kg): 59.647 (07-09-24 @ 02:33)  Creatinine: 1.6 mg/dL (07-11-24 @ 08:27)      All available historical records have been reviewed

## 2024-07-11 NOTE — PROGRESS NOTE ADULT - ASSESSMENT
This is a 89-year-old female, past medical history of aortic stenosis, HTN, HLD, and anxiety brought in by daughter for weakness and difficulty ambulating for the last 4 days.    IMPRESSION  #Acute hypoxic respiratory failure. Concern for Community acquired pneumonia  #Shock- Likely cardiogenic, Severe aortic stenosis. Heart failure with reduced EF- Improved.  #Significant Dysphagia   #MASSIMO over CKD- US kidney bladder normal.   #Transaminitis-Improved.   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  MRSA Nares negative    RECOMMENDATIONS  -Blood cultures negative.   -Kept on IV ceftriaxone 1 gram q 24 hours and doxycycline 100mg BID PO/IV, now switched to PO vantin 200mg BID and Doxy 100mg BID for total of 7 days. (E/D 7/11/24)   -Taper oxygen supplementation when able.   -Off loading to prevent pressure sores and preventive measures to avoid aspiration.   -GI evaluation noted.   -Guarded prognosis. Goals of care.    -Patient will benefit from PCV 20 and RSV vaccines in 1-2 months.     If any questions, please send a message or call on [employer] Teams  Please continue to update ID with any pertinent new laboratory or radiographic findings.    Demario Gill M.D  Infectious Diseases Attending/   [employer] at [facility]

## 2024-07-11 NOTE — CONSULT NOTE ADULT - SUBJECTIVE AND OBJECTIVE BOX
Gastroenterology Initial Consult Note      Location: [address] ([address])  Patient Name: ARIAS PUGA  Age: 89y  Gender: Female      Chief Complaint  Patient is a 89y old Female who presents with a chief complaint of sepsis POA, pneumonia, hypokalemia (11 Jul 2024 14:29)  Primary diagnosis of Sepsis  Pneumonia due to infectious organism      Reason for Consult  Dysphagia      History of Present Illness  89 year old female patient known to have a history of AS, HTN, DL, and anxiety who was brought to the ED on 07/04 for evaluation of weakness, difficulty ambulating, and reduced PO intake, found to be hemodynamically stable with evidence of dysphagia on barium swallow. We are consulted for concern of dysphagia.  Summary:  * Presentation: weakness and reduced PO intake since 07/01; reports difficulty swallowing pills since 07/01 associated with episodes of aspirating with water; no globus sensation per daughter; no odynophagia or nausea or vomiting or unintentional weight loss; has daily BMs with no melena or hematochezia or change in habits; no NSAID use  * Hemodynamically stable  * Labs: WBC 17.12 on 07/04 -> 12.57 on 07/11; Hb 8.9 on 07/04 -> 8 on 07/11 (baseline Hb 11 in 2018; no previous iron studies)  * Urinalysis negative on 07/04  * No brain/abdominal imaging performed  * Barium swallow on 07/10 Moderate Oral Dysphagia; Moderate-Severe Pharyngeal Dysphagia; no aspiration -> recommended pureed diet with mildly thickened liquids  * Daughter not sure about prior EGD or Colonoscopy  * No family history of GI malignancies        Prior EGD:  as below      Prior Colonoscopy:  as below      Past Medical and Surgical History:  HTN (hypertension)    Hypothyroid    Dunnellon's disease    Aortic stenosis    Anemia    Hip fracture  s/p right ORIF        Home Medications:  Home Medications:  atorvastatin 20 mg oral tablet: 1 tab(s) orally once a day (04 Jul 2024 18:49)  CeleXA 20 mg oral tablet: 1 tab(s) orally once a day (18 May 2018 19:31)  D3 50 mcg (2000 intl units) oral capsule: 1 cap(s) orally once a day (04 Jul 2024 18:55)  hydroCHLOROthiazide 12.5 mg oral tablet: 1 tab(s) orally once a day (04 Jul 2024 18:51)  Synthroid 50 mcg (0.05 mg) oral tablet: 1 tab(s) orally once a day (04 Jul 2024 18:52)      Social History:  Tobacco: denies  Alcohol: denies  Drugs: denies      Allergies:  sulfonamides (Unknown)  penicillins (Unknown)      Family History:  no GI cancers        Vital Signs in the last 24 hours   Vitals Summary T(C): 36.7 (07-11-24 @ 14:00), Max: 37.1 (07-11-24 @ 05:00)  HR: 85 (07-11-24 @ 14:00) (84 - 97)  BP: 131/69 (07-11-24 @ 14:00) (107/66 - 131/69)  RR: 18 (07-11-24 @ 14:00) (16 - 20)  SpO2: 99% (07-11-24 @ 11:10) (96% - 99%)  Vent Data   Intake/ Output   07-10-24 @ 07:01  -  07-11-24 @ 07:00  --------------------------------------------------------  IN: 350 mL / OUT: 100 mL / NET: 250 mL        Physical Exam  * General Appearance: Alert, cooperative, interactive, oriented to time, place, and person, in no acute distress  * Eyes: PERRL, conjunctiva/corneas clear, EOM's intact, fundi benign, both eyes   * Lungs: Good bilateral air entry, normal breath sounds  * Heart: Regular Rate and Rhythm, normal S1 and S2, no audible murmur, rub, or gallop  * Abdomen: Symmetric, non-distended, soft, non-tender, bowel sounds active all four quadrants  * Extremities: no lower extremity pitting edema bilaterally      Investigations   Laboratory Workup      - CBC:                        8.0    12.57 )-----------( 334      ( 11 Jul 2024 08:27 )             25.5       - Hgb Trend:  8.0  07-11-24 @ 08:27  7.9  07-10-24 @ 08:19          - Chemistry:  07-11    139  |  103  |  56<H>  ----------------------------<  145<H>  5.0   |  21  |  1.6<H>    Ca    10.5      11 Jul 2024 08:27  Mg     1.9     07-11    TPro  5.8<L>  /  Alb  2.9<L>  /  TBili  0.7  /  DBili  x   /  AST  29  /  ALT  36  /  AlkPhos  116<H>  07-11    Liver panel trend:  TBili 0.7   /   AST 29   /   ALT 36   /   AlkP 116   /   Tptn 5.8   /   Alb 2.9    /   DBili --      07-11  TBili 0.7   /   AST 30   /   ALT 39   /   AlkP 123   /   Tptn 5.9   /   Alb 2.9    /   DBili --      07-10  TBili 0.6   /   AST 43   /   ALT 52   /   AlkP 101   /   Tptn 5.0   /   Alb 2.7    /   DBili --      07-08  TBili 1.0   /   AST 81   /   ALT 67   /   AlkP 122   /   Tptn 5.6   /   Alb 2.8    /   DBili --      07-06  TBili 1.3   /      /   ALT 76   /   AlkP 119   /   Tptn 5.9   /   Alb 3.1    /   DBili --      07-05  TBili 1.2   /   AST 23   /   ALT 12   /   AlkP 112   /   Tptn 6.6   /   Alb 3.4    /   DBili --      07-04      Microbiological Workup  Urinalysis Basic - ( 11 Jul 2024 08:27 )    Color: x / Appearance: x / SG: x / pH: x  Gluc: 145 mg/dL / Ketone: x  / Bili: x / Urobili: x   Blood: x / Protein: x / Nitrite: x   Leuk Esterase: x / RBC: x / WBC x   Sq Epi: x / Non Sq Epi: x / Bacteria: x          Current Medications  Standing Medications  cefpodoxime 200 milliGRAM(s) Oral every 12 hours  chlorhexidine 2% Cloths 1 Application(s) Topical <User Schedule>  citalopram 20 milliGRAM(s) Oral daily  dextrose 50% Injectable 12.5 Gram(s) IV Push once  doxycycline monohydrate Capsule 100 milliGRAM(s) Oral every 12 hours  heparin   Injectable 5000 Unit(s) SubCutaneous every 8 hours  levothyroxine Injectable 25 MICROGram(s) IV Push <User Schedule>  midodrine 5 milliGRAM(s) Oral every 8 hours  sodium chloride 0.9%. 1000 milliLiter(s) (50 mL/Hr) IV Continuous <Continuous>    PRN Medications  acetaminophen  Suppository .. 650 milliGRAM(s) Rectal every 6 hours PRN Temp greater or equal to 38C (100.4F)  ondansetron Injectable 4 milliGRAM(s) IV Push every 8 hours PRN Nausea and/or Vomiting    Singles Doses Administered  albuterol/ipratropium for Nebulization. 3 milliLiter(s) Nebulizer once  azithromycin  IVPB 500 milliGRAM(s) IV Intermittent Once  cefepime   IVPB 2000 milliGRAM(s) IV Intermittent once  chlorhexidine 2% Cloths 1 Application(s) Topical every 12 hours  chlorhexidine 2% Cloths 1 Application(s) Topical every 12 hours  ethacrynic acid 25 milliGRAM(s) Oral once  lactated ringers Bolus 2000 milliLiter(s) IV Bolus once  magnesium sulfate  IVPB 2 Gram(s) IV Intermittent every 2 hours  magnesium sulfate  IVPB 1 Gram(s) IV Intermittent once  melatonin 3 milliGRAM(s) Oral once  methylPREDNISolone sodium succinate Injectable 40 milliGRAM(s) IV Push once  potassium chloride    Tablet ER 40 milliEquivalent(s) Oral once  potassium chloride   Powder 40 milliEquivalent(s) Oral Once  potassium chloride   Powder 20 milliEquivalent(s) Oral once  potassium chloride   Powder 40 milliEquivalent(s) Oral once  potassium chloride  20 mEq/100 mL IVPB 20 milliEquivalent(s) IV Intermittent once  potassium chloride  20 mEq/100 mL IVPB 20 milliEquivalent(s) IV Intermittent once  sodium chloride 0.9% Bolus 500 milliLiter(s) IV Bolus once  sodium chloride 0.9% Bolus 250 milliLiter(s) IV Bolus once  vancomycin  IVPB 1000 milliGRAM(s) IV Intermittent Once       Gastroenterology Initial Consult Note      Location: [address] ([address])  Patient Name: ARIAS PUGA  Age: 89y  Gender: Female      Chief Complaint  Patient is a 89y old Female who presents with a chief complaint of sepsis POA, pneumonia, hypokalemia (11 Jul 2024 14:29)  Primary diagnosis of Sepsis  Pneumonia due to infectious organism      Reason for Consult  Dysphagia      History of Present Illness  89 year old female patient known to have a history of AS, HTN, DL, and anxiety who was brought to the ED on 07/04 for evaluation of weakness, difficulty ambulating, and reduced PO intake, found to be hemodynamically stable with evidence of dysphagia on barium swallow. We are consulted for concern of dysphagia.  Summary:  * Presentation: weakness and reduced PO intake since 07/01; reports difficulty swallowing pills since 07/01 associated with episodes of aspirating with water; no globus sensation per daughter; no odynophagia or nausea or vomiting or unintentional weight loss; has daily BMs with no melena or hematochezia or change in habits; no NSAID use  * Hemodynamically stable  * Labs: WBC 17.12 on 07/04 -> 12.57 on 07/11; Hb 8.9 on 07/04 -> 8 on 07/11 (baseline Hb 11 in 2018; no previous iron studies)  * Urinalysis negative on 07/04  * No brain/abdominal imaging performed  * Barium swallow on 07/10 Moderate Oral Dysphagia; Moderate-Severe Pharyngeal Dysphagia; no aspiration -> recommended pureed diet with mildly thickened liquids  * Daughter not sure about prior EGD or Colonoscopy  * No family history of GI malignancies        Prior EGD:  as below      Prior Colonoscopy:  as below      Past Medical and Surgical History:  HTN (hypertension)    Hypothyroid    Mutual's disease    Aortic stenosis    Anemia    Hip fracture  s/p right ORIF        Home Medications:  Home Medications:  atorvastatin 20 mg oral tablet: 1 tab(s) orally once a day (04 Jul 2024 18:49)  CeleXA 20 mg oral tablet: 1 tab(s) orally once a day (18 May 2018 19:31)  D3 50 mcg (2000 intl units) oral capsule: 1 cap(s) orally once a day (04 Jul 2024 18:55)  hydroCHLOROthiazide 12.5 mg oral tablet: 1 tab(s) orally once a day (04 Jul 2024 18:51)  Synthroid 50 mcg (0.05 mg) oral tablet: 1 tab(s) orally once a day (04 Jul 2024 18:52)      Social History:  Tobacco: denies  Alcohol: denies  Drugs: denies      Allergies:  sulfonamides (Unknown)  penicillins (Unknown)      Family History:  no GI cancers        Vital Signs in the last 24 hours   Vitals Summary T(C): 36.7 (07-11-24 @ 14:00), Max: 37.1 (07-11-24 @ 05:00)  HR: 85 (07-11-24 @ 14:00) (84 - 97)  BP: 131/69 (07-11-24 @ 14:00) (107/66 - 131/69)  RR: 18 (07-11-24 @ 14:00) (16 - 20)  SpO2: 99% (07-11-24 @ 11:10) (96% - 99%)  Vent Data   Intake/ Output   07-10-24 @ 07:01  -  07-11-24 @ 07:00  --------------------------------------------------------  IN: 350 mL / OUT: 100 mL / NET: 250 mL        Physical Exam  * General Appearance: lethargic and sleepy, in no acute distress  * Eyes: PERRL, conjunctiva/corneas clear, EOM's intact, fundi benign, both eyes   * Lungs: Good bilateral air entry, normal breath sounds  * Heart: Regular Rate and Rhythm, normal S1 and S2, no audible murmur, rub, or gallop  * Abdomen: Symmetric, non-distended, soft, non-tender, bowel sounds active all four quadrants  * Extremities: no lower extremity pitting edema bilaterally      Investigations   Laboratory Workup      - CBC:                        8.0    12.57 )-----------( 334      ( 11 Jul 2024 08:27 )             25.5       - Hgb Trend:  8.0  07-11-24 @ 08:27  7.9  07-10-24 @ 08:19          - Chemistry:  07-11    139  |  103  |  56<H>  ----------------------------<  145<H>  5.0   |  21  |  1.6<H>    Ca    10.5      11 Jul 2024 08:27  Mg     1.9     07-11    TPro  5.8<L>  /  Alb  2.9<L>  /  TBili  0.7  /  DBili  x   /  AST  29  /  ALT  36  /  AlkPhos  116<H>  07-11    Liver panel trend:  TBili 0.7   /   AST 29   /   ALT 36   /   AlkP 116   /   Tptn 5.8   /   Alb 2.9    /   DBili --      07-11  TBili 0.7   /   AST 30   /   ALT 39   /   AlkP 123   /   Tptn 5.9   /   Alb 2.9    /   DBili --      07-10  TBili 0.6   /   AST 43   /   ALT 52   /   AlkP 101   /   Tptn 5.0   /   Alb 2.7    /   DBili --      07-08  TBili 1.0   /   AST 81   /   ALT 67   /   AlkP 122   /   Tptn 5.6   /   Alb 2.8    /   DBili --      07-06  TBili 1.3   /      /   ALT 76   /   AlkP 119   /   Tptn 5.9   /   Alb 3.1    /   DBili --      07-05  TBili 1.2   /   AST 23   /   ALT 12   /   AlkP 112   /   Tptn 6.6   /   Alb 3.4    /   DBili --      07-04      Microbiological Workup  Urinalysis Basic - ( 11 Jul 2024 08:27 )    Color: x / Appearance: x / SG: x / pH: x  Gluc: 145 mg/dL / Ketone: x  / Bili: x / Urobili: x   Blood: x / Protein: x / Nitrite: x   Leuk Esterase: x / RBC: x / WBC x   Sq Epi: x / Non Sq Epi: x / Bacteria: x          Current Medications  Standing Medications  cefpodoxime 200 milliGRAM(s) Oral every 12 hours  chlorhexidine 2% Cloths 1 Application(s) Topical <User Schedule>  citalopram 20 milliGRAM(s) Oral daily  dextrose 50% Injectable 12.5 Gram(s) IV Push once  doxycycline monohydrate Capsule 100 milliGRAM(s) Oral every 12 hours  heparin   Injectable 5000 Unit(s) SubCutaneous every 8 hours  levothyroxine Injectable 25 MICROGram(s) IV Push <User Schedule>  midodrine 5 milliGRAM(s) Oral every 8 hours  sodium chloride 0.9%. 1000 milliLiter(s) (50 mL/Hr) IV Continuous <Continuous>    PRN Medications  acetaminophen  Suppository .. 650 milliGRAM(s) Rectal every 6 hours PRN Temp greater or equal to 38C (100.4F)  ondansetron Injectable 4 milliGRAM(s) IV Push every 8 hours PRN Nausea and/or Vomiting    Singles Doses Administered  albuterol/ipratropium for Nebulization. 3 milliLiter(s) Nebulizer once  azithromycin  IVPB 500 milliGRAM(s) IV Intermittent Once  cefepime   IVPB 2000 milliGRAM(s) IV Intermittent once  chlorhexidine 2% Cloths 1 Application(s) Topical every 12 hours  chlorhexidine 2% Cloths 1 Application(s) Topical every 12 hours  ethacrynic acid 25 milliGRAM(s) Oral once  lactated ringers Bolus 2000 milliLiter(s) IV Bolus once  magnesium sulfate  IVPB 2 Gram(s) IV Intermittent every 2 hours  magnesium sulfate  IVPB 1 Gram(s) IV Intermittent once  melatonin 3 milliGRAM(s) Oral once  methylPREDNISolone sodium succinate Injectable 40 milliGRAM(s) IV Push once  potassium chloride    Tablet ER 40 milliEquivalent(s) Oral once  potassium chloride   Powder 40 milliEquivalent(s) Oral Once  potassium chloride   Powder 20 milliEquivalent(s) Oral once  potassium chloride   Powder 40 milliEquivalent(s) Oral once  potassium chloride  20 mEq/100 mL IVPB 20 milliEquivalent(s) IV Intermittent once  potassium chloride  20 mEq/100 mL IVPB 20 milliEquivalent(s) IV Intermittent once  sodium chloride 0.9% Bolus 500 milliLiter(s) IV Bolus once  sodium chloride 0.9% Bolus 250 milliLiter(s) IV Bolus once  vancomycin  IVPB 1000 milliGRAM(s) IV Intermittent Once

## 2024-07-11 NOTE — PROGRESS NOTE ADULT - SUBJECTIVE AND OBJECTIVE BOX
89-year-old female, past medical history of aortic stenosis, HTN, HLD, and anxiety brought in by daughter for weakness and difficulty ambulating for the last 4 days.  Up until 4 days ago, patient has been ambulating with a walker.  Patient's daughter also reports patient has decreased appetite and difficulty swallowing,  Patient states she just has no energy. Patient's daughter reports patient fell once while ambulating, but denies any head trauma or LOC. Patient denies fever/chills, chest pain/shortness of breath, abdominal pain, nausea/vomiting/diarrhea, urinary symptoms.    Interval: transferred to floor    PAST MEDICAL & SURGICAL HISTORY:    HTN (hypertension)  Hypothyroid  Flaco's disease  Aortic stenosis  Anemia  Hip fracture  s/p right ORIF    Social History:    non smoker    MEDICATIONS  (STANDING):  cefpodoxime 200 milliGRAM(s) Oral every 12 hours  citalopram 20 milliGRAM(s) Oral daily  dextrose 50% Injectable 12.5 Gram(s) IV Push once  doxycycline monohydrate Capsule 100 milliGRAM(s) Oral every 12 hours  heparin   Injectable 5000 Unit(s) SubCutaneous every 8 hours  levothyroxine Injectable 25 MICROGram(s) IV Push <User Schedule>  melatonin 5 milliGRAM(s) Oral at bedtime  midodrine 5 milliGRAM(s) Oral every 8 hours  norepinephrine Infusion 0.05 MICROgram(s)/kG/Min (5.26 mL/Hr) IV Continuous <Continuous>    MEDICATIONS  (PRN):  acetaminophen  Suppository .. 650 milliGRAM(s) Rectal every 6 hours PRN Temp greater or equal to 38C (100.4F)  ondansetron Injectable 4 milliGRAM(s) IV Push every 8 hours PRN Nausea and/or Vomiting      Allergies    sulfonamides (Unknown)  penicillins (Unknown)    Intolerances    Vital Signs Last 24 Hrs  T(C): 37.1 (11 Jul 2024 05:00), Max: 37.1 (11 Jul 2024 05:00)  T(F): 98.7 (11 Jul 2024 05:00), Max: 98.7 (11 Jul 2024 05:00)  HR: 97 (11 Jul 2024 05:00) (72 - 97)  BP: 112/71 (11 Jul 2024 05:00) (107/66 - 112/71)  BP(mean): --  RR: 16 (11 Jul 2024 05:00) (16 - 20)  SpO2: 98% (11 Jul 2024 05:00) (96% - 98%)    Parameters below as of 11 Jul 2024 05:00  Patient On (Oxygen Delivery Method): nasal cannula  O2 Flow (L/min): 2      Diet, Pureed (07-06-24 @ 10:08) [Active]      PHYSICAL EXAM:  GENERAL: Elderly female, NAD  HEAD:  Atraumatic, Normocephalic  EYES: EOMI, PERRLA, conjunctiva and sclera clear  ENMT: Dry mucous membranes  CHEST/LUNG: Bilateral breath sounds, decreased RLL; No rales, rhonchi or wheezing  HEART: S1,S2 Regular rate and rhythm; No murmurs, rubs, or gallops  ABDOMEN: Soft, nontender, nondistended, +BS  EXTREMITIES: No calf tenderness bilaterally or peripheral edema   NEUROLOGY: non-focal, BUNCH x4, alert &oriented x3    LAB                          7.2    12.26 )-----------( 282      ( 08 Jul 2024 05:58 )             22.3                           8.0    17.99 )-----------( 326      ( 06 Jul 2024 06:05 )             24.7                           8.4    23.61 )-----------( 306      ( 05 Jul 2024 12:26 )             25.2                           8.9    17.12 )-----------( 300      ( 04 Jul 2024 16:20 )             28.0     07-08    133<L>  |  98  |  49<H>  ----------------------------<  98  4.0   |  23  |  1.5    Ca    9.5      08 Jul 2024 05:58  Mg     2.0     07-08    TPro  5.0<L>  /  Alb  2.7<L>  /  TBili  0.6  /  DBili  x   /  AST  43<H>  /  ALT  52<H>  /  AlkPhos  101  07-08      07-06    138  |  99  |  48<H>  ----------------------------<  118<H>  3.3<L>   |  22  |  1.8<H>    Ca    9.9      06 Jul 2024 06:05  Phos  3.3     07-06  Mg     2.9     07-06    TPro  5.6<L>  /  Alb  2.8<L>  /  TBili  1.0  /  DBili  x   /  AST  81<H>  /  ALT  67<H>  /  AlkPhos  122<H>  07-06 07-05    134<L>  |  96<L>  |  44<H>  ----------------------------<  136<H>  3.5   |  23  |  1.8<H>    Ca    9.1      05 Jul 2024 21:33  Phos  3.7     07-05  Mg     1.2     07-05    TPro  5.9<L>  /  Alb  3.1<L>  /  TBili  1.3<H>  /  DBili  x   /  AST  154<H>  /  ALT  76<H>  /  AlkPhos  119<H>  07-05 07-04    138  |  96<L>  |  37<H>  ----------------------------<  141<H>  2.6<LL>   |  26  |  1.4    Ca    9.6      04 Jul 2024 16:20    TPro  6.6  /  Alb  3.4<L>  /  TBili  1.2  /  DBili  x   /  AST  23  /  ALT  12  /  AlkPhos  112  07-04    RAD    ACC: [id number] EXAM:  XR PELVIS AP ONLY 1-2 VIEWS   ORDERED BY: SAUMYA JORDAN     PROCEDURE DATE:  07/04/2024          INTERPRETATION:  CLINICAL HISTORY / REASON FOR EXAM: Pain status post fall    COMPARISON: Pelvis radiograph from December 16, 2018    TECHNIQUE: One view, AP pelvis radiograph    FINDINGS:    Status post left cephalomedullary nail. No acute displaced fracture.   Bilateral hip and lumbar spine degenerative changes. Osteopenia. Surgical   clips overlie the right lower pelvis.      IMPRESSION:    No acute displaced fracture.    --- End of Report ---            KACI STACY MD; Attending Radiologist  This document has been electronically signed. Jul 4 2024  8:53PM

## 2024-07-12 LAB
ANION GAP SERPL CALC-SCNC: 12 MMOL/L — SIGNIFICANT CHANGE UP (ref 7–14)
BLD GP AB SCN SERPL QL: SIGNIFICANT CHANGE UP
BUN SERPL-MCNC: 59 MG/DL — HIGH (ref 10–20)
CALCIUM SERPL-MCNC: 10.4 MG/DL — SIGNIFICANT CHANGE UP (ref 8.4–10.5)
CHLORIDE SERPL-SCNC: 104 MMOL/L — SIGNIFICANT CHANGE UP (ref 98–110)
CO2 SERPL-SCNC: 23 MMOL/L — SIGNIFICANT CHANGE UP (ref 17–32)
CREAT SERPL-MCNC: 1.5 MG/DL — SIGNIFICANT CHANGE UP (ref 0.7–1.5)
EGFR: 32 ML/MIN/1.73M2 — LOW
GLUCOSE SERPL-MCNC: 110 MG/DL — HIGH (ref 70–99)
HCT VFR BLD CALC: 24.5 % — LOW (ref 37–47)
HGB BLD-MCNC: 7.6 G/DL — LOW (ref 12–16)
IRON SATN MFR SERPL: 16 UG/DL — LOW (ref 35–150)
IRON SATN MFR SERPL: 5 % — LOW (ref 15–50)
MCHC RBC-ENTMCNC: 27.2 PG — SIGNIFICANT CHANGE UP (ref 27–31)
MCHC RBC-ENTMCNC: 31 G/DL — LOW (ref 32–37)
MCV RBC AUTO: 87.8 FL — SIGNIFICANT CHANGE UP (ref 81–99)
NRBC # BLD: 0 /100 WBCS — SIGNIFICANT CHANGE UP (ref 0–0)
PLATELET # BLD AUTO: 313 K/UL — SIGNIFICANT CHANGE UP (ref 130–400)
PMV BLD: 9.2 FL — SIGNIFICANT CHANGE UP (ref 7.4–10.4)
POTASSIUM SERPL-MCNC: 4.8 MMOL/L — SIGNIFICANT CHANGE UP (ref 3.5–5)
POTASSIUM SERPL-SCNC: 4.8 MMOL/L — SIGNIFICANT CHANGE UP (ref 3.5–5)
RBC # BLD: 2.79 M/UL — LOW (ref 4.2–5.4)
RBC # FLD: 15.6 % — HIGH (ref 11.5–14.5)
SODIUM SERPL-SCNC: 139 MMOL/L — SIGNIFICANT CHANGE UP (ref 135–146)
TIBC SERPL-MCNC: 294 UG/DL — SIGNIFICANT CHANGE UP (ref 220–430)
TRANSFERRIN SERPL-MCNC: 245 MG/DL — SIGNIFICANT CHANGE UP (ref 200–360)
UIBC SERPL-MCNC: 278 UG/DL — SIGNIFICANT CHANGE UP (ref 110–370)
WBC # BLD: 12.19 K/UL — HIGH (ref 4.8–10.8)
WBC # FLD AUTO: 12.19 K/UL — HIGH (ref 4.8–10.8)

## 2024-07-12 PROCEDURE — 93971 EXTREMITY STUDY: CPT | Mod: 26,LT

## 2024-07-12 RX ORDER — FERROUS SULFATE 325(65) MG
325 TABLET ORAL ONCE
Refills: 0 | Status: COMPLETED | OUTPATIENT
Start: 2024-07-12 | End: 2024-07-12

## 2024-07-12 RX ORDER — PANTOPRAZOLE SODIUM 40 MG/10ML
1 INJECTION, POWDER, FOR SOLUTION INTRAVENOUS
Qty: 0 | Refills: 0 | DISCHARGE
Start: 2024-07-12

## 2024-07-12 RX ORDER — IRON SUCROSE 20 MG/ML
100 INJECTION, SOLUTION INTRAVENOUS ONCE
Refills: 0 | Status: DISCONTINUED | OUTPATIENT
Start: 2024-07-12 | End: 2024-07-12

## 2024-07-12 RX ORDER — FERROUS SULFATE 325(65) MG
325 TABLET ORAL DAILY
Refills: 0 | Status: DISCONTINUED | OUTPATIENT
Start: 2024-07-12 | End: 2024-07-20

## 2024-07-12 RX ADMIN — MIDODRINE HYDROCHLORIDE 5 MILLIGRAM(S): 10 TABLET ORAL at 13:15

## 2024-07-12 RX ADMIN — HEPARIN SODIUM 5000 UNIT(S): 50 INJECTION, SOLUTION INTRAVENOUS at 15:45

## 2024-07-12 RX ADMIN — Medication 200 MILLIGRAM(S): at 17:50

## 2024-07-12 RX ADMIN — Medication 325 MILLIGRAM(S): at 17:50

## 2024-07-12 RX ADMIN — Medication 25 MICROGRAM(S): at 06:24

## 2024-07-12 RX ADMIN — DOXYCYCLINE 100 MILLIGRAM(S): 100 CAPSULE ORAL at 17:50

## 2024-07-12 RX ADMIN — DOXYCYCLINE 100 MILLIGRAM(S): 100 CAPSULE ORAL at 06:24

## 2024-07-12 RX ADMIN — HEPARIN SODIUM 5000 UNIT(S): 50 INJECTION, SOLUTION INTRAVENOUS at 23:38

## 2024-07-12 RX ADMIN — Medication 1 APPLICATION(S): at 06:25

## 2024-07-12 RX ADMIN — HEPARIN SODIUM 5000 UNIT(S): 50 INJECTION, SOLUTION INTRAVENOUS at 06:24

## 2024-07-12 RX ADMIN — PANTOPRAZOLE SODIUM 40 MILLIGRAM(S): 40 INJECTION, POWDER, FOR SOLUTION INTRAVENOUS at 06:25

## 2024-07-12 RX ADMIN — CITALOPRAM 20 MILLIGRAM(S): 10 TABLET, FILM COATED ORAL at 15:49

## 2024-07-12 RX ADMIN — Medication 200 MILLIGRAM(S): at 06:25

## 2024-07-12 RX ADMIN — MIDODRINE HYDROCHLORIDE 5 MILLIGRAM(S): 10 TABLET ORAL at 06:25

## 2024-07-12 NOTE — PROGRESS NOTE ADULT - ASSESSMENT
This is a 89-year-old female, past medical history of aortic stenosis, HTN, HLD, and anxiety brought in by daughter for weakness and difficulty ambulating for the last 4 days.    IMPRESSION  #Acute hypoxic respiratory failure. Concern for Community acquired pneumonia  #Shock- Likely cardiogenic, Severe aortic stenosis. Heart failure with reduced EF- Improved.  #Significant Dysphagia   #MASSIMO over CKD- US kidney bladder normal.  #Transaminitis-Improved.   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  MRSA Nares negative    RECOMMENDATIONS  -Blood cultures negative.   -Kept on IV ceftriaxone 1 gram q 24 hours and doxycycline 100mg BID PO/IV, now switched to PO vantin 200mg BID and Doxy 100mg BID for total of 7 days. (E/D 7/11/24)   -Can consider stopping antibiotics today.  -GI evaluation noted.   -Guarded prognosis. Goals of care. Off loading to prevent pressure sores and preventive measures to avoid aspiration.   -Patient will benefit from PCV 20 and RSV vaccines in 1-2 months.     If any questions, please send a message or call on [employer] Teams  Please continue to update ID with any pertinent new laboratory or radiographic findings.    Demario Gill M.D  Infectious Diseases Attending/   [employer] [facility] [place]

## 2024-07-12 NOTE — PHARMACOTHERAPY INTERVENTION NOTE - COMMENTS
Consistent with ID note, recommended to discontinue doxycycline order since today is the last day of therapy.    Darrell Anguiano, PharmD, BCIDP  Clinical Pharmacy Specialist, Infectious Diseases  Tele-Antimicrobial Stewardship Program (Tele-ASP)  Tele-ASP Phone: [phone]  
Consistent with ID note, recommended to discontinue cefpodoxime order since today is the last day of therapy.    Darrell Anguiano, PharmD, [facility]  Clinical Pharmacy Specialist, Infectious Diseases  Tele-Antimicrobial Stewardship Program (Tele-ASP)  Tele-ASP Phone: [phone]

## 2024-07-12 NOTE — PROGRESS NOTE ADULT - SUBJECTIVE AND OBJECTIVE BOX
ARIAS PUGA  89y, Female    LOS  8d    INTERVAL EVENTS/HPI  - No acute events overnight  - T(F): , Max: 98.7 (07-12-24 @ 05:00)  - WBC Count: 12.19 (07-12-24 @ 06:41)  WBC Count: 12.57 (07-11-24 @ 08:27)  - Creatinine: 1.5 (07-12-24 @ 06:41)  Creatinine: 1.6 (07-11-24 @ 08:27)    REVIEW OF SYSTEMS:  CONSTITUTIONAL: No fever or chills  HEENT: No sore throat  RESPIRATORY: No cough, no shortness of breath  CARDIOVASCULAR: No chest pain or palpitations  GASTROINTESTINAL: No abdominal or epigastric pain  GENITOURINARY: No dysuria  NEUROLOGICAL: No headache/dizziness  MSK: No joint pain, erythema, or swelling; no back pain  SKIN: No itching, rashes  All other ROS negative except noted above    Prior hospital charts reviewed [Yes]  Primary team notes reviewed [Yes]  Other consultant notes reviewed [Yes]    ANTIMICROBIALS:   cefpodoxime 200 every 12 hours  doxycycline monohydrate Capsule 100 every 12 hours      OTHER MEDS: MEDICATIONS  (STANDING):  acetaminophen  Suppository .. 650 every 6 hours PRN  citalopram 20 daily  dextrose 50% Injectable 12.5 once  heparin   Injectable 5000 every 8 hours  levothyroxine Injectable 25 <User Schedule>  midodrine 5 every 8 hours  ondansetron Injectable 4 every 8 hours PRN  pantoprazole    Tablet 40 before breakfast      Vital Signs Last 24 Hrs  T(F): 98.7 (07-12-24 @ 05:00), Max: 99.6 (07-05-24 @ 17:30)    Vital Signs Last 24 Hrs  HR: 80 (07-12-24 @ 05:00) (78 - 85)  BP: 125/70 (07-12-24 @ 05:00) (113/66 - 131/69)  RR: 18 (07-12-24 @ 05:00)  SpO2: 98% (07-12-24 @ 05:00) (97% - 99%)  Wt(kg): --    EXAM:  GENERAL: NAD, Frail looking. on NC   HEAD: No head lesions  NECK: Supple  CHEST/LUNG: Shallow breath sounds.   HEART: S1 S2  ABDOMEN: Soft, nontender  EXTREMITIES: No clubbing  NERVOUS SYSTEM: Somnolent.   MSK: No joint erythema, swelling or pain  SKIN: No rashes or lesions, no superficial thrombophlebitis  Labs:                        7.6    12.19 )-----------( 313      ( 12 Jul 2024 06:41 )             24.5     07-12    139  |  104  |  59<H>  ----------------------------<  110<H>  4.8   |  23  |  1.5    Ca    10.4      12 Jul 2024 06:41  Mg     1.9     07-11    TPro  5.8<L>  /  Alb  2.9<L>  /  TBili  0.7  /  DBili  x   /  AST  29  /  ALT  36  /  AlkPhos  116<H>  07-11      WBC Trend:  WBC Count: 12.19 (07-12-24 @ 06:41)  WBC Count: 12.57 (07-11-24 @ 08:27)  WBC Count: 15.85 (07-10-24 @ 08:19)  WBC Count: 12.26 (07-08-24 @ 05:58)      Creatine Trend:  Creatinine: 1.5 (07-12)  Creatinine: 1.6 (07-11)  Creatinine: 1.4 (07-10)  Creatinine: 1.5 (07-08)      Liver Biochemical Testing Trend:  Alanine Aminotransferase (ALT/SGPT): 36 (07-11)  Alanine Aminotransferase (ALT/SGPT): 39 (07-10)  Alanine Aminotransferase (ALT/SGPT): 52 *H* (07-08)  Alanine Aminotransferase (ALT/SGPT): 67 *H* (07-06)  Alanine Aminotransferase (ALT/SGPT): 76 *H* (07-05)  Aspartate Aminotransferase (AST/SGOT): 29 (07-11-24 @ 08:27)  Aspartate Aminotransferase (AST/SGOT): 30 (07-10-24 @ 08:19)  Aspartate Aminotransferase (AST/SGOT): 43 (07-08-24 @ 05:58)  Aspartate Aminotransferase (AST/SGOT): 81 (07-06-24 @ 06:05)  Aspartate Aminotransferase (AST/SGOT): 154 (07-05-24 @ 11:22)  Bilirubin Total: 0.7 (07-11)  Bilirubin Total: 0.7 (07-10)  Bilirubin Total: 0.6 (07-08)  Bilirubin Total: 1.0 (07-06)  Bilirubin Total: 1.3 (07-05)      Trend LDH      Urinalysis Basic - ( 12 Jul 2024 06:41 )    Color: x / Appearance: x / SG: x / pH: x  Gluc: 110 mg/dL / Ketone: x  / Bili: x / Urobili: x   Blood: x / Protein: x / Nitrite: x   Leuk Esterase: x / RBC: x / WBC x   Sq Epi: x / Non Sq Epi: x / Bacteria: x        MICROBIOLOGY:  Vancomycin Level, Trough: 11.4 (07-06 @ 15:00)    Female    Urinalysis with Rflx Culture (collected 04 Jul 2024 17:55)    Culture - Blood (collected 04 Jul 2024 16:20)  Source: .Blood Blood-Peripheral  Final Report:    No growth at 5 days    Culture - Blood (collected 04 Jul 2024 16:20)  Source: .Blood Blood-Peripheral  Final Report:    No growth at 5 days    D-Dimer Assay, Quantitative: 532 (07-08)                RADIOLOGY & ADDITIONAL TESTS:  I have personally reviewed the relevant images.   CXR      CT  < from: [facility] Duplex Lower Ext Vein Scan, Bilat (07.08.24 @ 12:24) >    IMPRESSION:  No evidence of deep venous thrombosis in either lower extremity. Left   Baker's cyst.    < end of copied text >        WEIGHT  Weight (kg): 59.647 (07-09-24 @ 02:33)  Creatinine: 1.5 mg/dL (07-12-24 @ 06:41)      All available historical records have been reviewed

## 2024-07-12 NOTE — DISCHARGE NOTE PROVIDER - NSDCCPCAREPLAN_GEN_ALL_CORE_FT
PRINCIPAL DISCHARGE DIAGNOSIS  Diagnosis: Pneumonia  Assessment and Plan of Treatment: you were treated with antibiotics and finished the course      SECONDARY DISCHARGE DIAGNOSES  Diagnosis: Sepsis  Assessment and Plan of Treatment: secondary to pneumonia  treated and resolved    Diagnosis: Hypokalemia  Assessment and Plan of Treatment: supplemented    Diagnosis: Dysphagia  Assessment and Plan of Treatment: - Speech and swallow evaluation appreciated: s/p barium swallow on 07/10 (Moderate Oral Dysphagia; Moderate-Severe Pharyngeal Dysphagia; no aspiration) -> recommended pureed diet with mildly thickened liquids  - GI was consulted: In the setting chronic anemia and dysphagia, patient would ideally benefit from an EGD and colonoscopy. After discussion of goals of care and plan with daughter (HCP Ms Fox) at bedside, and in the setting of history of aortic stenosis and old, decision was made to hold off endoscopic evaluation for now.    - please follow up with our [facility] located at [address] [place] [address]  Phone Number: [phone]         PRINCIPAL DISCHARGE DIAGNOSIS  Diagnosis: Pneumonia  Assessment and Plan of Treatment: you were treated with antibiotics and finished the course      SECONDARY DISCHARGE DIAGNOSES  Diagnosis: Sepsis  Assessment and Plan of Treatment: secondary to pneumonia  treated and resolved    Diagnosis: Hypokalemia  Assessment and Plan of Treatment: supplemented    Diagnosis: Dysphagia  Assessment and Plan of Treatment: - Speech and swallow evaluation appreciated: s/p barium swallow on 07/10 (Moderate Oral Dysphagia; Moderate-Severe Pharyngeal Dysphagia; no aspiration) -> recommended pureed diet with mildly thickened liquids

## 2024-07-12 NOTE — DISCHARGE NOTE PROVIDER - NSDCMRMEDTOKEN_GEN_ALL_CORE_FT
atorvastatin 20 mg oral tablet: 1 tab(s) orally once a day  CeleXA 20 mg oral tablet: 1 tab(s) orally once a day  D3 50 mcg (2000 intl units) oral capsule: 1 cap(s) orally once a day  ferrous sulfate 325 mg (65 mg elemental iron) oral tablet: 1 tab(s) orally once a day  hydroCHLOROthiazide 12.5 mg oral tablet: 1 tab(s) orally once a day  pantoprazole 40 mg oral delayed release tablet: 1 tab(s) orally once a day (before a meal)  Synthroid 50 mcg (0.05 mg) oral tablet: 1 tab(s) orally once a day   morphine 20 mg/mL oral concentrate: 0.25 milliliter(s) orally every 4 hours as needed for Severe Pain (7 - 10) MDD: 1.5ml

## 2024-07-12 NOTE — DISCHARGE NOTE PROVIDER - CARE PROVIDER_API CALL
Gm Islas.  Internal Medicine  [address] [facility] [facility], [facility] [address]  [place] [address]  Phone: [phone]  Fax: [phone]  Follow Up Time: 1 week

## 2024-07-12 NOTE — DISCHARGE NOTE PROVIDER - HOSPITAL COURSE
89-year-old female, past medical history of aortic stenosis, HTN, HLD, and anxiety brought in by daughter for weakness and difficulty ambulating for the last 4 days.  Up until 4 days ago, patient has been ambulating with a walker.  Patient's daughter also reports patient has decreased appetite and difficulty swallowing,  Patient states she just has no energy. Patient's daughter reports patient fell once while ambulating, but denies any head trauma or LOC. Patient denies fever/chills, chest pain/shortness of breath, abdominal pain, nausea/vomiting/diarrhea, urinary symptoms.      Pt was admitted to medicine for Sepsis POA 2/2 pneumonia   - finished the course of vantin and doxycycline  - ID consulted  - IVF hydration  -Pain control and antipyretics prn   -Supplemental O2 prn     #Elevated troponin  -Patient denies CP  -EKG: Sinus rhythm with PAC, LVH,      # Dysphagia   - Speech consulted  - s/p MBS: Mod-severe pharyngeal dysphagia for thins. Mild pharyngeal dysphagia for mildly thick, mod thick, and solids.  - GI consulted: After discussion of goals of care and plan with daughter (HCP Ms Fox) at bedside, and in the setting of history of aortic stenosis and old, decision was made to hold off endoscopic evaluation for now    # Iron deficiency anemia  - s/p IV iron x 1 and start po upon dc    # Left hand edema  - likely 2/2 infiltrated IV  - venous doppler  - keep extremity elevated, warm compress    #Hypokalemia  - supplemented    #Aortic stenosis   -F/U echo  -Echo 2018: normal LV function, G1DD, mild MR/AR, severe AS     #Hypothyroidism  -Synthroid 50 mcg      #HTN/HLD  -holding statin and HCTZ - resume once cleared by speech and swallow     Pt is medically stable for [facility] to [facility]     89-year-old female, past medical history of aortic stenosis, HTN, HLD, and anxiety brought in by daughter for weakness and difficulty ambulating for the last 4 days.  Up until 4 days ago, patient has been ambulating with a walker.  Patient's daughter also reports patient has decreased appetite and difficulty swallowing,  Patient states she just has no energy. Patient's daughter reports patient fell once while ambulating, but denies any head trauma or LOC. Patient denies fever/chills, chest pain/shortness of breath, abdominal pain, nausea/vomiting/diarrhea, urinary symptoms.      Pt was admitted to medicine for Sepsis POA 2/2 pneumonia   - finished the course of vantin and doxycycline  - ID consulted  - IVF hydration  -Pain control and antipyretics prn   -Supplemental O2 prn     #Elevated troponin  -Patient denies CP  -EKG: Sinus rhythm with PAC, LVH,      # Dysphagia   - Speech consulted  - s/p MBS: Mod-severe pharyngeal dysphagia for thins. Mild pharyngeal dysphagia for mildly thick, mod thick, and solids.  - GI consulted: After discussion of goals of care and plan with daughter (HCP Ms Fox) at bedside, and in the setting of history of aortic stenosis and old, decision was made to hold off endoscopic evaluation for now    # Iron deficiency anemia  - s/p IV iron x 1 and start po upon dc    # Left hand edema  - likely 2/2 infiltrated IV  - venous doppler negative for DVT  - keep extremity elevated, warm compress    #Hypokalemia  - supplemented    #Aortic stenosis   -F/U echo  -Echo 2018: normal LV function, G1DD, mild MR/AR, severe AS     #Hypothyroidism  -Synthroid 50 mcg      #HTN/HLD  -continue home medications    Pt is medically stable for dc to [facility]       FROM ADMISSION H+P:   HPI:  This is a 89-year-old female, past medical history of aortic stenosis, HTN, HLD, and anxiety brought in by daughter for weakness and difficulty ambulating for the last 4 days.  Up until 4 days ago, patient has been ambulating with a walker.  Patient's daughter also reports patient has decreased appetite and difficulty swallowing,  Patient states she just has no energy. Patient's daughter reports patient fell once while ambulating, but denies any head trauma or LOC. Patient denies fever/chills, chest pain/shortness of breath, abdominal pain, nausea/vomiting/diarrhea, urinary symptoms.   (04 Jul 2024 18:41)      HOSPITAL COURSE:     Pt was admitted to medicine for Sepsis POA 2/2 pneumonia   - finished the course of vantin and doxycycline      palliative care consulted on 7/15 for GOC and per d/w pt's family; please refer to pt's medical records regarding details of this hospital course.    DNR/DNI and comfort measure only protocol implemented placed  hospice consulted; home w/ hospice in plan as agreed by pt's daughter Dominique      d/w pt's daughter Dominique on day of dc, with pt's daughter's verbalizing agreement w/ respective plan. Roxanol [facility] 7/19, with pt's daughter   reporting rx has already been picked up.         pt's hospital course and discharge for today to home w/ home hospice discussed for approval w/ Dr. Islas        Vital Signs Last 24 Hrs  T(C): 36.4 (20 Jul 2024 05:18), Max: 36.8 (19 Jul 2024 20:50)  T(F): 97.5 (20 Jul 2024 05:18), Max: 98.3 (19 Jul 2024 20:50)  HR: 107 (20 Jul 2024 05:18) (102 - 107)  BP: 116/58 (20 Jul 2024 05:18) (116/58 - 124/73)  BP(mean): --  RR: 16 (20 Jul 2024 05:18) (16 - 16)  SpO2: 95% (20 Jul 2024 05:18) (94% - 96%)    Parameters below as of 20 Jul 2024 05:18  Patient On (Oxygen Delivery Method): nasal cannula  O2 Flow (L/min): 2

## 2024-07-13 LAB
ANION GAP SERPL CALC-SCNC: 15 MMOL/L — HIGH (ref 7–14)
BUN SERPL-MCNC: 68 MG/DL — CRITICAL HIGH (ref 10–20)
CALCIUM SERPL-MCNC: 10.8 MG/DL — HIGH (ref 8.4–10.5)
CHLORIDE SERPL-SCNC: 105 MMOL/L — SIGNIFICANT CHANGE UP (ref 98–110)
CO2 SERPL-SCNC: 22 MMOL/L — SIGNIFICANT CHANGE UP (ref 17–32)
CREAT SERPL-MCNC: 1.6 MG/DL — HIGH (ref 0.7–1.5)
EGFR: 30 ML/MIN/1.73M2 — LOW
FERRITIN SERPL-MCNC: 73 NG/ML — SIGNIFICANT CHANGE UP (ref 13–330)
GLUCOSE SERPL-MCNC: 93 MG/DL — SIGNIFICANT CHANGE UP (ref 70–99)
HCT VFR BLD CALC: 27.5 % — LOW (ref 37–47)
HGB BLD-MCNC: 8.4 G/DL — LOW (ref 12–16)
MCHC RBC-ENTMCNC: 26.8 PG — LOW (ref 27–31)
MCHC RBC-ENTMCNC: 30.5 G/DL — LOW (ref 32–37)
MCV RBC AUTO: 87.9 FL — SIGNIFICANT CHANGE UP (ref 81–99)
NRBC # BLD: 0 /100 WBCS — SIGNIFICANT CHANGE UP (ref 0–0)
PLATELET # BLD AUTO: 309 K/UL — SIGNIFICANT CHANGE UP (ref 130–400)
PMV BLD: 9.2 FL — SIGNIFICANT CHANGE UP (ref 7.4–10.4)
POTASSIUM SERPL-MCNC: 5 MMOL/L — SIGNIFICANT CHANGE UP (ref 3.5–5)
POTASSIUM SERPL-SCNC: 5 MMOL/L — SIGNIFICANT CHANGE UP (ref 3.5–5)
RBC # BLD: 3.13 M/UL — LOW (ref 4.2–5.4)
RBC # FLD: 15.8 % — HIGH (ref 11.5–14.5)
SODIUM SERPL-SCNC: 142 MMOL/L — SIGNIFICANT CHANGE UP (ref 135–146)
WBC # BLD: 13.44 K/UL — HIGH (ref 4.8–10.8)
WBC # FLD AUTO: 13.44 K/UL — HIGH (ref 4.8–10.8)

## 2024-07-13 PROCEDURE — 71045 X-RAY EXAM CHEST 1 VIEW: CPT | Mod: 26

## 2024-07-13 RX ORDER — PANTOPRAZOLE SODIUM 40 MG/10ML
40 INJECTION, POWDER, FOR SOLUTION INTRAVENOUS DAILY
Refills: 0 | Status: DISCONTINUED | OUTPATIENT
Start: 2024-07-13 | End: 2024-07-20

## 2024-07-13 RX ORDER — KETOROLAC TROMETHAMINE 30 MG/ML
15 INJECTION, SOLUTION INTRAMUSCULAR ONCE
Refills: 0 | Status: DISCONTINUED | OUTPATIENT
Start: 2024-07-13 | End: 2024-07-13

## 2024-07-13 RX ORDER — CEFEPIME 1 G/1
1000 INJECTION, POWDER, FOR SOLUTION INTRAMUSCULAR; INTRAVENOUS EVERY 12 HOURS
Refills: 0 | Status: DISCONTINUED | OUTPATIENT
Start: 2024-07-13 | End: 2024-07-17

## 2024-07-13 RX ADMIN — Medication 50 MILLILITER(S): at 08:57

## 2024-07-13 RX ADMIN — Medication 325 MILLIGRAM(S): at 12:08

## 2024-07-13 RX ADMIN — Medication 25 MICROGRAM(S): at 06:09

## 2024-07-13 RX ADMIN — KETOROLAC TROMETHAMINE 15 MILLIGRAM(S): 30 INJECTION, SOLUTION INTRAMUSCULAR at 08:57

## 2024-07-13 RX ADMIN — Medication 50 MILLILITER(S): at 22:24

## 2024-07-13 RX ADMIN — HEPARIN SODIUM 5000 UNIT(S): 50 INJECTION, SOLUTION INTRAVENOUS at 22:15

## 2024-07-13 RX ADMIN — KETOROLAC TROMETHAMINE 15 MILLIGRAM(S): 30 INJECTION, SOLUTION INTRAMUSCULAR at 14:08

## 2024-07-13 RX ADMIN — PANTOPRAZOLE SODIUM 40 MILLIGRAM(S): 40 INJECTION, POWDER, FOR SOLUTION INTRAVENOUS at 12:08

## 2024-07-13 RX ADMIN — CITALOPRAM 20 MILLIGRAM(S): 10 TABLET, FILM COATED ORAL at 14:08

## 2024-07-13 RX ADMIN — Medication 50 MILLILITER(S): at 09:04

## 2024-07-13 RX ADMIN — PANTOPRAZOLE SODIUM 40 MILLIGRAM(S): 40 INJECTION, POWDER, FOR SOLUTION INTRAVENOUS at 06:09

## 2024-07-13 RX ADMIN — Medication 1 APPLICATION(S): at 06:09

## 2024-07-13 RX ADMIN — Medication 50 MILLILITER(S): at 14:08

## 2024-07-13 RX ADMIN — MIDODRINE HYDROCHLORIDE 5 MILLIGRAM(S): 10 TABLET ORAL at 06:09

## 2024-07-13 RX ADMIN — Medication 50 MILLILITER(S): at 06:09

## 2024-07-13 RX ADMIN — HEPARIN SODIUM 5000 UNIT(S): 50 INJECTION, SOLUTION INTRAVENOUS at 14:08

## 2024-07-13 RX ADMIN — MIDODRINE HYDROCHLORIDE 5 MILLIGRAM(S): 10 TABLET ORAL at 22:15

## 2024-07-13 RX ADMIN — Medication 50 MILLILITER(S): at 18:02

## 2024-07-13 RX ADMIN — Medication 50 MILLILITER(S): at 12:08

## 2024-07-13 RX ADMIN — Medication 50 MILLILITER(S): at 22:16

## 2024-07-13 RX ADMIN — HEPARIN SODIUM 5000 UNIT(S): 50 INJECTION, SOLUTION INTRAVENOUS at 06:09

## 2024-07-13 RX ADMIN — CEFEPIME 100 MILLIGRAM(S): 1 INJECTION, POWDER, FOR SOLUTION INTRAMUSCULAR; INTRAVENOUS at 19:56

## 2024-07-13 RX ADMIN — MIDODRINE HYDROCHLORIDE 5 MILLIGRAM(S): 10 TABLET ORAL at 14:08

## 2024-07-13 NOTE — CHART NOTE - NSCHARTNOTEFT_GEN_A_CORE
CXR was ordered earlier today as per family request.     Xray Chest 1 View-PORTABLE IMMEDIATE (Xray Chest 1 View-PORTABLE IMMEDIATE .) (07.13.24 @ 10:56)   Impression:  Right opacity/pleural effusion, decreased. Left opacity/pleural effusion,   increased. Stable cardiomegaly  QUETA ONEAL MD; Attending Radiologist  This document has been electronically signed. Jul 13 2024 11:09AM    - Findings were discussed with ID who recommended start pt on Cefepime for now  - Will order calorie count given poor po intake  - Palliative consult  - Continue IVF hydration  - AM labs    Results of imaging/labs and pt's plan were discussed with pt's family multiple times throughout the day.   All questions answered.       Case was also discussed with Dr. Islas

## 2024-07-13 NOTE — SWALLOW BEDSIDE ASSESSMENT ADULT - NS SPL SWALLOW CLINIC TRIAL FT
Daughter reported that pt was coughing on mild thick and had given her mod thick. Pt no overts today for mild thick. Also daughter reports pt had ice cream yesterday and was educated on the fact that ice cream is a thin liquid and pt is having overts on thin liquids.

## 2024-07-13 NOTE — PROGRESS NOTE ADULT - SUBJECTIVE AND OBJECTIVE BOX
89-year-old female, past medical history of aortic stenosis, HTN, HLD, and anxiety brought in by daughter for weakness and difficulty ambulating for the last 4 days.  Up until 4 days ago, patient has been ambulating with a walker.  Patient's daughter also reports patient has decreased appetite and difficulty swallowing,  Patient states she just has no energy. Patient's daughter reports patient fell once while ambulating, but denies any head trauma or LOC. Patient denies fever/chills, chest pain/shortness of breath, abdominal pain, nausea/vomiting/diarrhea, urinary symptoms.    Interval: completed abx, weak lethargic discussed GOC yesterday    PAST MEDICAL & SURGICAL HISTORY:    HTN (hypertension)  Hypothyroid  Elberfeld's disease  Aortic stenosis  Anemia  Hip fracture  s/p right ORIF    Social History:    non smoker    MEDICATIONS  (STANDING):  chlorhexidine 2% Cloths 1 Application(s) Topical <User Schedule>  citalopram 20 milliGRAM(s) Oral daily  dextrose 50% Injectable 12.5 Gram(s) IV Push once  ferrous    sulfate 325 milliGRAM(s) Oral daily  heparin   Injectable 5000 Unit(s) SubCutaneous every 8 hours  levothyroxine Injectable 25 MICROGram(s) IV Push <User Schedule>  midodrine 5 milliGRAM(s) Oral every 8 hours  pantoprazole    Tablet 40 milliGRAM(s) Oral before breakfast  sodium chloride 0.9%. 1000 milliLiter(s) (50 mL/Hr) IV Continuous <Continuous>    MEDICATIONS  (PRN):  acetaminophen  Suppository .. 650 milliGRAM(s) Rectal every 6 hours PRN Temp greater or equal to 38C (100.4F)  ondansetron Injectable 4 milliGRAM(s) IV Push every 8 hours PRN Nausea and/or Vomiting    Allergies    sulfonamides (Unknown)  penicillins (Unknown)    Intolerances    Vital Signs Last 24 Hrs  T(C): 36.8 (13 Jul 2024 04:54), Max: 36.9 (12 Jul 2024 20:22)  T(F): 98.2 (13 Jul 2024 04:54), Max: 98.5 (12 Jul 2024 20:22)  HR: 80 (13 Jul 2024 04:54) (80 - 86)  BP: 132/72 (13 Jul 2024 04:54) (121/61 - 132/72)  BP(mean): --  RR: 18 (13 Jul 2024 04:54) (16 - 18)  SpO2: 97% (13 Jul 2024 04:54) (95% - 97%)    Parameters below as of 13 Jul 2024 04:54  Patient On (Oxygen Delivery Method): nasal cannula    Diet, Pureed:   Mildly Thick Liquids (MILDTHICKLIQS) (07-09-24 @ 11:07) [Active]      PHYSICAL EXAM:  GENERAL: Elderly female, NAD  HEAD:  Atraumatic, Normocephalic  EYES: EOMI, PERRLA, conjunctiva and sclera clear  ENMT: Dry mucous membranes  CHEST/LUNG: Bilateral breath sounds, decreased RLL; No rales, rhonchi or wheezing  HEART: S1,S2 Regular rate and rhythm; No murmurs, rubs, or gallops  ABDOMEN: Soft, nontender, nondistended, +BS  EXTREMITIES: No calf tenderness bilaterally or peripheral edema   NEUROLOGY: non-focal, BUCNH x4, alert &oriented x3    LAB                          7.6    12.19 )-----------( 313      ( 12 Jul 2024 06:41 )             24.5                                 8.4    23.61 )-----------( 306      ( 05 Jul 2024 12:26 )             25.2                           8.9    17.12 )-----------( 300      ( 04 Jul 2024 16:20 )             28.0     07-12    139  |  104  |  59<H>  ----------------------------<  110<H>  4.8   |  23  |  1.5    Ca    10.4      12 Jul 2024 06:41  Mg     1.9     07-11    TPro  5.8<L>  /  Alb  2.9<L>  /  TBili  0.7  /  DBili  x   /  AST  29  /  ALT  36  /  AlkPhos  116<H>  07-11      07-08    133<L>  |  98  |  49<H>  ----------------------------<  98  4.0   |  23  |  1.5    Ca    9.5      08 Jul 2024 05:58  Mg     2.0     07-08    TPro  5.0<L>  /  Alb  2.7<L>  /  TBili  0.6  /  DBili  x   /  AST  43<H>  /  ALT  52<H>  /  AlkPhos  101  07-08 07-06    138  |  99  |  48<H>  ----------------------------<  118<H>  3.3<L>   |  22  |  1.8<H>    Ca    9.9      06 Jul 2024 06:05  Phos  3.3     07-06  Mg     2.9     07-06    TPro  5.6<L>  /  Alb  2.8<L>  /  TBili  1.0  /  DBili  x   /  AST  81<H>  /  ALT  67<H>  /  AlkPhos  122<H>  07-06 07-05    134<L>  |  96<L>  |  44<H>  ----------------------------<  136<H>  3.5   |  23  |  1.8<H>    Ca    9.1      05 Jul 2024 21:33  Phos  3.7     07-05  Mg     1.2     07-05    TPro  5.9<L>  /  Alb  3.1<L>  /  TBili  1.3<H>  /  DBili  x   /  AST  154<H>  /  ALT  76<H>  /  AlkPhos  119<H>  07-05 07-04    138  |  96<L>  |  37<H>  ----------------------------<  141<H>  2.6<LL>   |  26  |  1.4    Ca    9.6      04 Jul 2024 16:20    TPro  6.6  /  Alb  3.4<L>  /  TBili  1.2  /  DBili  x   /  AST  23  /  ALT  12  /  AlkPhos  112  07-04    RAD    ACC: [id number] EXAM:  XR PELVIS AP ONLY 1-2 VIEWS   ORDERED BY: SAUMYA JORDAN     PROCEDURE DATE:  07/04/2024          INTERPRETATION:  CLINICAL HISTORY / REASON FOR EXAM: Pain status post fall    COMPARISON: Pelvis radiograph from December 16, 2018    TECHNIQUE: One view, AP pelvis radiograph    FINDINGS:    Status post left cephalomedullary nail. No acute displaced fracture.   Bilateral hip and lumbar spine degenerative changes. Osteopenia. Surgical   clips overlie the right lower pelvis.      IMPRESSION:    No acute displaced fracture.    --- End of Report ---            KACI STACY MD; Attending Radiologist  This document has been electronically signed. Jul 4 2024  8:53PM

## 2024-07-13 NOTE — SWALLOW BEDSIDE ASSESSMENT ADULT - COMMENTS
here with pneumonia  [facility] [place] recommendations for puree and mildly thick liquids

## 2024-07-13 NOTE — PROGRESS NOTE ADULT - ASSESSMENT
89-year-old female, past medical history of aortic stenosis, HTN, HLD, and anxiety brought in by daughter for weakness and difficulty ambulating for the last 4 days.  Up until 4 days ago, patient has been ambulating with a walker.  Patient's daughter also reports patient has decreased appetite and difficulty swallowing,  Patient states she just has no energy. Patient's daughter reports patient fell once while ambulating, but denies any head trauma or LOC. Patient denies fever/chills, chest pain/shortness of breath, abdominal pain, nausea/vomiting/diarrhea, urinary symptoms.      #Sepsis POA 2/2 pneumonia     -completed abx  -improved WBC   -Supplemental O2 prn   -Monitor I&O    # Poor intake / Dysphagia     - tired   - reviewed GI   - encourage intake   - on IV        #Aortic stenosis   -F/U echo  -Echo 2018: normal LV function, G1DD, mild MR/AR, severe AS     #Hypothyroidism  -Synthroid 50 mcg      #HTN/HLD  -holding statin and HCTZ - resume once cleared by speech and swallow     #Anemia (Baseline Hgb 9-11)  -Continue to monitor     #Diet:    #DVT ppx: HSQ   #Activity: OOB with assistance   #ADVANCE CARE GO  #DISPOSITION:  D/C planning

## 2024-07-13 NOTE — SWALLOW BEDSIDE ASSESSMENT ADULT - COMMENTS
+ toleration observed without overt symptoms of penetration/aspiration for puree w mildly thick liquids here with pneumonia  [facility] [place] recommendations for puree and mildly thick liquids

## 2024-07-14 LAB
ANION GAP SERPL CALC-SCNC: 12 MMOL/L — SIGNIFICANT CHANGE UP (ref 7–14)
BUN SERPL-MCNC: 69 MG/DL — CRITICAL HIGH (ref 10–20)
CALCIUM SERPL-MCNC: 10.3 MG/DL — SIGNIFICANT CHANGE UP (ref 8.4–10.5)
CHLORIDE SERPL-SCNC: 110 MMOL/L — SIGNIFICANT CHANGE UP (ref 98–110)
CO2 SERPL-SCNC: 21 MMOL/L — SIGNIFICANT CHANGE UP (ref 17–32)
CREAT SERPL-MCNC: 1.7 MG/DL — HIGH (ref 0.7–1.5)
EGFR: 28 ML/MIN/1.73M2 — LOW
GLUCOSE SERPL-MCNC: 90 MG/DL — SIGNIFICANT CHANGE UP (ref 70–99)
HCT VFR BLD CALC: 26.7 % — LOW (ref 37–47)
HGB BLD-MCNC: 8 G/DL — LOW (ref 12–16)
MCHC RBC-ENTMCNC: 27.1 PG — SIGNIFICANT CHANGE UP (ref 27–31)
MCHC RBC-ENTMCNC: 30 G/DL — LOW (ref 32–37)
MCV RBC AUTO: 90.5 FL — SIGNIFICANT CHANGE UP (ref 81–99)
NRBC # BLD: 0 /100 WBCS — SIGNIFICANT CHANGE UP (ref 0–0)
PLATELET # BLD AUTO: 278 K/UL — SIGNIFICANT CHANGE UP (ref 130–400)
PMV BLD: 9.4 FL — SIGNIFICANT CHANGE UP (ref 7.4–10.4)
POTASSIUM SERPL-MCNC: 4.8 MMOL/L — SIGNIFICANT CHANGE UP (ref 3.5–5)
POTASSIUM SERPL-SCNC: 4.8 MMOL/L — SIGNIFICANT CHANGE UP (ref 3.5–5)
RBC # BLD: 2.95 M/UL — LOW (ref 4.2–5.4)
RBC # FLD: 15.6 % — HIGH (ref 11.5–14.5)
SODIUM SERPL-SCNC: 143 MMOL/L — SIGNIFICANT CHANGE UP (ref 135–146)
WBC # BLD: 12.38 K/UL — HIGH (ref 4.8–10.8)
WBC # FLD AUTO: 12.38 K/UL — HIGH (ref 4.8–10.8)

## 2024-07-14 RX ADMIN — MIDODRINE HYDROCHLORIDE 5 MILLIGRAM(S): 10 TABLET ORAL at 14:53

## 2024-07-14 RX ADMIN — HEPARIN SODIUM 5000 UNIT(S): 50 INJECTION, SOLUTION INTRAVENOUS at 05:40

## 2024-07-14 RX ADMIN — CEFEPIME 100 MILLIGRAM(S): 1 INJECTION, POWDER, FOR SOLUTION INTRAMUSCULAR; INTRAVENOUS at 17:50

## 2024-07-14 RX ADMIN — MIDODRINE HYDROCHLORIDE 5 MILLIGRAM(S): 10 TABLET ORAL at 05:40

## 2024-07-14 RX ADMIN — HEPARIN SODIUM 5000 UNIT(S): 50 INJECTION, SOLUTION INTRAVENOUS at 22:01

## 2024-07-14 RX ADMIN — Medication 25 MICROGRAM(S): at 05:39

## 2024-07-14 RX ADMIN — MIDODRINE HYDROCHLORIDE 5 MILLIGRAM(S): 10 TABLET ORAL at 22:01

## 2024-07-14 RX ADMIN — Medication 50 MILLILITER(S): at 05:40

## 2024-07-14 RX ADMIN — HEPARIN SODIUM 5000 UNIT(S): 50 INJECTION, SOLUTION INTRAVENOUS at 13:57

## 2024-07-14 RX ADMIN — PANTOPRAZOLE SODIUM 40 MILLIGRAM(S): 40 INJECTION, POWDER, FOR SOLUTION INTRAVENOUS at 12:48

## 2024-07-14 RX ADMIN — Medication 1 APPLICATION(S): at 05:40

## 2024-07-14 RX ADMIN — CEFEPIME 100 MILLIGRAM(S): 1 INJECTION, POWDER, FOR SOLUTION INTRAMUSCULAR; INTRAVENOUS at 05:40

## 2024-07-14 NOTE — PROCEDURE NOTE - NSINDICATIONS_GEN_A_CORE
antibiotic therapy/fluid administration
antibiotic therapy/emergency venous access/fluid administration

## 2024-07-14 NOTE — PROGRESS NOTE ADULT - SUBJECTIVE AND OBJECTIVE BOX
89-year-old female, past medical history of aortic stenosis, HTN, HLD, and anxiety brought in by daughter for weakness and difficulty ambulating for the last 4 days.  Up until 4 days ago, patient has been ambulating with a walker.  Patient's daughter also reports patient has decreased appetite and difficulty swallowing,  Patient states she just has no energy. Patient's daughter reports patient fell once while ambulating, but denies any head trauma or LOC. Patient denies fever/chills, chest pain/shortness of breath, abdominal pain, nausea/vomiting/diarrhea, urinary symptoms.    Interval: completed abx, weak lethargic discussed GOC yesterday    PAST MEDICAL & SURGICAL HISTORY:    HTN (hypertension)  Hypothyroid  Paauilo's disease  Aortic stenosis  Anemia  Hip fracture  s/p right ORIF    Social History:    non smoker    MEDICATIONS  (STANDING):  chlorhexidine 2% Cloths 1 Application(s) Topical <User Schedule>  citalopram 20 milliGRAM(s) Oral daily  dextrose 50% Injectable 12.5 Gram(s) IV Push once  ferrous    sulfate 325 milliGRAM(s) Oral daily  heparin   Injectable 5000 Unit(s) SubCutaneous every 8 hours  levothyroxine Injectable 25 MICROGram(s) IV Push <User Schedule>  midodrine 5 milliGRAM(s) Oral every 8 hours  pantoprazole    Tablet 40 milliGRAM(s) Oral before breakfast  sodium chloride 0.9%. 1000 milliLiter(s) (50 mL/Hr) IV Continuous <Continuous>    MEDICATIONS  (PRN):  acetaminophen  Suppository .. 650 milliGRAM(s) Rectal every 6 hours PRN Temp greater or equal to 38C (100.4F)  ondansetron Injectable 4 milliGRAM(s) IV Push every 8 hours PRN Nausea and/or Vomiting    Allergies    sulfonamides (Unknown)  penicillins (Unknown)    Intolerances    Vital Signs Last 24 Hrs  T(C): 36.8 (13 Jul 2024 04:54), Max: 36.9 (12 Jul 2024 20:22)  T(F): 98.2 (13 Jul 2024 04:54), Max: 98.5 (12 Jul 2024 20:22)  HR: 80 (13 Jul 2024 04:54) (80 - 86)  BP: 132/72 (13 Jul 2024 04:54) (121/61 - 132/72)  BP(mean): --  RR: 18 (13 Jul 2024 04:54) (16 - 18)  SpO2: 97% (13 Jul 2024 04:54) (95% - 97%)    Parameters below as of 13 Jul 2024 04:54  Patient On (Oxygen Delivery Method): nasal cannula    Diet, Pureed:   Mildly Thick Liquids (MILDTHICKLIQS) (07-09-24 @ 11:07) [Active]      PHYSICAL EXAM:  GENERAL: Elderly female, NAD  HEAD:  Atraumatic, Normocephalic  EYES: EOMI, PERRLA, conjunctiva and sclera clear  ENMT: Dry mucous membranes  CHEST/LUNG: Bilateral breath sounds, decreased RLL; No rales, rhonchi or wheezing  HEART: S1,S2 Regular rate and rhythm; No murmurs, rubs, or gallops  ABDOMEN: Soft, nontender, nondistended, +BS  EXTREMITIES: No calf tenderness bilaterally or peripheral edema   NEUROLOGY: non-focal, BUNCH x4, alert &oriented x3    LAB                          7.6    12.19 )-----------( 313      ( 12 Jul 2024 06:41 )             24.5                                 8.4    23.61 )-----------( 306      ( 05 Jul 2024 12:26 )             25.2                           8.9    17.12 )-----------( 300      ( 04 Jul 2024 16:20 )             28.0     07-12    139  |  104  |  59<H>  ----------------------------<  110<H>  4.8   |  23  |  1.5    Ca    10.4      12 Jul 2024 06:41  Mg     1.9     07-11    TPro  5.8<L>  /  Alb  2.9<L>  /  TBili  0.7  /  DBili  x   /  AST  29  /  ALT  36  /  AlkPhos  116<H>  07-11      07-08    133<L>  |  98  |  49<H>  ----------------------------<  98  4.0   |  23  |  1.5    Ca    9.5      08 Jul 2024 05:58  Mg     2.0     07-08    TPro  5.0<L>  /  Alb  2.7<L>  /  TBili  0.6  /  DBili  x   /  AST  43<H>  /  ALT  52<H>  /  AlkPhos  101  07-08 07-06    138  |  99  |  48<H>  ----------------------------<  118<H>  3.3<L>   |  22  |  1.8<H>    Ca    9.9      06 Jul 2024 06:05  Phos  3.3     07-06  Mg     2.9     07-06    TPro  5.6<L>  /  Alb  2.8<L>  /  TBili  1.0  /  DBili  x   /  AST  81<H>  /  ALT  67<H>  /  AlkPhos  122<H>  07-06 07-05    134<L>  |  96<L>  |  44<H>  ----------------------------<  136<H>  3.5   |  23  |  1.8<H>    Ca    9.1      05 Jul 2024 21:33  Phos  3.7     07-05  Mg     1.2     07-05    TPro  5.9<L>  /  Alb  3.1<L>  /  TBili  1.3<H>  /  DBili  x   /  AST  154<H>  /  ALT  76<H>  /  AlkPhos  119<H>  07-05 07-04    138  |  96<L>  |  37<H>  ----------------------------<  141<H>  2.6<LL>   |  26  |  1.4    Ca    9.6      04 Jul 2024 16:20    TPro  6.6  /  Alb  3.4<L>  /  TBili  1.2  /  DBili  x   /  AST  23  /  ALT  12  /  AlkPhos  112  07-04    RAD    ACC: [id number] EXAM:  XR PELVIS AP ONLY 1-2 VIEWS   ORDERED BY: SAUMYA JORDAN     PROCEDURE DATE:  07/04/2024          INTERPRETATION:  CLINICAL HISTORY / REASON FOR EXAM: Pain status post fall    COMPARISON: Pelvis radiograph from December 16, 2018    TECHNIQUE: One view, AP pelvis radiograph    FINDINGS:    Status post left cephalomedullary nail. No acute displaced fracture.   Bilateral hip and lumbar spine degenerative changes. Osteopenia. Surgical   clips overlie the right lower pelvis.      IMPRESSION:    No acute displaced fracture.    --- End of Report ---            KACI STACY MD; Attending Radiologist  This document has been electronically signed. Jul 4 2024  8:53PM

## 2024-07-15 DIAGNOSIS — E63.9 NUTRITIONAL DEFICIENCY, UNSPECIFIED: ICD-10-CM

## 2024-07-15 DIAGNOSIS — A41.9 SEPSIS, UNSPECIFIED ORGANISM: ICD-10-CM

## 2024-07-15 DIAGNOSIS — Z51.5 ENCOUNTER FOR PALLIATIVE CARE: ICD-10-CM

## 2024-07-15 LAB
ANION GAP SERPL CALC-SCNC: 15 MMOL/L — HIGH (ref 7–14)
BUN SERPL-MCNC: 66 MG/DL — CRITICAL HIGH (ref 10–20)
CALCIUM SERPL-MCNC: 10.2 MG/DL — SIGNIFICANT CHANGE UP (ref 8.4–10.5)
CHLORIDE SERPL-SCNC: 112 MMOL/L — HIGH (ref 98–110)
CO2 SERPL-SCNC: 20 MMOL/L — SIGNIFICANT CHANGE UP (ref 17–32)
CREAT SERPL-MCNC: 1.4 MG/DL — SIGNIFICANT CHANGE UP (ref 0.7–1.5)
EGFR: 35 ML/MIN/1.73M2 — LOW
GLUCOSE SERPL-MCNC: 80 MG/DL — SIGNIFICANT CHANGE UP (ref 70–99)
HCT VFR BLD CALC: 27 % — LOW (ref 37–47)
HGB BLD-MCNC: 8.1 G/DL — LOW (ref 12–16)
MCHC RBC-ENTMCNC: 27.1 PG — SIGNIFICANT CHANGE UP (ref 27–31)
MCHC RBC-ENTMCNC: 30 G/DL — LOW (ref 32–37)
MCV RBC AUTO: 90.3 FL — SIGNIFICANT CHANGE UP (ref 81–99)
NRBC # BLD: 0 /100 WBCS — SIGNIFICANT CHANGE UP (ref 0–0)
PLATELET # BLD AUTO: 280 K/UL — SIGNIFICANT CHANGE UP (ref 130–400)
PMV BLD: 9.5 FL — SIGNIFICANT CHANGE UP (ref 7.4–10.4)
POTASSIUM SERPL-MCNC: 4.5 MMOL/L — SIGNIFICANT CHANGE UP (ref 3.5–5)
POTASSIUM SERPL-SCNC: 4.5 MMOL/L — SIGNIFICANT CHANGE UP (ref 3.5–5)
RBC # BLD: 2.99 M/UL — LOW (ref 4.2–5.4)
RBC # FLD: 15.8 % — HIGH (ref 11.5–14.5)
SODIUM SERPL-SCNC: 147 MMOL/L — HIGH (ref 135–146)
WBC # BLD: 13.95 K/UL — HIGH (ref 4.8–10.8)
WBC # FLD AUTO: 13.95 K/UL — HIGH (ref 4.8–10.8)

## 2024-07-15 PROCEDURE — 99222 1ST HOSP IP/OBS MODERATE 55: CPT

## 2024-07-15 PROCEDURE — 99497 ADVNCD CARE PLAN 30 MIN: CPT | Mod: 25

## 2024-07-15 RX ORDER — DEXTROSE MONOHYDRATE AND SODIUM CHLORIDE 5; .3 G/100ML; G/100ML
1000 INJECTION, SOLUTION INTRAVENOUS
Refills: 0 | Status: DISCONTINUED | OUTPATIENT
Start: 2024-07-15 | End: 2024-07-20

## 2024-07-15 RX ADMIN — HEPARIN SODIUM 5000 UNIT(S): 50 INJECTION, SOLUTION INTRAVENOUS at 14:42

## 2024-07-15 RX ADMIN — CEFEPIME 100 MILLIGRAM(S): 1 INJECTION, POWDER, FOR SOLUTION INTRAMUSCULAR; INTRAVENOUS at 17:21

## 2024-07-15 RX ADMIN — HEPARIN SODIUM 5000 UNIT(S): 50 INJECTION, SOLUTION INTRAVENOUS at 21:43

## 2024-07-15 RX ADMIN — Medication 25 MICROGRAM(S): at 06:46

## 2024-07-15 RX ADMIN — CEFEPIME 100 MILLIGRAM(S): 1 INJECTION, POWDER, FOR SOLUTION INTRAMUSCULAR; INTRAVENOUS at 06:33

## 2024-07-15 RX ADMIN — Medication 1 APPLICATION(S): at 06:31

## 2024-07-15 RX ADMIN — HEPARIN SODIUM 5000 UNIT(S): 50 INJECTION, SOLUTION INTRAVENOUS at 06:27

## 2024-07-15 RX ADMIN — DEXTROSE MONOHYDRATE AND SODIUM CHLORIDE 75 MILLILITER(S): 5; .3 INJECTION, SOLUTION INTRAVENOUS at 14:42

## 2024-07-15 RX ADMIN — PANTOPRAZOLE SODIUM 40 MILLIGRAM(S): 40 INJECTION, POWDER, FOR SOLUTION INTRAVENOUS at 11:28

## 2024-07-15 NOTE — CONSULT NOTE ADULT - PROBLEM SELECTOR RECOMMENDATION 2
Patient with poor PO intake and concern for dysphagia  -continue IVF  -discussed CMO/hospice today  -follow up GOC

## 2024-07-15 NOTE — PROGRESS NOTE ADULT - ASSESSMENT
89-year-old female, past medical history of aortic stenosis, HTN, HLD, and anxiety brought in by daughter for weakness and difficulty ambulating for the last 4 days.  Up until 4 days ago, patient has been ambulating with a walker.  Patient's daughter also reports patient has decreased appetite and difficulty swallowing,  Patient states she just has no energy. Patient's daughter reports patient fell once while ambulating, but denies any head trauma or LOC. Patient denies fever/chills, chest pain/shortness of breath, abdominal pain, nausea/vomiting/diarrhea, urinary symptoms.      #Sepsis POA 2/2 pneumonia     -completed abx  -improved WBC   -Supplemental O2 prn   -Monitor I&O    # Poor intake / Dysphagia     - tired   - reviewed GI   - encourage intake   - on IV        #Aortic stenosis   -F/U echo  -Echo 2018: normal LV function, G1DD, mild MR/AR, severe AS     #Hypothyroidism  -Synthroid 50 mcg      #HTN/HLD  -holding statin and HCTZ - resume once cleared by speech and swallow     #Anemia (Baseline Hgb 9-11)  -Continue to monitor     #Diet:    #DVT ppx: HSQ   #Activity: OOB with assistance   #ADVANCE CARE [facility] palliative care evaluation  #DISPOSITION:  D/C planning

## 2024-07-15 NOTE — CHART NOTE - NSCHARTNOTEFT_GEN_A_CORE
Pt looked comfortable at time of visit. Pt's daughter Dominique were at bedside. Family is leaning towards CMO. I was a supportive pastoral presence.  I will follow up for support.

## 2024-07-15 NOTE — CONSULT NOTE ADULT - SUBJECTIVE AND OBJECTIVE BOX
CC: weakness and difficulty ambulating    HPI:  This is a 89-year-old female, past medical history of aortic stenosis, HTN, HLD, and anxiety brought in by daughter for weakness and difficulty ambulating for the last 4 days.  Up until 4 days ago, patient has been ambulating with a walker.  Patient's daughter also reports patient has decreased appetite and difficulty swallowing,  Patient states she just has no energy. Patient's daughter reports patient fell once while ambulating, but denies any head trauma or LOC. Patient denies fever/chills, chest pain/shortness of breath, abdominal pain, nausea/vomiting/diarrhea, urinary symptoms.   (04 Jul 2024 18:41)    PERTINENT PM/SXH:   HTN (hypertension)    Hypothyroid    Edison's disease    Aortic stenosis    Anemia      No significant past surgical history    Hip fracture      FAMILY HISTORY:    None pertinent    ITEMS NOT CHECKED ARE NOT PRESENT    SOCIAL HISTORY:   Significant other/partner[ ]  Children[ ]  Quaker/Spirituality:  Substance hx:  [ ]   Tobacco hx:  [ ]   Alcohol hx: [ ]   Living Situation: [x ]Home  [ ]Long term care  [ ]Rehab [ ]Other  Home Services: [ ] HHA [ ] Visting RN [ ] Hospice  Occupation:  Home Opioid hx:  [ ] Y [ ] N [x ] I-Stop Reference No:    Reference #: [id number] - no meds     ADVANCE DIRECTIVES:     [ ] Full Code [x ] DNR  MOLST  [ ]  Living Will  [ ]   DECISION MAKER(s):  [ ] Health Care Proxy(s)  [ x] Surrogate(s)  [ ] Guardian           Name(s): Phone Number(s):  7 reported children including Dominique and Eddy      BASELINE (I)ADL(s) (prior to admission):    [place]: [ ]Total  [ ] Moderate [ ]Dependent  Palliative Performance Status Version 2:         %    http://npcrc.org/files/news/palliative_performance_scale_ppsv2.pdf    Allergies    sulfonamides (Unknown)  penicillins (Unknown)    Intolerances    MEDICATIONS  (STANDING):  cefepime   IVPB 1000 milliGRAM(s) IV Intermittent every 12 hours  chlorhexidine 2% Cloths 1 Application(s) Topical <User Schedule>  citalopram 20 milliGRAM(s) Oral daily  dextrose 5%. 1000 milliLiter(s) (75 mL/Hr) IV Continuous <Continuous>  dextrose 50% Injectable 12.5 Gram(s) IV Push once  ferrous    sulfate 325 milliGRAM(s) Oral daily  heparin   Injectable 5000 Unit(s) SubCutaneous every 8 hours  levothyroxine Injectable 25 MICROGram(s) IV Push <User Schedule>  midodrine 5 milliGRAM(s) Oral every 8 hours  pantoprazole  Injectable 40 milliGRAM(s) IV Push daily    MEDICATIONS  (PRN):  acetaminophen  Suppository .. 650 milliGRAM(s) Rectal every 6 hours PRN Temp greater or equal to 38C (100.4F)  ondansetron Injectable 4 milliGRAM(s) IV Push every 8 hours PRN Nausea and/or Vomiting    PRESENT SYMPTOMS: [x ]Unable to obtain due to poor mentation   Source if other than patient:  [ ]Family   [ ]Team     Pain: [ ]yes [ ]no  ALL PAIN ASSESSMENT COMPONENTS WERE ASKED ABOUT UNLESS OTHERWISE NOTED  QOL impact -   Location -                    Aggravating factors -  Quality -  Radiation -  Timing-  Severity (0-10 scale):  Minimal acceptable level (0-10 scale):     CPOT:  1  https://www.[facility].org/getattachment/[email or web address]-[email or web address]-[email or web address]/Critical-Care-Pain-Observation-Tool-(CPOT)    PAIN AD Score:   http://geriatrictoolkit.[place].[facility]/cog/painad.pdf (press ctrl +  left click to view)    Dyspnea:                           [ ]None[ ]Mild [ ]Moderate [ ]Severe     Respiratory Distress Observation Scale (RDOS): 1  A score of 0 to 2 signifies little or no respiratory distress, 3 signifies mild distress, scores 4 to 6 indicate moderate distress, and scores greater than 7 signify severe distress  https://www.[facility].ca/sites/default/files/PDFS/128886-fhfbfcmkxvo-behbwmyb-mzhscniuger-mqtwb.pdf    Anxiety:                             [ ]None[ ]Mild [ ]Moderate [ ]Severe   Fatigue:                             [ ]None[ ]Mild [ ]Moderate [ ]Severe   Nausea:                             [ ]None[ ]Mild [ ]Moderate [ ]Severe   Loss of appetite:              [ ]None[ ]Mild [ ]Moderate [ ]Severe   Constipation:                    [ ]None[ ]Mild [ ]Moderate [ ]Severe    Other Symptoms:  [ ]All other review of systems negative     Palliative Performance Status Version 2:         30%    http://npcrc.org/files/news/palliative_performance_scale_ppsv2.pdf    PHYSICAL EXAM:  Vital Signs Last 24 Hrs  T(C): 36.6 (15 Jul 2024 14:44), Max: 36.7 (15 Jul 2024 04:29)  T(F): 97.8 (15 Jul 2024 14:44), Max: 98 (15 Jul 2024 04:29)  HR: 99 (15 Jul 2024 14:44) (85 - 99)  BP: 137/81 (15 Jul 2024 14:44) (125/60 - 137/81)  BP(mean): --  RR: 18 (15 Jul 2024 04:29) (18 - 18)  SpO2: 96% (15 Jul 2024 14:44) (96% - 97%)    Parameters below as of 15 Jul 2024 14:44  Patient On (Oxygen Delivery Method): nasal cannula  O2 Flow (L/min): 2   I&O's Summary    14 Jul 2024 07:01  -  15 Jul 2024 07:00  --------------------------------------------------------  IN: 0 mL / OUT: 500 mL / NET: -500 mL        GENERAL:  [ ] No acute distress [x ]Lethargic  [ ]Unarousable  [ ]Verbal  [ ]Non-Verbal [ ]Cachexia    BEHAVIORAL/PSYCH:  [ ]Alert and Oriented x  [ ] Anxiety [ ] Delirium [ ] Agitation [ x] Calm   EYES: [ ] No scleral icterus [ ] Scleral icterus [ x] Closed  ENMT:  [ ]Dry mouth  [x ]No external oral lesions [ ] No external ear or nose lesions  CARDIOVASCULAR:  [ ]Regular [ ]Irregular [ ]Tachy [ ]Not Tachy  [ ]Yung [ ] Edema [ ] No edema  PULMONARY:  [ ]Tachypnea  [ ]Audible excessive secretions [x ] No labored breathing [ ] labored breathing  GASTROINTESTINAL: [ ]Soft  [ ]Distended  [ ]Not distended [ ]Non tender [ ]Tender  MUSCULOSKELETAL: [ ]No clubbing [ ] clubbing  [ ] No cyanosis [ ] cyanosis  NEUROLOGIC: [ ]No focal deficits  [ ]Follows commands  [ ]Does not follow commands  [ x]Cognitive impairment  [ ]Dysphagia  [ ]Dysarthria  [ ]Paresis   SKIN: [ ] Jaundiced [ x] Non-jaundiced [ ]Rash [ ]No Rash [ ] Warm [ ] Dry  MISC/LINES: [ ] ET tube [ ] Trach [ ]NGT/OGT [ ]PEG [ ]Brown    LABS: reviewed by me                        8.1    13.95 )-----------( 280      ( 15 Jul 2024 07:16 )             27.0   07-15    147<H>  |  112<H>  |  66<HH>  ----------------------------<  80  4.5   |  20  |  1.4    Ca    10.2      15 Jul 2024 07:16        Urinalysis Basic - ( 15 Jul 2024 07:16 )    Color: x / Appearance: x / SG: x / pH: x  Gluc: 80 mg/dL / Ketone: x  / Bili: x / Urobili: x   Blood: x / Protein: x / Nitrite: x   Leuk Esterase: x / RBC: x / WBC x   Sq Epi: x / Non Sq Epi: x / Bacteria: x      RADIOLOGY & ADDITIONAL STUDIES: reviewed by me    < from: Xray Chest 1 View-PORTABLE IMMEDIATE (Xray Chest 1 View-PORTABLE IMMEDIATE .) (07.13.24 @ 10:56) >  Impression:    Right opacity/pleural effusion, decreased. Left opacity/pleural effusion,   increased.. Stable cardiomegaly    < end of copied text >      EKG: reviewed by me    < from: 12 Lead ECG (07.04.24 @ 16:51) >  Ventricular Rate 86 BPM    Atrial Rate 86 BPM    P-R Interval 198 ms    QRS Duration 102 ms    Q-T Interval 404 ms    QTC Calculation(Bazett) 483 ms    P Axis 75 degrees    R Axis 46 degrees    T Axis 235 degrees    Diagnosis Line Sinus rhythm withPremature atrial complexes  Left ventricular hypertrophy with repolarization abnormality  Abnormal ECG    < end of copied text >      PROTEIN CALORIE MALNUTRITION PRESENT: [ ]mild [ ]moderate [ ]severe [ ]underweight [ ]morbid obesity  https://www.andeal.org/vault/3480/web/files/ONC/Table_Clinical%20Characteristics%20to%20Document%20Malnutrition-White%20JV%20et%20al%202012.pdf    Height (cm): 152.4 (07-09-24 @ 02:33)  Weight (kg): 59.647 (07-09-24 @ 02:33)  BMI (kg/m2): 25.7 (07-09-24 @ 02:33)  [ ]PPSV2 < or = to 30% [ ]significant weight loss  [ ]poor nutritional intake  [ ]anasarca      [ ]Artificial Nutrition      Palliative Care Spiritual/Emotional Screening Tool Question  Severity (0-4):                    OR                    [ x] Unable to determine. Will assess at later time if appropriate.  Score of 2 or greater indicates recommendation of Chaplaincy and/or SW referral  Chaplaincy Referral: [ ] Yes [ ] Refused [ ] Following     Caregiver [place]:  [ ] Yes [ ] No    OR    [x ] Unable to determine. Will assess at later time if appropriate.  Social Work Referral [ ]  Patient and Family Centered Care Referral [ ]    Anticipatory Grief Present: [ ] Yes [ ] No    OR     [ x] Unable to determine. Will assess at later time if appropriate.  Social Work Referral [ ]  Patient and Family Centered Care Referral [ ]    Patient discussed with primary medical team MD  Palliative care education provided to patient and/or family

## 2024-07-15 NOTE — PROGRESS NOTE ADULT - SUBJECTIVE AND OBJECTIVE BOX
89-year-old female, past medical history of aortic stenosis, HTN, HLD, and anxiety brought in by daughter for weakness and difficulty ambulating for the last 4 days.  Up until 4 days ago, patient has been ambulating with a walker.  Patient's daughter also reports patient has decreased appetite and difficulty swallowing,  Patient states she just has no energy. Patient's daughter reports patient fell once while ambulating, but denies any head trauma or LOC. Patient denies fever/chills, chest pain/shortness of breath, abdominal pain, nausea/vomiting/diarrhea, urinary symptoms.    Interval: completed abx, weak lethargic met with family last evening     PAST MEDICAL & SURGICAL HISTORY:    HTN (hypertension)  Hypothyroid  Flaco's disease  Aortic stenosis  Anemia  Hip fracture  s/p right ORIF    Social History:    non smoker    MEDICATIONS  (STANDING):  cefepime   IVPB 1000 milliGRAM(s) IV Intermittent every 12 hours  chlorhexidine 2% Cloths 1 Application(s) Topical <User Schedule>  citalopram 20 milliGRAM(s) Oral daily  dextrose 50% Injectable 12.5 Gram(s) IV Push once  ferrous    sulfate 325 milliGRAM(s) Oral daily  heparin   Injectable 5000 Unit(s) SubCutaneous every 8 hours  levothyroxine Injectable 25 MICROGram(s) IV Push <User Schedule>  midodrine 5 milliGRAM(s) Oral every 8 hours  pantoprazole  Injectable 40 milliGRAM(s) IV Push daily  sodium chloride 0.9%. 1000 milliLiter(s) (50 mL/Hr) IV Continuous <Continuous>    MEDICATIONS  (PRN):  acetaminophen  Suppository .. 650 milliGRAM(s) Rectal every 6 hours PRN Temp greater or equal to 38C (100.4F)  ondansetron Injectable 4 milliGRAM(s) IV Push every 8 hours PRN Nausea and/or Vomiting      Allergies    sulfonamides (Unknown)  penicillins (Unknown)    Intolerances    Vital Signs Last 24 Hrs  T(C): 36.7 (15 Jul 2024 04:29), Max: 36.8 (14 Jul 2024 13:54)  T(F): 98 (15 Jul 2024 04:29), Max: 98.3 (14 Jul 2024 13:54)  HR: 89 (15 Jul 2024 04:29) (85 - 92)  BP: 128/64 (15 Jul 2024 04:29) (121/75 - 128/64)  BP(mean): --  RR: 18 (15 Jul 2024 04:29) (18 - 18)  SpO2: 97% (15 Jul 2024 04:29) (95% - 97%)    Parameters below as of 14 Jul 2024 13:54    O2 Flow (L/min): 0.2      Diet, Pureed:   Mildly Thick Liquids (MILDTHICKLIQS) (07-09-24 @ 11:07) [Active]      PHYSICAL EXAM:  GENERAL: Elderly female, NAD  HEAD:  Atraumatic, Normocephalic  EYES: EOMI, PERRLA, conjunctiva and sclera clear  ENMT: Dry mucous membranes  CHEST/LUNG: Bilateral breath sounds, decreased RLL; No rales, rhonchi or wheezing  HEART: S1,S2 Regular rate and rhythm; No murmurs, rubs, or gallops  ABDOMEN: Soft, nontender, nondistended, +BS  EXTREMITIES: No calf tenderness bilaterally or peripheral edema   NEUROLOGY: non-focal, BUNCH x4, alert &oriented x3    LAB                           8.0    12.38 )-----------( 278      ( 14 Jul 2024 09:01 )             26.7                        7.6    12.19 )-----------( 313      ( 12 Jul 2024 06:41 )             24.5                                 8.4    23.61 )-----------( 306      ( 05 Jul 2024 12:26 )             25.2                           8.9    17.12 )-----------( 300      ( 04 Jul 2024 16:20 )             28.0     07-14    143  |  110  |  69<HH>  ----------------------------<  90  4.8   |  21  |  1.7<H>    Ca    10.3      14 Jul 2024 09:01        07-12    139  |  104  |  59<H>  ----------------------------<  110<H>  4.8   |  23  |  1.5    Ca    10.4      12 Jul 2024 06:41  Mg     1.9     07-11    TPro  5.8<L>  /  Alb  2.9<L>  /  TBili  0.7  /  DBili  x   /  AST  29  /  ALT  36  /  AlkPhos  116<H>  07-11 07-08    133<L>  |  98  |  49<H>  ----------------------------<  98  4.0   |  23  |  1.5    Ca    9.5      08 Jul 2024 05:58  Mg     2.0     07-08    TPro  5.0<L>  /  Alb  2.7<L>  /  TBili  0.6  /  DBili  x   /  AST  43<H>  /  ALT  52<H>  /  AlkPhos  101  07-08      07-06    138  |  99  |  48<H>  ----------------------------<  118<H>  3.3<L>   |  22  |  1.8<H>    Ca    9.9      06 Jul 2024 06:05  Phos  3.3     07-06  Mg     2.9     07-06    TPro  5.6<L>  /  Alb  2.8<L>  /  TBili  1.0  /  DBili  x   /  AST  81<H>  /  ALT  67<H>  /  AlkPhos  122<H>  07-06      07-05    134<L>  |  96<L>  |  44<H>  ----------------------------<  136<H>  3.5   |  23  |  1.8<H>    Ca    9.1      05 Jul 2024 21:33  Phos  3.7     07-05  Mg     1.2     07-05    TPro  5.9<L>  /  Alb  3.1<L>  /  TBili  1.3<H>  /  DBili  x   /  AST  154<H>  /  ALT  76<H>  /  AlkPhos  119<H>  07-05 07-04    138  |  96<L>  |  37<H>  ----------------------------<  141<H>  2.6<LL>   |  26  |  1.4    Ca    9.6      04 Jul 2024 16:20    TPro  6.6  /  Alb  3.4<L>  /  TBili  1.2  /  DBili  x   /  AST  23  /  ALT  12  /  AlkPhos  112  07-04    RAD    ACC: [id number] EXAM:  XR CHEST PORTABLE IMMED 1V   ORDERED BY: LAKISHA TARIQ     PROCEDURE DATE:  07/13/2024          INTERPRETATION:  Clinical History / Reason for exam: Pneumonia    Comparison : Chest radiograph July 6, 2024.    Technique/Positioning: Frontal chest.    Findings:    Support devices: None.    Cardiac/mediastinum/hilum: Cardiomegaly, thoracic aortic calcification.    Lung parenchyma/Pleura: Bilateral opacities/pleural effusions.    Skeleton/soft tissues: Stable degenerative changes. Thoracic kyphoplasty   Bilateral humeral neck old fracture deformities.    Impression:    Right opacity/pleural effusion, decreased. Left opacity/pleural effusion,   increased.. Stable cardiomegaly        --- End of Report ---            QUETA ONEAL MD; Attending Radiologist  This document has been electronically signed. Jul 13 2024 11:09AM      ACC: [id number] EXAM:  XR PELVIS AP ONLY 1-2 VIEWS   ORDERED BY: SAUMYA JORDAN     PROCEDURE DATE:  07/04/2024          INTERPRETATION:  CLINICAL HISTORY / REASON FOR EXAM: Pain status post fall    COMPARISON: Pelvis radiograph from December 16, 2018    TECHNIQUE: One view, AP pelvis radiograph    FINDINGS:    Status post left cephalomedullary nail. No acute displaced fracture.   Bilateral hip and lumbar spine degenerative changes. Osteopenia. Surgical   clips overlie the right lower pelvis.      IMPRESSION:    No acute displaced fracture.    --- End of Report ---            KACI STACY MD; Attending Radiologist  This document has been electronically signed. Jul 4 2024  8:53PM

## 2024-07-15 NOTE — CONSULT NOTE ADULT - REASON FOR ADMISSION
sepsis POA, pneumonia, hypokalemia
elevated trops / PNA

## 2024-07-15 NOTE — CONSULT NOTE ADULT - CONVERSATION DETAILS
Spoke with patient's daughter at bedside. Palliative care introduced. She was able to provide a medical history and hospital course. She noted the patient was doing relatively well several weeks ago. However, this hospitalization she has declined, does not wish to eat, and is not able to tolerate PO intake.  She noted that she and her family has spoken about GOC. We discussed options moving forward if the patient cannot tolerate PO intake and artificial nutrition is not wanted, including CMO/hospice. She wishes to speak with her siblings about the option first and will get back to the team about possible placement of a hospice consult and CMO.  DNR/DNI confirmed. All questions answered.

## 2024-07-15 NOTE — CONSULT NOTE ADULT - ASSESSMENT
89yFemale with history of aortic stenosis, HTN, anxiety bought in for weakness and difficulty ambulating.  Patient with sepsis in setting of PNA, poor PO intake, AMS. Palliative care consulted for GOC.    Spoke with patient's daughter at bedside. Palliative care introduced. She was able to provide a medical history and hospital course. She noted the patient was doing relatively well several weeks ago. However, this hospitalization she has declined, does not wish to eat, and is not able to tolerate PO intake.  She noted that she and her family has spoken about GOC. We discussed options moving forward if the patient cannot tolerate PO intake and artificial nutrition is not wanted, including CMO/hospice. She wishes to speak with her siblings about the option first and will get back to the team about possible placement of a hospice consult and CMO. All questions answered.       MEDD (morphine equivalent daily dose):    Education about palliative care provided to patient/family.  See Recs below.    Please call [phone] with questions or concerns 24/7.   We will continue to follow.

## 2024-07-16 RX ORDER — MORPHINE SULFATE 100 MG/1
5 TABLET, EXTENDED RELEASE ORAL EVERY 4 HOURS
Refills: 0 | Status: DISCONTINUED | OUTPATIENT
Start: 2024-07-16 | End: 2024-07-20

## 2024-07-16 RX ADMIN — HEPARIN SODIUM 5000 UNIT(S): 50 INJECTION, SOLUTION INTRAVENOUS at 21:55

## 2024-07-16 RX ADMIN — PANTOPRAZOLE SODIUM 40 MILLIGRAM(S): 40 INJECTION, POWDER, FOR SOLUTION INTRAVENOUS at 12:41

## 2024-07-16 RX ADMIN — HEPARIN SODIUM 5000 UNIT(S): 50 INJECTION, SOLUTION INTRAVENOUS at 06:07

## 2024-07-16 RX ADMIN — HEPARIN SODIUM 5000 UNIT(S): 50 INJECTION, SOLUTION INTRAVENOUS at 13:40

## 2024-07-16 RX ADMIN — Medication 1 APPLICATION(S): at 06:08

## 2024-07-16 RX ADMIN — CEFEPIME 100 MILLIGRAM(S): 1 INJECTION, POWDER, FOR SOLUTION INTRAMUSCULAR; INTRAVENOUS at 06:12

## 2024-07-16 RX ADMIN — DEXTROSE MONOHYDRATE AND SODIUM CHLORIDE 75 MILLILITER(S): 5; .3 INJECTION, SOLUTION INTRAVENOUS at 06:08

## 2024-07-16 RX ADMIN — Medication 25 MICROGRAM(S): at 06:08

## 2024-07-16 NOTE — CHART NOTE - NSCHARTNOTEFT_GEN_A_CORE
Provider:                                              Met with: [   x] Patient  [ x  ] Family  [   ] Other:         Primary Language: [ x  ] English [   ] Other*:                      *Interpretation provided by:         SUPPORT DIAGNOSES            (Check all that apply)         [   ] EOL issues    [   x] Advanced Illness    [   ] Cultural / spiritual concerns    [   ] Pain / suffering    [   ] Dementia / AMS    [   ] Other:    [   ] AD issues    [   ] Grief / loss / sadness    [   ] Discharge issues    [   ] Distress / coping         PSYCHOSOCIAL ASSESSMENT OF PATIENT         (Check all that apply)         [ X  ] Initial Assessment            [   ] Reassessment          [   ] Not Applicable this visit         Pain/suffering acuity:    [  x ] None to mild (0-3)           [   ] Moderate (4-6)        [   ] High (7-10)         Mental Status:    [   ] Alert/oriented (x3)          [  x ] Confused/Altered(x2/x1)         [   ] Non-resp         Functional status:    [   ] Independent w ADLs      [ x ] Needs Assistance             [   ] Bedbound/Full Care         Coping:    [   ] Coping well                     [  ] Coping w/difficulty            [   ] Poor coping   [x] unable to assess         Support system:    [x   ] Strong                              [   ] Adequate                        [   ] Inadequate              Past history and medications for:         [ ] Anxiety       [ ] Depression    [ ] Sleep disorders              SERVICE PROVIDED    [   ]Discharge support / facilitation    [   ]AD / goals of care counseling                                  [   ]EOL / death / bereavement counseling    [ X  ]Counseling / support                                                [   ] Family meeting    [   ]Prayer / sacrament / ritual                                      [   ] Referral    [   ]Other                                                                           NOTE and Plan of Care (PoC):      89yFemale with history of aortic stenosis, HTN, anxiety bought in for weakness and difficulty ambulating.  Patient with sepsis in setting of PNA, poor PO intake, AMS. Palliative care consulted for GOC.  LMSW met with PT and daughter: Dominique at bedside. Pt's daughter noted she spoke with her siblings and is waiting to confirmed with her brother next steps. Pt's daughter noted she is struggling with seeing PT in this state. She reports that 2 weeks ago PT was ambulatory and able to care for herself. Supportive counseling provided. At this time, family is undecided on hospice consult. Palliative Care contact provided. Questions answered and support rendered. Will continue to follow for support. [phone]

## 2024-07-16 NOTE — PROGRESS NOTE ADULT - SUBJECTIVE AND OBJECTIVE BOX
ARIAS PUGA  89y, Female    LOS  12d    INTERVAL EVENTS/HPI  - T(F): , Max: 97.8 (07-15-24 @ 14:44)  - WBC Count: 13.95 (07-15-24 @ 07:16)  WBC Count: 12.38 (07-14-24 @ 09:01)  - Creatinine: 1.4 (07-15-24 @ 07:16)    REVIEW OF SYSTEMS:  CONSTITUTIONAL: No fever or chills  HEENT: No sore throat  RESPIRATORY: No cough, no shortness of breath  CARDIOVASCULAR: No chest pain or palpitations  GASTROINTESTINAL: No abdominal or epigastric pain  GENITOURINARY: No dysuria  NEUROLOGICAL: No headache/dizziness  MSK: No joint pain, erythema, or swelling; no back pain  SKIN: No itching, rashes  All other ROS negative except noted above    Prior hospital charts reviewed [Yes]  Primary team notes reviewed [Yes]  Other consultant notes reviewed [Yes]    ANTIMICROBIALS:   cefepime   IVPB 1000 every 12 hours    MEDICATIONS  (STANDING):  azithromycin  IVPB   255 mL/Hr IV Intermittent (07-04-24 @ 20:54)    cefepime   IVPB   100 mL/Hr IV Intermittent (07-16-24 @ 06:12)   100 mL/Hr IV Intermittent (07-15-24 @ 17:21)   100 mL/Hr IV Intermittent (07-15-24 @ 06:33)   100 mL/Hr IV Intermittent (07-14-24 @ 17:50)   100 mL/Hr IV Intermittent (07-14-24 @ 05:40)   100 mL/Hr IV Intermittent (07-13-24 @ 19:56)    cefepime   IVPB   100 mL/Hr IV Intermittent (07-04-24 @ 18:42)    cefepime   IVPB   100 mL/Hr IV Intermittent (07-05-24 @ 05:54)    cefpodoxime   200 milliGRAM(s) Oral (07-12-24 @ 17:50)   200 milliGRAM(s) Oral (07-12-24 @ 06:25)   200 milliGRAM(s) Oral (07-11-24 @ 18:39)   200 milliGRAM(s) Oral (07-11-24 @ 05:35)   200 milliGRAM(s) Oral (07-10-24 @ 18:00)   200 milliGRAM(s) Oral (07-10-24 @ 05:56)   200 milliGRAM(s) Oral (07-09-24 @ 17:23)   200 milliGRAM(s) Oral (07-09-24 @ 07:01)   200 milliGRAM(s) Oral (07-08-24 @ 17:37)    cefTRIAXone   IVPB   100 mL/Hr IV Intermittent (07-07-24 @ 18:12)   100 mL/Hr IV Intermittent (07-06-24 @ 18:29)   100 mL/Hr IV Intermittent (07-05-24 @ 18:24)    doxycycline IVPB   100 mL/Hr IV Intermittent (07-05-24 @ 17:15)    doxycycline IVPB   100 mL/Hr IV Intermittent (07-08-24 @ 06:02)   100 mL/Hr IV Intermittent (07-07-24 @ 17:08)   100 mL/Hr IV Intermittent (07-07-24 @ 06:30)   100 mL/Hr IV Intermittent (07-06-24 @ 17:25)   100 mL/Hr IV Intermittent (07-06-24 @ 05:23)    doxycycline monohydrate Capsule   100 milliGRAM(s) Oral (07-12-24 @ 17:50)   100 milliGRAM(s) Oral (07-12-24 @ 06:24)   100 milliGRAM(s) Oral (07-11-24 @ 17:25)   100 milliGRAM(s) Oral (07-11-24 @ 05:33)   100 milliGRAM(s) Oral (07-10-24 @ 18:00)   100 milliGRAM(s) Oral (07-10-24 @ 05:56)   100 milliGRAM(s) Oral (07-09-24 @ 17:22)   100 milliGRAM(s) Oral (07-09-24 @ 07:00)   100 milliGRAM(s) Oral (07-08-24 @ 17:37)    vancomycin  IVPB   250 mL/Hr IV Intermittent (07-07-24 @ 18:11)   250 mL/Hr IV Intermittent (07-06-24 @ 17:24)   250 mL/Hr IV Intermittent (07-05-24 @ 18:21)    vancomycin  IVPB   250 mL/Hr IV Intermittent (07-04-24 @ 16:34)        OTHER MEDS: MEDICATIONS  (STANDING):  acetaminophen  Suppository .. 650 every 6 hours PRN  citalopram 20 daily  dextrose 50% Injectable 12.5 once  heparin   Injectable 5000 every 8 hours  levothyroxine Injectable 25 <User Schedule>  midodrine 5 every 8 hours  ondansetron Injectable 4 every 8 hours PRN  pantoprazole  Injectable 40 daily      Vital Signs Last 24 Hrs  T(F): 97.5 (07-16-24 @ 05:06), Max: 98.7 (07-11-24 @ 05:00)    Vital Signs Last 24 Hrs  HR: 91 (07-16-24 @ 05:06) (87 - 99)  BP: 128/72 (07-16-24 @ 05:06) (110/69 - 137/81)  RR: 18 (07-16-24 @ 05:06)  SpO2: 94% (07-16-24 @ 05:06) (94% - 98%)  Wt(kg): --    EXAM:  GENERAL: NAD, Frail looking. on NC   HEAD: No head lesions  NECK: Supple  CHEST/LUNG: Shallow breath sounds.   HEART: S1 S2  ABDOMEN: Soft, nontender  EXTREMITIES: No clubbing  NERVOUS SYSTEM: Somnolent.   MSK: No joint erythema, swelling or pain  SKIN: No rashes or lesions, no superficial thrombophlebitis    Labs:                        8.1    13.95 )-----------( 280      ( 15 Jul 2024 07:16 )             27.0     07-15    147<H>  |  112<H>  |  66<HH>  ----------------------------<  80  4.5   |  20  |  1.4    Ca    10.2      15 Jul 2024 07:16        WBC Trend:  WBC Count: 13.95 (07-15-24 @ 07:16)  WBC Count: 12.38 (07-14-24 @ 09:01)  WBC Count: 13.44 (07-13-24 @ 08:39)  WBC Count: 12.19 (07-12-24 @ 06:41)      Creatine Trend:  Creatinine: 1.4 (07-15)  Creatinine: 1.7 (07-14)  Creatinine: 1.6 (07-13)  Creatinine: 1.5 (07-12)      Liver Biochemical Testing Trend:  Alanine Aminotransferase (ALT/SGPT): 36 (07-11)  Alanine Aminotransferase (ALT/SGPT): 39 (07-10)  Alanine Aminotransferase (ALT/SGPT): 52 *H* (07-08)  Alanine Aminotransferase (ALT/SGPT): 67 *H* (07-06)  Alanine Aminotransferase (ALT/SGPT): 76 *H* (07-05)  Aspartate Aminotransferase (AST/SGOT): 29 (07-11-24 @ 08:27)  Aspartate Aminotransferase (AST/SGOT): 30 (07-10-24 @ 08:19)  Aspartate Aminotransferase (AST/SGOT): 43 (07-08-24 @ 05:58)  Aspartate Aminotransferase (AST/SGOT): 81 (07-06-24 @ 06:05)  Aspartate Aminotransferase (AST/SGOT): 154 (07-05-24 @ 11:22)  Bilirubin Total: 0.7 (07-11)  Bilirubin Total: 0.7 (07-10)  Bilirubin Total: 0.6 (07-08)  Bilirubin Total: 1.0 (07-06)  Bilirubin Total: 1.3 (07-05)      Trend LDH      Urinalysis Basic - ( 15 Jul 2024 07:16 )    Color: x / Appearance: x / SG: x / pH: x  Gluc: 80 mg/dL / Ketone: x  / Bili: x / Urobili: x   Blood: x / Protein: x / Nitrite: x   Leuk Esterase: x / RBC: x / WBC x   Sq Epi: x / Non Sq Epi: x / Bacteria: x        MICROBIOLOGY:  Vancomycin Level, Trough: 11.4 (07-06 @ 15:00)    Female    Urinalysis with Rflx Culture (collected 04 Jul 2024 17:55)    Culture - Blood (collected 04 Jul 2024 16:20)  Source: .Blood Blood-Peripheral  Final Report:    No growth at 5 days    Culture - Blood (collected 04 Jul 2024 16:20)  Source: .Blood Blood-Peripheral  Final Report:    No growth at 5 days      Ferritin: 73 (07-12)                  RADIOLOGY & ADDITIONAL TESTS:  I have personally reviewed the relevant images.   CXR      CT      < from: Xray Chest 1 View-PORTABLE IMMEDIATE (Xray Chest 1 View-PORTABLE IMMEDIATE .) (07.13.24 @ 10:56) >  INTERPRETATION:  Clinical History / Reason for exam: Pneumonia    Comparison : Chest radiograph July 6, 2024.    Technique/Positioning: Frontal chest.    Findings:    Support devices: None.    Cardiac/mediastinum/hilum: Cardiomegaly, thoracic aortic calcification.    Lung parenchyma/Pleura: Bilateral opacities/pleural effusions.    Skeleton/soft tissues: Stable degenerative changes. Thoracic kyphoplasty   Bilateral humeral neck old fracture deformities.    Impression:    Right opacity/pleural effusion, decreased. Left opacity/pleural effusion,   increased.. Stable cardiomegaly        < end of copied text >    WEIGHT  Weight (kg): 59.647 (07-09-24 @ 02:33)      All available historical records have been reviewed

## 2024-07-16 NOTE — PROGRESS NOTE ADULT - ASSESSMENT
This is a 89-year-old female, past medical history of aortic stenosis, HTN, HLD, and anxiety brought in by daughter for weakness and difficulty ambulating for the last 4 days.    IMPRESSION  #Acute hypoxic respiratory failure. Concern for Community acquired pneumonia vs aspiration, vs chemical pneumonitis.  #Shock- Likely cardiogenic, Severe aortic stenosis. Heart failure with reduced EF- Improved.  #Significant Dysphagia   #MASSIMO over CKD- US kidney bladder normal.  #Transaminitis-Improved.   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  MRSA Nares negative    RECOMMENDATIONS  -Blood cultures negative.   -Was kept on CTX/Dox stopped (7/12)  -Resumed IV cefepime 1 gram q 12 hours (7/13)   -Discussed with the family, that prolonged abx for her current clinical condition is not needed. Likely will do more harm then good.  -Need to have goals of care conversation. Mental status remains poor.   -Can consider stopping antibiotics.   -Guarded prognosis. Goals of care. Off loading to prevent pressure sores and preventive measures to avoid aspiration.   -Patient will benefit from PCV 20 and RSV vaccines in 1-2 months if gets discharged.     If any questions, please send a message or call on [facility] Teams  Please continue to update ID with any pertinent new laboratory or radiographic findings.    Demario Gill M.D  Infectious Diseases Attending/   [employer] [facility]

## 2024-07-16 NOTE — PROGRESS NOTE ADULT - ASSESSMENT
89-year-old female, past medical history of aortic stenosis, HTN, HLD, and anxiety   #Sepsis POA 2/2 pneumonia     -completed abx  -improved WBC   -Supplemental O2 prn   -Monitor I&O    # Poor intake / Dysphagia     - tired   - reviewed GI   - encourage intake   - on IV        #Aortic stenosis   -F/U echo  -Echo 2018: normal LV function, G1DD, mild MR/AR, severe AS     #Hypothyroidism  -Synthroid 50 mcg      #HTN/HLD  -holding statin and HCTZ - resume once cleared by speech and swallow     #Anemia (Baseline Hgb 9-11)  -Continue to monitor     #Diet:    #DVT ppx: HSQ   #Activity: OOB with assistance   #[facility] palliative care evaluation  #DISPOSITION:  D/C planning

## 2024-07-16 NOTE — SWALLOW BEDSIDE ASSESSMENT ADULT - SWALLOW EVAL: RECOMMENDED FEEDING/EATING TECHNIQUES
maintain upright posture during/after eating for 30 mins/oral hygiene/position upright (90 degrees)

## 2024-07-16 NOTE — SWALLOW BEDSIDE ASSESSMENT ADULT - DIET PRIOR TO ADMI
regular w thins per family's report

## 2024-07-16 NOTE — SWALLOW BEDSIDE ASSESSMENT ADULT - SWALLOW EVAL: FUNCTIONAL LEVEL AT TIME OF EVAL
A&Ox4 but confused otherwise + attempts to take NC off + family at bedside - daughter reports she coughs on thins but this was not seen on today's eval / 2L NC
Increased lethargy and decreased ability to maintain arousal. Family at b/s.
A&Ox1 + increased lethargy
increased lethargy. Pts daughter at bedside requested SLP for family education
A&Ox4 but confused otherwise + attempts to take NC off + family at bedside , daughter reports pt has had difficulty swallowing and bolus holds for one week,
increased lethargy
A&Ox1 + increased lethargy

## 2024-07-16 NOTE — SWALLOW BEDSIDE ASSESSMENT ADULT - SWALLOW EVAL: CURRENT DIET
ETC/thin liquids
NPO
puree w thins
puree w mildly thick
NPO
puree w mildly thick
puree w mildly thick

## 2024-07-16 NOTE — SWALLOW BEDSIDE ASSESSMENT ADULT - SWALLOW EVAL: ORAL MUSCULATURE
unable to assess due to poor participation/comprehension
unable to assess due to poor participation/comprehension
generally intact
unable to assess due to poor participation/comprehension
unable to assess due to poor participation/comprehension
generally intact
unable to assess due to poor participation/comprehension

## 2024-07-16 NOTE — SWALLOW BEDSIDE ASSESSMENT ADULT - ASR SWALLOW REFERRAL
GI CONSULT
GI CONSULT d/t belching + CALORIE COUNT/Registered Dietitian
GI CONSULT d/t belching + CALORIE COUNT/Registered Dietitian
Registered Dietitian
GI CONSULT d/t belching
GI CONSULT d/t belching + CALORIE COUNT/Registered Dietitian

## 2024-07-16 NOTE — SWALLOW BEDSIDE ASSESSMENT ADULT - ASR SWALLOW ASPIRATION MONITOR
change of breathing pattern/oral hygiene/position upright (89Y)/cough/gurgly voice/fever/pneumonia/throat clearing/upper respiratory infection

## 2024-07-16 NOTE — CHART NOTE - NSCHARTNOTEFT_GEN_A_CORE
d/w dr delaney   and family at bedside     NO IV / NGT     await hospice care     roxanol concentrate 5mg po q4h prn pain

## 2024-07-16 NOTE — SWALLOW BEDSIDE ASSESSMENT ADULT - NS ASR SWALLOW FINDINGS DISCUS
MD , RN,  family/Physician/Nursing/Patient/Family
MD Farley + family/Physician/Nursing/Patient/Family
MD , RN and family/Physician/Nursing/Patient
MD , RN and family/Physician/Nursing/Patient/Family
MD , RN,  family/Physician/Nursing/Patient/Family

## 2024-07-16 NOTE — CHART NOTE - NSCHARTNOTEFT_GEN_A_CORE
d/w family about patient poor prognosis  who spoke with Dr Islas     will place pulm consult and hospice referral   HCP would not like an IV placed at this moment    npo as per SLP reccs , no ngt to be placed as per HCP

## 2024-07-16 NOTE — SWALLOW BEDSIDE ASSESSMENT ADULT - SLP GENERAL OBSERVATIONS
pt found with increased lethargy. Pts daughter at bedside requested SLP for family education. SLP provided education regarding aspiration precautions and MBS results from July 10th 2024. Pt's daughter already received education from SLP that conducted MBS exam but had some more questions. Pt's daughter states pt has had decreased PO intake. Unable to assess oropharyngeal swallow function 2/2 pt minimal arousal & declining tx
Pt found laying in bed A&Ox4 but confused otherwise + attempts to take NC off + family at bedside , daughter reports pt has had difficulty swallowing and bolus holds for one week. RN reports episode of regurgitating ETC food and liquid from oral and nasal cavity this morning. RN reports pt bolus holding ETC- not eating.
Pt found with increased lethargy.  Unable to assess oropharyngeal swallow function 2/2 pt minimal arousal.
pt found with increased lethargy
pt found with increased lethargy
pt found with increased lethargy. Unable to assess oropharyngeal swallow function 2/2 pt minimal arousal & declining tx

## 2024-07-16 NOTE — SWALLOW BEDSIDE ASSESSMENT ADULT - ADDITIONAL RECOMMENDATIONS
SLP d/c reconsult PRN, GOC CONVO warranted to determine comfort care measures vs. non oral means of nutrition/hydration.
SLP to f/u w MBS
SLP to f/u
SLP to f/u, GOC CONVO, CALORIE COUNT
SLP to f/u

## 2024-07-16 NOTE — PROGRESS NOTE ADULT - SUBJECTIVE AND OBJECTIVE BOX
89-year-old female, past medical history of aortic stenosis, HTN, HLD, and anxiety    Interval: reviewed chart [facility]     PAST MEDICAL & SURGICAL HISTORY:    HTN (hypertension)  Hypothyroid  Levels's disease  Aortic stenosis  Anemia  Hip fracture  s/p right ORIF    Social History:    non smoker    MEDICATIONS  (STANDING):  cefepime   IVPB 1000 milliGRAM(s) IV Intermittent every 12 hours  chlorhexidine 2% Cloths 1 Application(s) Topical <User Schedule>  citalopram 20 milliGRAM(s) Oral daily  dextrose 50% Injectable 12.5 Gram(s) IV Push once  ferrous    sulfate 325 milliGRAM(s) Oral daily  heparin   Injectable 5000 Unit(s) SubCutaneous every 8 hours  levothyroxine Injectable 25 MICROGram(s) IV Push <User Schedule>  midodrine 5 milliGRAM(s) Oral every 8 hours  pantoprazole  Injectable 40 milliGRAM(s) IV Push daily  sodium chloride 0.9%. 1000 milliLiter(s) (50 mL/Hr) IV Continuous <Continuous>    MEDICATIONS  (PRN):  acetaminophen  Suppository .. 650 milliGRAM(s) Rectal every 6 hours PRN Temp greater or equal to 38C (100.4F)  ondansetron Injectable 4 milliGRAM(s) IV Push every 8 hours PRN Nausea and/or Vomiting      Allergies    sulfonamides (Unknown)  penicillins (Unknown)    Intolerances    Vital Signs Last 24 Hrs  T(C): 36.2 (15 Jul 2024 20:30), Max: 36.6 (15 Jul 2024 14:44)  T(F): 97.2 (15 Jul 2024 20:30), Max: 97.8 (15 Jul 2024 14:44)  HR: 87 (15 Jul 2024 20:30) (87 - 99)  BP: 110/69 (15 Jul 2024 20:30) (110/69 - 137/81)  BP(mean): --  RR: --  SpO2: 98% (15 Jul 2024 20:30) (96% - 98%)    Parameters below as of 15 Jul 2024 20:30  Patient On (Oxygen Delivery Method): nasal cannula  O2 Flow (L/min): 2    Diet, Pureed:   Mildly Thick Liquids (MILDTHICKLIQS) (07-09-24 @ 11:07) [Active]      PHYSICAL EXAM:  GENERAL: Elderly female, NAD  HEAD:  Atraumatic, Normocephalic  EYES: EOMI, PERRLA, conjunctiva and sclera clear  ENMT: Dry mucous membranes  CHEST/LUNG: Bilateral breath sounds, decreased RLL; No rales, rhonchi or wheezing  HEART: S1,S2 Regular rate and rhythm; No murmurs, rubs, or gallops  ABDOMEN: Soft, nontender, nondistended, +BS  EXTREMITIES: No calf tenderness bilaterally or peripheral edema   NEUROLOGY: non-focal, BUNCH x4, alert &oriented x3    LAB                          8.1    13.95 )-----------( 280      ( 15 Jul 2024 07:16 )             27.0                            8.0    12.38 )-----------( 278      ( 14 Jul 2024 09:01 )             26.7                        7.6    12.19 )-----------( 313      ( 12 Jul 2024 06:41 )             24.5                                 8.4    23.61 )-----------( 306      ( 05 Jul 2024 12:26 )             25.2                           8.9    17.12 )-----------( 300      ( 04 Jul 2024 16:20 )             28.0     07-14    143  |  110  |  69<HH>  ----------------------------<  90  4.8   |  21  |  1.7<H>    Ca    10.3      14 Jul 2024 09:01        07-12    139  |  104  |  59<H>  ----------------------------<  110<H>  4.8   |  23  |  1.5    Ca    10.4      12 Jul 2024 06:41  Mg     1.9     07-11    TPro  5.8<L>  /  Alb  2.9<L>  /  TBili  0.7  /  DBili  x   /  AST  29  /  ALT  36  /  AlkPhos  116<H>  07-11      07-08    133<L>  |  98  |  49<H>  ----------------------------<  98  4.0   |  23  |  1.5    Ca    9.5      08 Jul 2024 05:58  Mg     2.0     07-08    TPro  5.0<L>  /  Alb  2.7<L>  /  TBili  0.6  /  DBili  x   /  AST  43<H>  /  ALT  52<H>  /  AlkPhos  101  07-08      07-06    138  |  99  |  48<H>  ----------------------------<  118<H>  3.3<L>   |  22  |  1.8<H>    Ca    9.9      06 Jul 2024 06:05  Phos  3.3     07-06  Mg     2.9     07-06    TPro  5.6<L>  /  Alb  2.8<L>  /  TBili  1.0  /  DBili  x   /  AST  81<H>  /  ALT  67<H>  /  AlkPhos  122<H>  07-06      07-05    134<L>  |  96<L>  |  44<H>  ----------------------------<  136<H>  3.5   |  23  |  1.8<H>    Ca    9.1      05 Jul 2024 21:33  Phos  3.7     07-05  Mg     1.2     07-05    TPro  5.9<L>  /  Alb  3.1<L>  /  TBili  1.3<H>  /  DBili  x   /  AST  154<H>  /  ALT  76<H>  /  AlkPhos  119<H>  07-05 07-04    138  |  96<L>  |  37<H>  ----------------------------<  141<H>  2.6<LL>   |  26  |  1.4    Ca    9.6      04 Jul 2024 16:20    TPro  6.6  /  Alb  3.4<L>  /  TBili  1.2  /  DBili  x   /  AST  23  /  ALT  12  /  AlkPhos  112  07-04    RAD    ACC: [id number] EXAM:  XR CHEST PORTABLE IMMED 1V   ORDERED BY: LAKISHA TARIQ     PROCEDURE DATE:  07/13/2024          INTERPRETATION:  Clinical History / Reason for exam: Pneumonia    Comparison : Chest radiograph July 6, 2024.    Technique/Positioning: Frontal chest.    Findings:    Support devices: None.    Cardiac/mediastinum/hilum: Cardiomegaly, thoracic aortic calcification.    Lung parenchyma/Pleura: Bilateral opacities/pleural effusions.    Skeleton/soft tissues: Stable degenerative changes. Thoracic kyphoplasty   Bilateral humeral neck old fracture deformities.    Impression:    Right opacity/pleural effusion, decreased. Left opacity/pleural effusion,   increased.. Stable cardiomegaly        --- End of Report ---            QUETA ONEAL MD; Attending Radiologist  This document has been electronically signed. Jul 13 2024 11:09AM      ACC: [id number] EXAM:  XR PELVIS AP ONLY 1-2 VIEWS   ORDERED BY: SAUMYA JORDAN     PROCEDURE DATE:  07/04/2024          INTERPRETATION:  CLINICAL HISTORY / REASON FOR EXAM: Pain status post fall    COMPARISON: Pelvis radiograph from December 16, 2018    TECHNIQUE: One view, AP pelvis radiograph    FINDINGS:    Status post left cephalomedullary nail. No acute displaced fracture.   Bilateral hip and lumbar spine degenerative changes. Osteopenia. Surgical   clips overlie the right lower pelvis.      IMPRESSION:    No acute displaced fracture.    --- End of Report ---            KACI STACY MD; Attending Radiologist  This document has been electronically signed. Jul 4 2024  8:53PM

## 2024-07-17 PROCEDURE — 99232 SBSQ HOSP IP/OBS MODERATE 35: CPT

## 2024-07-17 RX ADMIN — MORPHINE SULFATE 5 MILLIGRAM(S): 100 TABLET, EXTENDED RELEASE ORAL at 14:55

## 2024-07-17 RX ADMIN — HEPARIN SODIUM 5000 UNIT(S): 50 INJECTION, SOLUTION INTRAVENOUS at 05:58

## 2024-07-17 RX ADMIN — Medication 1 APPLICATION(S): at 05:57

## 2024-07-17 RX ADMIN — MORPHINE SULFATE 5 MILLIGRAM(S): 100 TABLET, EXTENDED RELEASE ORAL at 14:53

## 2024-07-17 RX ADMIN — MORPHINE SULFATE 5 MILLIGRAM(S): 100 TABLET, EXTENDED RELEASE ORAL at 23:51

## 2024-07-17 NOTE — PROGRESS NOTE ADULT - SUBJECTIVE AND OBJECTIVE BOX
89-year-old female, past medical history of aortic stenosis, HTN, HLD, and anxiety    Interval: reviewed chart [facility] ; awaiting Hospice spoke with Family    PAST MEDICAL & SURGICAL HISTORY:    HTN (hypertension)  Hypothyroid  Fisk's disease  Aortic stenosis  Anemia  Hip fracture  s/p right ORIF    Social History:    non smoker    MEDICATIONS  (STANDING):  cefepime   IVPB 1000 milliGRAM(s) IV Intermittent every 12 hours  chlorhexidine 2% Cloths 1 Application(s) Topical <User Schedule>  citalopram 20 milliGRAM(s) Oral daily  dextrose 5%. 1000 milliLiter(s) (75 mL/Hr) IV Continuous <Continuous>  dextrose 50% Injectable 12.5 Gram(s) IV Push once  ferrous    sulfate 325 milliGRAM(s) Oral daily  heparin   Injectable 5000 Unit(s) SubCutaneous every 8 hours  levothyroxine Injectable 25 MICROGram(s) IV Push <User Schedule>  midodrine 5 milliGRAM(s) Oral every 8 hours  pantoprazole  Injectable 40 milliGRAM(s) IV Push daily    MEDICATIONS  (PRN):  acetaminophen  Suppository .. 650 milliGRAM(s) Rectal every 6 hours PRN Temp greater or equal to 38C (100.4F)  morphine Concentrate 5 milliGRAM(s) Oral every 4 hours PRN Severe Pain (7 - 10)  ondansetron Injectable 4 milliGRAM(s) IV Push every 8 hours PRN Nausea and/or Vomiting        Allergies    sulfonamides (Unknown)  penicillins (Unknown)    Intolerances    Vital Signs Last 24 Hrs  T(C): 36.8 (17 Jul 2024 04:42), Max: 36.9 (16 Jul 2024 20:45)  T(F): 98.3 (17 Jul 2024 04:42), Max: 98.5 (16 Jul 2024 20:45)  HR: 91 (17 Jul 2024 04:42) (85 - 91)  BP: 112/67 (17 Jul 2024 04:42) (112/65 - 115/75)  BP(mean): 88 (16 Jul 2024 14:06) (88 - 88)  RR: 18 (17 Jul 2024 04:42) (18 - 18)  SpO2: 94% (17 Jul 2024 04:42) (94% - 97%)    Parameters below as of 17 Jul 2024 04:42  Patient On (Oxygen Delivery Method): nasal cannula  O2 Flow (L/min): 2    Diet, NPO (07-16-24 @ 13:02) [Active]    PHYSICAL EXAM:  GENERAL: Elderly female, NAD  HEAD:  Atraumatic, Normocephalic  EYES: EOMI, PERRLA, conjunctiva and sclera clear  ENMT: Dry mucous membranes  CHEST/LUNG: Bilateral breath sounds, decreased RLL; No rales, rhonchi or wheezing  HEART: S1,S2 Regular rate and rhythm; No murmurs, rubs, or gallops  ABDOMEN: Soft, nontender, nondistended, +BS  EXTREMITIES: No calf tenderness bilaterally or peripheral edema   NEUROLOGY: non-focal, BUNCH x4, alert &oriented x3    LAB                          8.1    13.95 )-----------( 280      ( 15 Jul 2024 07:16 )             27.0                            8.0    12.38 )-----------( 278      ( 14 Jul 2024 09:01 )             26.7                        7.6    12.19 )-----------( 313      ( 12 Jul 2024 06:41 )             24.5                                 8.4    23.61 )-----------( 306      ( 05 Jul 2024 12:26 )             25.2                           8.9    17.12 )-----------( 300      ( 04 Jul 2024 16:20 )             28.0     07-14    143  |  110  |  69<HH>  ----------------------------<  90  4.8   |  21  |  1.7<H>    Ca    10.3      14 Jul 2024 09:01        07-12    139  |  104  |  59<H>  ----------------------------<  110<H>  4.8   |  23  |  1.5    Ca    10.4      12 Jul 2024 06:41  Mg     1.9     07-11    TPro  5.8<L>  /  Alb  2.9<L>  /  TBili  0.7  /  DBili  x   /  AST  29  /  ALT  36  /  AlkPhos  116<H>  07-11 07-08    133<L>  |  98  |  49<H>  ----------------------------<  98  4.0   |  23  |  1.5    Ca    9.5      08 Jul 2024 05:58  Mg     2.0     07-08    TPro  5.0<L>  /  Alb  2.7<L>  /  TBili  0.6  /  DBili  x   /  AST  43<H>  /  ALT  52<H>  /  AlkPhos  101  07-08 07-06    138  |  99  |  48<H>  ----------------------------<  118<H>  3.3<L>   |  22  |  1.8<H>    Ca    9.9      06 Jul 2024 06:05  Phos  3.3     07-06  Mg     2.9     07-06    TPro  5.6<L>  /  Alb  2.8<L>  /  TBili  1.0  /  DBili  x   /  AST  81<H>  /  ALT  67<H>  /  AlkPhos  122<H>  07-06 07-05    134<L>  |  96<L>  |  44<H>  ----------------------------<  136<H>  3.5   |  23  |  1.8<H>    Ca    9.1      05 Jul 2024 21:33  Phos  3.7     07-05  Mg     1.2     07-05    TPro  5.9<L>  /  Alb  3.1<L>  /  TBili  1.3<H>  /  DBili  x   /  AST  154<H>  /  ALT  76<H>  /  AlkPhos  119<H>  07-05 07-04    138  |  96<L>  |  37<H>  ----------------------------<  141<H>  2.6<LL>   |  26  |  1.4    Ca    9.6      04 Jul 2024 16:20    TPro  6.6  /  Alb  3.4<L>  /  TBili  1.2  /  DBili  x   /  AST  23  /  ALT  12  /  AlkPhos  112  07-04    RAD    ACC: [id number] EXAM:  XR CHEST PORTABLE IMMED 1V   ORDERED BY: LAKISHA TARIQ     PROCEDURE DATE:  07/13/2024          INTERPRETATION:  Clinical History / Reason for exam: Pneumonia    Comparison : Chest radiograph July 6, 2024.    Technique/Positioning: Frontal chest.    Findings:    Support devices: None.    Cardiac/mediastinum/hilum: Cardiomegaly, thoracic aortic calcification.    Lung parenchyma/Pleura: Bilateral opacities/pleural effusions.    Skeleton/soft tissues: Stable degenerative changes. Thoracic kyphoplasty   Bilateral humeral neck old fracture deformities.    Impression:    Right opacity/pleural effusion, decreased. Left opacity/pleural effusion,   increased.. Stable cardiomegaly        --- End of Report ---            QUETA ONEAL MD; Attending Radiologist  This document has been electronically signed. Jul 13 2024 11:09AM      ACC: [id number] EXAM:  XR PELVIS AP ONLY 1-2 VIEWS   ORDERED BY: SAUMYA JORDAN     PROCEDURE DATE:  07/04/2024          INTERPRETATION:  CLINICAL HISTORY / REASON FOR EXAM: Pain status post fall    COMPARISON: Pelvis radiograph from December 16, 2018    TECHNIQUE: One view, AP pelvis radiograph    FINDINGS:    Status post left cephalomedullary nail. No acute displaced fracture.   Bilateral hip and lumbar spine degenerative changes. Osteopenia. Surgical   clips overlie the right lower pelvis.      IMPRESSION:    No acute displaced fracture.    --- End of Report ---            KACI STACY MD; Attending Radiologist  This document has been electronically signed. Jul 4 2024  8:53PM

## 2024-07-17 NOTE — CHART NOTE - NSCHARTNOTEFT_GEN_A_CORE
Hospice note reviewed- awaiting Hospice follow-up pending d/w pt's daughter Dominique      pt sleeping at time of pt assessment, appearing comfortable and in NAD    spoke w/ pt's family at bedside- All concerns addressed and all questions answered at length to the satisfaction of all participants; pt's daughter Dominique was not present during respective conversation

## 2024-07-17 NOTE — PROGRESS NOTE ADULT - ASSESSMENT
89-year-old female, past medical history of aortic stenosis, HTN, HLD, and anxiety   #Sepsis POA 2/2 pneumonia     -off ABX  -improved WBC   -Supplemental O2 prn   -Monitor I&O    # Poor intake / Dysphagia / NPO    - tired   - reviewed speech           #Aortic stenosis   -F/U echo  -Echo 2018: normal LV function, G1DD, mild MR/AR, severe AS     #Hypothyroidism  -Synthroid 50 mcg      #HTN/HLD  -holding statin and HCTZ - resume once cleared by speech and swallow     #Anemia (Baseline Hgb 9-11)  -Continue to monitor     #Diet:    #DVT ppx: HSQ   #Activity: OOB with assistance   #[facility] palliative care evaluation for HOSPICE  #DISPOSITION:  D/C planning

## 2024-07-17 NOTE — PROGRESS NOTE ADULT - SUBJECTIVE AND OBJECTIVE BOX
Patient is a 89y old  Female who presents with a chief complaint of sepsis POA, pneumonia, hypokalemia (17 Jul 2024 05:36)        Over Night Events:    unresponsive         ROS:  See HPI    PHYSICAL EXAM    ICU Vital Signs Last 24 Hrs  T(C): 36.8 (17 Jul 2024 04:42), Max: 36.9 (16 Jul 2024 20:45)  T(F): 98.3 (17 Jul 2024 04:42), Max: 98.5 (16 Jul 2024 20:45)  HR: 91 (17 Jul 2024 04:42) (85 - 91)  BP: 112/67 (17 Jul 2024 04:42) (112/65 - 115/75)  BP(mean): 88 (16 Jul 2024 14:06) (88 - 88)  ABP: --  ABP(mean): --  RR: 18 (17 Jul 2024 04:42) (18 - 18)  SpO2: 94% (17 Jul 2024 04:42) (94% - 97%)    O2 Parameters below as of 17 Jul 2024 04:42  Patient On (Oxygen Delivery Method): nasal cannula  O2 Flow (L/min): 2          General: lethargic, NAD  HEENT: RAMONA             Lymphatic system: No cervical LN   Lungs: Bilateral BS, crackles   Cardiovascular: Regular   Gastrointestinal: Soft, Positive BS  Extremities: No clubbing. No cyanosis.   Skin: Warm, intact  Neurological: AAO 0       07-16-24 @ 07:01  -  07-17-24 @ 07:00  --------------------------------------------------------  IN:  Total IN: 0 mL    OUT:    Voided (mL): 600 mL  Total OUT: 600 mL    Total NET: -600 mL          LABS:                                                                                                                                                                                                                                                                             MEDICATIONS  (STANDING):  cefepime   IVPB 1000 milliGRAM(s) IV Intermittent every 12 hours  chlorhexidine 2% Cloths 1 Application(s) Topical <User Schedule>  citalopram 20 milliGRAM(s) Oral daily  dextrose 5%. 1000 milliLiter(s) (75 mL/Hr) IV Continuous <Continuous>  dextrose 50% Injectable 12.5 Gram(s) IV Push once  ferrous    sulfate 325 milliGRAM(s) Oral daily  heparin   Injectable 5000 Unit(s) SubCutaneous every 8 hours  levothyroxine Injectable 25 MICROGram(s) IV Push <User Schedule>  midodrine 5 milliGRAM(s) Oral every 8 hours  pantoprazole  Injectable 40 milliGRAM(s) IV Push daily    MEDICATIONS  (PRN):  acetaminophen  Suppository .. 650 milliGRAM(s) Rectal every 6 hours PRN Temp greater or equal to 38C (100.4F)  morphine Concentrate 5 milliGRAM(s) Oral every 4 hours PRN Severe Pain (7 - 10)  ondansetron Injectable 4 milliGRAM(s) IV Push every 8 hours PRN Nausea and/or Vomiting      Xrays:                                                                                     ECHO

## 2024-07-17 NOTE — PROGRESS NOTE ADULT - ASSESSMENT
Impression:     Pulmonary edema   Possible aspiration   Aortic stenosis   Acute hypoxemic respiratory failure     Plan:     Family at the bedside   They want comfort measures   On NC; keep spo2 more than 92%  Involve palliative care team   Daughter does not want labs, IVs, NGT  BNP likely to be elevated; never checked   If worsened respiratory status then consider lasix IV   On Abx; receiving them? Check procal  Recall as needed

## 2024-07-17 NOTE — HOSPICE CARE NOTE - CONVESATION DETAILS
Phone call to patient's daughter Dominique [phone] who was at a doctors appointment for herself and said she will call hospice back when she is done. Hospice contact information provided.

## 2024-07-18 RX ADMIN — MORPHINE SULFATE 5 MILLIGRAM(S): 100 TABLET, EXTENDED RELEASE ORAL at 15:12

## 2024-07-18 RX ADMIN — MORPHINE SULFATE 5 MILLIGRAM(S): 100 TABLET, EXTENDED RELEASE ORAL at 10:33

## 2024-07-18 RX ADMIN — Medication 1 APPLICATION(S): at 06:21

## 2024-07-18 RX ADMIN — MORPHINE SULFATE 5 MILLIGRAM(S): 100 TABLET, EXTENDED RELEASE ORAL at 22:54

## 2024-07-18 RX ADMIN — MORPHINE SULFATE 5 MILLIGRAM(S): 100 TABLET, EXTENDED RELEASE ORAL at 14:36

## 2024-07-18 RX ADMIN — MORPHINE SULFATE 5 MILLIGRAM(S): 100 TABLET, EXTENDED RELEASE ORAL at 18:39

## 2024-07-18 RX ADMIN — MORPHINE SULFATE 5 MILLIGRAM(S): 100 TABLET, EXTENDED RELEASE ORAL at 06:22

## 2024-07-18 RX ADMIN — MORPHINE SULFATE 5 MILLIGRAM(S): 100 TABLET, EXTENDED RELEASE ORAL at 11:14

## 2024-07-18 NOTE — PROGRESS NOTE ADULT - ASSESSMENT
This is a 89-year-old female, past medical history of aortic stenosis, HTN, HLD, and anxiety brought in by daughter for weakness and difficulty ambulating for the last 4 days.    IMPRESSION  #Acute hypoxic respiratory failure. Concern for Community acquired pneumonia vs aspiration, vs chemical pneumonitis.  #Shock- Likely cardiogenic, Severe aortic stenosis. Heart failure with reduced EF- Improved.  #Significant Dysphagia   #MASSIMO over CKD- US kidney bladder normal.  #Transaminitis-Improved.   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  MRSA Nares negative    RECOMMENDATIONS  -Blood cultures negative.   -Abx Discontinued.   -Patient now on hospice.   -Recall ID as needed.     If any questions, please send a message or call on Heavy Teams  Please continue to update ID with any pertinent new laboratory or radiographic findings.    Demario Gill M.D  Infectious Diseases Attending/   [employer] at [facility]

## 2024-07-18 NOTE — CHART NOTE - NSCHARTNOTEFT_GEN_A_CORE
Brief Nutrition Note        pt is 89-year-old female, past medical history of aortic stenosis, HTN, HLD, and anxiety brought in by daughter for weakness and difficulty ambulating for the last 4 days, admitted with sepsis 2/2 PNA. Dysphagia. as per SLP eval on 7/16 recommends NPO with alternate means of nutrition/hydration.  Family requesting palliative care/CMO. pt to be discharged to hospice care. RD to sign off, reconsult if change in POC

## 2024-07-18 NOTE — PROGRESS NOTE ADULT - TIME BILLING
I have personally seen and examined this patient.    I have reviewed all pertinent clinical information and reviewed all relevant imaging and diagnostic studies personally.   I discussed recommendations with the primary team.
On this date of service, level of risk to patient is considered: Moderate.     Reviewed consultant notes  Reviewed labs     I have personally seen and examined this patient.    I have reviewed all pertinent clinical information and reviewed all relevant imaging and diagnostic studies personally.   I discussed recommendations with the primary team.I discussed recommendations with the primary team.
I have personally seen and examined this patient.    I have reviewed all pertinent clinical information and reviewed all relevant imaging and diagnostic studies personally.   I discussed recommendations with the primary team.

## 2024-07-18 NOTE — PROGRESS NOTE ADULT - SUBJECTIVE AND OBJECTIVE BOX
89-year-old female, past medical history of aortic stenosis, HTN, HLD, and anxiety who presents with a chief complaint of sepsis POA, pneumonia, hypokalemia. Patient was seen this morning with patients daughter seen at bedside. Patient was sleeping and appeared comfortable. Patient is currently NPO due to poor intake. Patient daughter states that last night her mother was having labored breathing and a cough. Patients daughter also notice some wheezing as she breaths. Patients daughter denies any other complaints such as fever, chill, CP, abd pain, or n/v/d.     PAST MEDICAL & SURGICAL HISTORY:  HTN (hypertension)      Hypothyroid      Flaco's disease      Aortic stenosis      Anemia      Hip fracture  s/p right ORIF      MEDICATIONS  (STANDING):  chlorhexidine 2% Cloths 1 Application(s) Topical <User Schedule>  citalopram 20 milliGRAM(s) Oral daily  dextrose 5%. 1000 milliLiter(s) (75 mL/Hr) IV Continuous <Continuous>  dextrose 50% Injectable 12.5 Gram(s) IV Push once  ferrous    sulfate 325 milliGRAM(s) Oral daily  heparin   Injectable 5000 Unit(s) SubCutaneous every 8 hours  levothyroxine Injectable 25 MICROGram(s) IV Push <User Schedule>  midodrine 5 milliGRAM(s) Oral every 8 hours  pantoprazole  Injectable 40 milliGRAM(s) IV Push daily    MEDICATIONS  (PRN):  acetaminophen  Suppository .. 650 milliGRAM(s) Rectal every 6 hours PRN Temp greater or equal to 38C (100.4F)  morphine Concentrate 5 milliGRAM(s) Oral every 4 hours PRN Severe Pain (7 - 10)  ondansetron Injectable 4 milliGRAM(s) IV Push every 8 hours PRN Nausea and/or Vomiting    Vital Signs   T(C): 36.8 (18 Jul 2024 04:49), Max: 36.8 (18 Jul 2024 04:49)  T(F): 98.3 (18 Jul 2024 04:49), Max: 98.3 (18 Jul 2024 04:49)  HR: 93 (18 Jul 2024 04:49) (93 - 102)  BP: 110/60 (18 Jul 2024 04:49) (110/60 - 114/68)  BP(mean): 84 (17 Jul 2024 14:14) (84 - 84)  RR: 18 (18 Jul 2024 04:49) (18 - 18)  SpO2: 95% (18 Jul 2024 04:49) (93% - 95%)    O2 Parameters below as of 18 Jul 2024 04:49  Patient On (Oxygen Delivery Method): nasal cannula  O2 Flow (L/min): 2    Physical Exam:   GENERAL: NAD, lying in bed comfortably  HEAD:  Atraumatic, normocephalic  EYES: EOMI, PERRLA, conjunctiva and sclera clear  ENT: Moist mucous membranes  HEART: Regular rate and rhythm, no murmurs, rubs, or gallops  LUNGS: Unlabored respirations.  Clear to auscultation bilaterally, no crackles, wheezing, or rhonchi  ABDOMEN: Soft, nontender, nondistended, +BS  EXTREMITIES: 2+ peripheral pulses bilaterally.  no peripheral edema  NERVOUS SYSTEM:  A&Ox3, no focal deficits   SKIN: No rashes or lesions                                                                                       89-year-old female, past medical history of aortic stenosis, HTN, HLD, and anxiety who presents with a chief complaint of sepsis POA, pneumonia, hypokalemia. Patient was seen this morning with patients daughter seen at bedside. Patient was sleeping and appeared comfortable. Patient is currently NPO due to poor intake. Patient daughter states that last night her mother was having labored breathing and a cough. Patients daughter also notice some wheezing as she breaths. Patients daughter denies any other complaints such as fever, chill, CP, abd pain, or n/v/d.     PAST MEDICAL & SURGICAL HISTORY:  HTN (hypertension)      Hypothyroid      Flaco's disease      Aortic stenosis      Anemia      Hip fracture  s/p right ORIF      MEDICATIONS  (STANDING):  chlorhexidine 2% Cloths 1 Application(s) Topical <User Schedule>  citalopram 20 milliGRAM(s) Oral daily  dextrose 5%. 1000 milliLiter(s) (75 mL/Hr) IV Continuous <Continuous>  dextrose 50% Injectable 12.5 Gram(s) IV Push once  ferrous    sulfate 325 milliGRAM(s) Oral daily  heparin   Injectable 5000 Unit(s) SubCutaneous every 8 hours  levothyroxine Injectable 25 MICROGram(s) IV Push <User Schedule>  midodrine 5 milliGRAM(s) Oral every 8 hours  pantoprazole  Injectable 40 milliGRAM(s) IV Push daily    MEDICATIONS  (PRN):  acetaminophen  Suppository .. 650 milliGRAM(s) Rectal every 6 hours PRN Temp greater or equal to 38C (100.4F)  morphine Concentrate 5 milliGRAM(s) Oral every 4 hours PRN Severe Pain (7 - 10)  ondansetron Injectable 4 milliGRAM(s) IV Push every 8 hours PRN Nausea and/or Vomiting    Vital Signs   T(C): 36.8 (18 Jul 2024 04:49), Max: 36.8 (18 Jul 2024 04:49)  T(F): 98.3 (18 Jul 2024 04:49), Max: 98.3 (18 Jul 2024 04:49)  HR: 93 (18 Jul 2024 04:49) (93 - 102)  BP: 110/60 (18 Jul 2024 04:49) (110/60 - 114/68)  BP(mean): 84 (17 Jul 2024 14:14) (84 - 84)  RR: 18 (18 Jul 2024 04:49) (18 - 18)  SpO2: 95% (18 Jul 2024 04:49) (93% - 95%)    O2 Parameters below as of 18 Jul 2024 04:49  Patient On (Oxygen Delivery Method): nasal cannula  O2 Flow (L/min): 2    Physical Exam:   GENERAL: NAD, lying in bed comfortably  HEAD:  Atraumatic, normocephalic  ENT: Moist mucous membranes  HEART: Regular rate and rhythm, no murmurs, rubs, or gallops  LUNGS: Unlabored respirations.  Clear to auscultation bilaterally, no crackles, wheezing, or rhonchi  ABDOMEN: Soft, nontender, nondistended, +BS  EXTREMITIES: 2+ peripheral pulses bilaterally.  no peripheral edema  NERVOUS SYSTEM:  A&Ox3, no focal deficits   SKIN: No rashes or lesions                                                                                       89-year-old female, past medical history of aortic stenosis, HTN, HLD, and anxiety who presents with a chief complaint of sepsis POA, pneumonia, hypokalemia. Patient was seen this morning with patients daughter seen at bedside. Patient was sleeping and appeared comfortable. Patient is currently NPO due to poor intake. Patient daughter states that last night her mother was having labored breathing and a cough. Patients daughter also notice some wheezing as she breaths. Patients daughter denies any other complaints such as fever, chill, CP, abd pain, or n/v/d.     PAST MEDICAL & SURGICAL HISTORY:  HTN (hypertension)      Hypothyroid      Flaco's disease      Aortic stenosis      Anemia      Hip fracture  s/p right ORIF      MEDICATIONS  (STANDING):  chlorhexidine 2% Cloths 1 Application(s) Topical <User Schedule>  citalopram 20 milliGRAM(s) Oral daily  dextrose 5%. 1000 milliLiter(s) (75 mL/Hr) IV Continuous <Continuous>  dextrose 50% Injectable 12.5 Gram(s) IV Push once  ferrous    sulfate 325 milliGRAM(s) Oral daily  heparin   Injectable 5000 Unit(s) SubCutaneous every 8 hours  levothyroxine Injectable 25 MICROGram(s) IV Push <User Schedule>  midodrine 5 milliGRAM(s) Oral every 8 hours  pantoprazole  Injectable 40 milliGRAM(s) IV Push daily    MEDICATIONS  (PRN):  acetaminophen  Suppository .. 650 milliGRAM(s) Rectal every 6 hours PRN Temp greater or equal to 38C (100.4F)  morphine Concentrate 5 milliGRAM(s) Oral every 4 hours PRN Severe Pain (7 - 10)  ondansetron Injectable 4 milliGRAM(s) IV Push every 8 hours PRN Nausea and/or Vomiting    Vital Signs   T(C): 36.8 (18 Jul 2024 04:49), Max: 36.8 (18 Jul 2024 04:49)  T(F): 98.3 (18 Jul 2024 04:49), Max: 98.3 (18 Jul 2024 04:49)  HR: 93 (18 Jul 2024 04:49) (93 - 102)  BP: 110/60 (18 Jul 2024 04:49) (110/60 - 114/68)  BP(mean): 84 (17 Jul 2024 14:14) (84 - 84)  RR: 18 (18 Jul 2024 04:49) (18 - 18)  SpO2: 95% (18 Jul 2024 04:49) (93% - 95%)    O2 Parameters below as of 18 Jul 2024 04:49  Patient On (Oxygen Delivery Method): nasal cannula  O2 Flow (L/min): 2    Physical Exam:   GENERAL: NAD, lying in bed comfortably  HEAD:  Atraumatic, normocephalic  ENT: Moist mucous membranes  HEART: Regular rate and rhythm, no murmurs, rubs, or gallops  LUNGS: Unlabored respirations.  Clear to auscultation bilaterally, no crackles, wheezing, or rhonchi  ABDOMEN: Soft, nontender, nondistended, +BS  EXTREMITIES: 2+ peripheral pulses bilaterally.  no peripheral edema  NERVOUS SYSTEM:  A&Ox0  SKIN: No rashes or lesions

## 2024-07-18 NOTE — PROGRESS NOTE ADULT - SUBJECTIVE AND OBJECTIVE BOX
89-year-old female, past medical history of aortic stenosis, HTN, HLD, and anxiety    Interval: reviewed chart GOC ; awaiting Hospice spoke with Family    PAST MEDICAL & SURGICAL HISTORY:    HTN (hypertension)  Hypothyroid  [place]'s disease  Aortic stenosis  Anemia  Hip fracture  s/p right ORIF    Social History:    non smoker    MEDICATIONS  (STANDING):  chlorhexidine 2% Cloths 1 Application(s) Topical <User Schedule>  citalopram 20 milliGRAM(s) Oral daily  dextrose 5%. 1000 milliLiter(s) (75 mL/Hr) IV Continuous <Continuous>  dextrose 50% Injectable 12.5 Gram(s) IV Push once  ferrous    sulfate 325 milliGRAM(s) Oral daily  heparin   Injectable 5000 Unit(s) SubCutaneous every 8 hours  levothyroxine Injectable 25 MICROGram(s) IV Push <User Schedule>  midodrine 5 milliGRAM(s) Oral every 8 hours  pantoprazole  Injectable 40 milliGRAM(s) IV Push daily    MEDICATIONS  (PRN):  acetaminophen  Suppository .. 650 milliGRAM(s) Rectal every 6 hours PRN Temp greater or equal to 38C (100.4F)  morphine Concentrate 5 milliGRAM(s) Oral every 4 hours PRN Severe Pain (7 - 10)  ondansetron Injectable 4 milliGRAM(s) IV Push every 8 hours PRN Nausea and/or Vomiting      Allergies    sulfonamides (Unknown)  penicillins (Unknown)    Intolerances    Vital Signs Last 24 Hrs  T(C): 36.8 (18 Jul 2024 04:49), Max: 36.8 (18 Jul 2024 04:49)  T(F): 98.3 (18 Jul 2024 04:49), Max: 98.3 (18 Jul 2024 04:49)  HR: 93 (18 Jul 2024 04:49) (93 - 102)  BP: 110/60 (18 Jul 2024 04:49) (110/60 - 114/68)  BP(mean): 84 (17 Jul 2024 14:14) (84 - 84)  RR: 18 (18 Jul 2024 04:49) (18 - 18)  SpO2: 95% (18 Jul 2024 04:49) (93% - 95%)    Parameters below as of 18 Jul 2024 04:49  Patient On (Oxygen Delivery Method): nasal cannula  O2 Flow (L/min): 2      Diet, NPO (07-16-24 @ 13:02) [Active]    PHYSICAL EXAM:  GENERAL: Elderly female, NAD  HEAD:  Atraumatic, Normocephalic  EYES: EOMI, PERRLA, conjunctiva and sclera clear  ENMT: Dry mucous membranes  CHEST/LUNG: Bilateral breath sounds, decreased RLL; No rales, rhonchi or wheezing  HEART: S1,S2 Regular rate and rhythm; No murmurs, rubs, or gallops  ABDOMEN: Soft, nontender, nondistended, +BS  EXTREMITIES: No calf tenderness bilaterally or peripheral edema   NEUROLOGY: non-focal, BUNCH x4, alert &oriented x3    LAB                          8.1    13.95 )-----------( 280      ( 15 Jul 2024 07:16 )             27.0                            8.0    12.38 )-----------( 278      ( 14 Jul 2024 09:01 )             26.7                        7.6    12.19 )-----------( 313      ( 12 Jul 2024 06:41 )             24.5                                 8.4    23.61 )-----------( 306      ( 05 Jul 2024 12:26 )             25.2                           8.9    17.12 )-----------( 300      ( 04 Jul 2024 16:20 )             28.0     07-14    143  |  110  |  69<HH>  ----------------------------<  90  4.8   |  21  |  1.7<H>    Ca    10.3      14 Jul 2024 09:01        07-12    139  |  104  |  59<H>  ----------------------------<  110<H>  4.8   |  23  |  1.5    Ca    10.4      12 Jul 2024 06:41  Mg     1.9     07-11    TPro  5.8<L>  /  Alb  2.9<L>  /  TBili  0.7  /  DBili  x   /  AST  29  /  ALT  36  /  AlkPhos  116<H>  07-11      07-08    133<L>  |  98  |  49<H>  ----------------------------<  98  4.0   |  23  |  1.5    Ca    9.5      08 Jul 2024 05:58  Mg     2.0     07-08    TPro  5.0<L>  /  Alb  2.7<L>  /  TBili  0.6  /  DBili  x   /  AST  43<H>  /  ALT  52<H>  /  AlkPhos  101  07-08      07-06    138  |  99  |  48<H>  ----------------------------<  118<H>  3.3<L>   |  22  |  1.8<H>    Ca    9.9      06 Jul 2024 06:05  Phos  3.3     07-06  Mg     2.9     07-06    TPro  5.6<L>  /  Alb  2.8<L>  /  TBili  1.0  /  DBili  x   /  AST  81<H>  /  ALT  67<H>  /  AlkPhos  122<H>  07-06 07-05    134<L>  |  96<L>  |  44<H>  ----------------------------<  136<H>  3.5   |  23  |  1.8<H>    Ca    9.1      05 Jul 2024 21:33  Phos  3.7     07-05  Mg     1.2     07-05    TPro  5.9<L>  /  Alb  3.1<L>  /  TBili  1.3<H>  /  DBili  x   /  AST  154<H>  /  ALT  76<H>  /  AlkPhos  119<H>  07-05 07-04    138  |  96<L>  |  37<H>  ----------------------------<  141<H>  2.6<LL>   |  26  |  1.4    Ca    9.6      04 Jul 2024 16:20    TPro  6.6  /  Alb  3.4<L>  /  TBili  1.2  /  DBili  x   /  AST  23  /  ALT  12  /  AlkPhos  112  07-04    RAD    ACC: [id number] EXAM:  XR CHEST PORTABLE IMMED 1V   ORDERED BY: LAKISHA TARIQ     PROCEDURE DATE:  07/13/2024          INTERPRETATION:  Clinical History / Reason for exam: Pneumonia    Comparison : Chest radiograph July 6, 2024.    Technique/Positioning: Frontal chest.    Findings:    Support devices: None.    Cardiac/mediastinum/hilum: Cardiomegaly, thoracic aortic calcification.    Lung parenchyma/Pleura: Bilateral opacities/pleural effusions.    Skeleton/soft tissues: Stable degenerative changes. Thoracic kyphoplasty   Bilateral humeral neck old fracture deformities.    Impression:    Right opacity/pleural effusion, decreased. Left opacity/pleural effusion,   increased.. Stable cardiomegaly        --- End of Report ---            QUETA ONEAL MD; Attending Radiologist  This document has been electronically signed. Jul 13 2024 11:09AM      ACC: [id number] EXAM:  XR PELVIS AP ONLY 1-2 VIEWS   ORDERED BY: SAUMYA JORDAN     PROCEDURE DATE:  07/04/2024          INTERPRETATION:  CLINICAL HISTORY / REASON FOR EXAM: Pain status post fall    COMPARISON: Pelvis radiograph from December 16, 2018    TECHNIQUE: One view, AP pelvis radiograph    FINDINGS:    Status post left cephalomedullary nail. No acute displaced fracture.   Bilateral hip and lumbar spine degenerative changes. Osteopenia. Surgical   clips overlie the right lower pelvis.      IMPRESSION:    No acute displaced fracture.    --- End of Report ---            KACI STACY MD; Attending Radiologist  This document has been electronically signed. Jul 4 2024  8:53PM

## 2024-07-18 NOTE — PROGRESS NOTE ADULT - SUBJECTIVE AND OBJECTIVE BOX
ARIAS PUGA  89y, Female    LOS  14d    INTERVAL EVENTS/HPI  - No acute events overnight  - T(F): , Max: 98.3 (07-18-24 @ 04:49)  - Denies any worsening symptoms  - Tolerating medication    REVIEW OF SYSTEMS: cannot obtain further history from the patient secondary to altered mental status or sedation      Prior hospital charts reviewed [Yes]  Primary team notes reviewed [Yes]  Other consultant notes reviewed [Yes]    ANTIMICROBIALS:       OTHER MEDS: MEDICATIONS  (STANDING):  acetaminophen  Suppository .. 650 every 6 hours PRN  citalopram 20 daily  dextrose 50% Injectable 12.5 once  heparin   Injectable 5000 every 8 hours  levothyroxine Injectable 25 <User Schedule>  midodrine 5 every 8 hours  morphine Concentrate 5 every 4 hours PRN  ondansetron Injectable 4 every 8 hours PRN  pantoprazole  Injectable 40 daily      Vital Signs Last 24 Hrs  T(F): 98 (07-18-24 @ 20:46), Max: 98.7 (07-12-24 @ 05:00)    Vital Signs Last 24 Hrs  HR: 106 (07-18-24 @ 20:46) (93 - 106)  BP: 123/77 (07-18-24 @ 20:46) (110/60 - 128/71)  RR: 16 (07-18-24 @ 20:46)  SpO2: 96% (07-18-24 @ 20:46) (92% - 96%)  Wt(kg): --    EXAM:  GENERAL: NAD, Frail looking.  HEAD: No head lesions  NECK: Supple  CHEST/LUNG: Shallow breath sounds.   HEART: S1 S2  ABDOMEN: Soft, nontender  EXTREMITIES: No clubbing  NERVOUS SYSTEM: Somnolent.     Labs:            WBC Trend:  WBC Count: 13.95 (07-15-24 @ 07:16)  WBC Count: 12.38 (07-14-24 @ 09:01)      Creatine Trend:  Creatinine: 1.4 (07-15)  Creatinine: 1.7 (07-14)  Creatinine: 1.6 (07-13)  Creatinine: 1.5 (07-12)      Liver Biochemical Testing Trend:  Alanine Aminotransferase (ALT/SGPT): 36 (07-11)  Alanine Aminotransferase (ALT/SGPT): 39 (07-10)  Alanine Aminotransferase (ALT/SGPT): 52 *H* (07-08)  Alanine Aminotransferase (ALT/SGPT): 67 *H* (07-06)  Alanine Aminotransferase (ALT/SGPT): 76 *H* (07-05)  Aspartate Aminotransferase (AST/SGOT): 29 (07-11-24 @ 08:27)  Aspartate Aminotransferase (AST/SGOT): 30 (07-10-24 @ 08:19)  Aspartate Aminotransferase (AST/SGOT): 43 (07-08-24 @ 05:58)  Aspartate Aminotransferase (AST/SGOT): 81 (07-06-24 @ 06:05)  Aspartate Aminotransferase (AST/SGOT): 154 (07-05-24 @ 11:22)  Bilirubin Total: 0.7 (07-11)  Bilirubin Total: 0.7 (07-10)  Bilirubin Total: 0.6 (07-08)  Bilirubin Total: 1.0 (07-06)  Bilirubin Total: 1.3 (07-05)      Trend LDH      MICROBIOLOGY:  Vancomycin Level, Trough: 11.4 (07-06 @ 15:00)    Female    Urinalysis with Rflx Culture (collected 04 Jul 2024 17:55)    Culture - Blood (collected 04 Jul 2024 16:20)  Source: .Blood Blood-Peripheral  Final Report:    No growth at 5 days    Culture - Blood (collected 04 Jul 2024 16:20)  Source: .Blood Blood-Peripheral  Final Report:    No growth at 5 days                                  Ferritin: 73 (07-12)                  RADIOLOGY & ADDITIONAL TESTS:  I have personally reviewed the relevant images.   CXR      CT        WEIGHT  Weight (kg): 59.647 (07-09-24 @ 02:33)      All available historical records have been reviewed

## 2024-07-18 NOTE — HOSPICE CARE NOTE - CONVESATION DETAILS
Phone call to patient's daughter Dominique. Reviewed hospice services and philosophy. Dominique states that her mom lives alone and would not be able to go back there. Reviewed hospice at a SNF or possible [facility]. Dominique requesting time to speak to her brother to figure out where the patient would receive hospice care.

## 2024-07-18 NOTE — PROGRESS NOTE ADULT - ASSESSMENT
89-year-old female, past medical history of aortic stenosis, HTN, HLD, and anxiety who presents with a chief complaint of sepsis POA, pneumonia, hypokalemia.    Sepsis POA/Pneumonia:   - Currently off abx   - supplemental o2 prn   - awaiting on hospice f/u     Dysphagia:  - NPO     #Aortic stenosis   -F/U echo  -Echo 2018: normal LV function, G1DD, mild MR/AR, severe AS     #Hypothyroidism  -c/w Synthroid 50 mcg      #HTN/HLD  -holding statin and HCTZ - resume once cleared by speech and swallow     #Anemia (Baseline Hgb 9-11)  -Continue to monitor     #Diet:    #DVT ppx: HSQ   #Activity: OOB with assistance   #[facility] palliative care evaluation for HOSPICE  #DISPOSITION:  D/C planning     89-year-old female, past medical history of aortic stenosis, HTN, HLD, and anxiety who presents with a chief complaint of sepsis POA, pneumonia, hypokalemia.    Sepsis POA/Pneumonia:   - Currently off abx   - supplemental o2 prn   - awaiting on hospice f/u     Dysphagia:  - NPO     #Aortic stenosis   -F/U echo  -Echo 2018: normal LV function, G1DD, mild MR/AR, severe AS     #Hypothyroidism  -c/w Synthroid 50 mcg      #HTN/HLD  -holding statin and HCTZ - resume once cleared by speech and swallow     #Anemia (Baseline Hgb 9-11)  -Continue to monitor     #Diet:    #DVT ppx: HSQ   #[facility] C[facility] palliative care evaluation for HOSPICE  #DISPOSITION:  D/C planning     89-year-old female, past medical history of aortic stenosis, HTN, HLD, and anxiety who presents with a chief complaint of sepsis POA, pneumonia, hypokalemia.    Sepsis POA/Pneumonia:   - Currently off abx   - supplemental o2 prn   - awaiting on hospice f/u     Dysphagia:  - NPO     #Aortic stenosis   -F/U echo  -Echo 2018: normal LV function, G1DD, mild MR/AR, severe AS     #Hypothyroidism  -c/w Synthroid 50 mcg      #HTN/HLD  -holding statin and HCTZ - resume once cleared by speech and swallow     #Anemia (Baseline Hgb 9-11)  -Continue to monitor     #Diet:    #DVT ppx: HSQ   #Activity: OOB with assistance  #[facility] care evaluation for HOSPICE  #DISPOSITION:  D/C planning     89-year-old female, past medical history of aortic stenosis, HTN, HLD, and anxiety who presents with a chief complaint of sepsis POA, pneumonia, hypokalemia.    Sepsis POA/Pneumonia:   - Currently off abx   - supplemental o2 prn   - awaiting on hospice f/u     Dysphagia:  - NPO     #Aortic stenosis   -Echo 2018: normal LV function, G1DD, mild MR/AR, severe AS     #Hypothyroidism  -c/w Synthroid 50 mcg      #HTN/HLD  -holding statin and HCTZ - resume once cleared by speech and swallow     #Anemia (Baseline Hgb 9-11)  -Continue to monitor     #Diet:    #DVT ppx: HSQ   #Activity: OOB with assistance  #ADV[facility] CARE [facility] palliative care evaluation for HOSPICE  #DISPOSITION:  D/C planning     89-year-old female, past medical history of aortic stenosis, HTN, HLD, and anxiety who presents with a chief complaint of sepsis POA, pneumonia, hypokalemia.    Sepsis POA/Pneumonia:   - Currently off abx   - supplemental o2 prn   - awaiting on hospice f/u   - Family opt out to NGT and IV placement       Dysphagia:  - NPO     #Aortic stenosis   -Echo 2018: normal LV function, G1DD, mild MR/AR, severe AS     #Hypothyroidism  -c/w Synthroid 50 mcg      #HTN/HLD  -holding statin and HCTZ - resume once cleared by speech and swallow     #Anemia (Baseline Hgb 9-11)  -Continue to monitor     #Diet:    #DVT ppx: HSQ   #Activity: OOB with assistance  #[facility] CARE [facility] palliative care evaluation for HOSPICE  #DISPOSITION:  D/C planning     89-year-old female, past medical history of aortic stenosis, HTN, HLD, and anxiety who presents with a chief complaint of sepsis POA, pneumonia, hypokalemia.    Sepsis POA/Pneumonia:   - Currently off abx   - supplemental o2 prn   - awaiting on hospice f/u   - Family opt out to NGT and IV placement     Dysphagia:  - NPO     #Aortic stenosis   -Echo 2018: normal LV function, G1DD, mild MR/AR, severe AS     #Hypothyroidism  -c/w Synthroid 50 mcg      #HTN/HLD  -holding statin and HCTZ - resume once cleared by speech and swallow     #Anemia (Baseline Hgb 9-11)  -Continue to monitor     #Diet:    #DVT ppx: HSQ   #Activity: OOB with assistance  #[facility] CARE [facility] palliative care evaluation for HOSPICE  #DISPOSITION:  D/C planning POOR PROGNOSIS

## 2024-07-18 NOTE — PROGRESS NOTE ADULT - ASSESSMENT
89-year-old female, past medical history of aortic stenosis, HTN, HLD, and anxiety   #Sepsis POA 2/2 pneumonia     -off ABX  -improved WBC   -Supplemental O2 prn   -Monitor I&O    # Poor intake / Dysphagia / NPO    - tired   - reviewed speech           #Aortic stenosis   -F/U echo  -Echo 2018: normal LV function, G1DD, mild MR/AR, severe AS     #Hypothyroidism  -Synthroid 50 mcg      #HTN/HLD  -holding statin and HCTZ - resume once cleared by speech and swallow     #Anemia (Baseline Hgb 9-11)  -Continue to monitor     #Diet:    #DVT ppx: HSQ   #Activity: OOB with assistance   #ADVANCE CARE [facility] palliative care evaluation for HOSPICE  #DISPOSITION:  D/C planning

## 2024-07-19 RX ORDER — MORPHINE SULFATE 100 MG/1
0.25 TABLET, EXTENDED RELEASE ORAL
Qty: 3 | Refills: 0
Start: 2024-07-19 | End: 2024-07-20

## 2024-07-19 RX ADMIN — MORPHINE SULFATE 5 MILLIGRAM(S): 100 TABLET, EXTENDED RELEASE ORAL at 12:00

## 2024-07-19 RX ADMIN — MORPHINE SULFATE 5 MILLIGRAM(S): 100 TABLET, EXTENDED RELEASE ORAL at 11:25

## 2024-07-19 RX ADMIN — MORPHINE SULFATE 5 MILLIGRAM(S): 100 TABLET, EXTENDED RELEASE ORAL at 16:39

## 2024-07-19 RX ADMIN — MORPHINE SULFATE 5 MILLIGRAM(S): 100 TABLET, EXTENDED RELEASE ORAL at 02:40

## 2024-07-19 RX ADMIN — MORPHINE SULFATE 5 MILLIGRAM(S): 100 TABLET, EXTENDED RELEASE ORAL at 06:44

## 2024-07-19 RX ADMIN — MORPHINE SULFATE 5 MILLIGRAM(S): 100 TABLET, EXTENDED RELEASE ORAL at 20:41

## 2024-07-19 RX ADMIN — MORPHINE SULFATE 5 MILLIGRAM(S): 100 TABLET, EXTENDED RELEASE ORAL at 15:42

## 2024-07-19 RX ADMIN — MORPHINE SULFATE 5 MILLIGRAM(S): 100 TABLET, EXTENDED RELEASE ORAL at 19:55

## 2024-07-19 RX ADMIN — MORPHINE SULFATE 5 MILLIGRAM(S): 100 TABLET, EXTENDED RELEASE ORAL at 23:54

## 2024-07-19 RX ADMIN — Medication 1 APPLICATION(S): at 06:45

## 2024-07-19 NOTE — CHART NOTE - NSCHARTNOTEFT_GEN_A_CORE
received call from hospice relaying plans for home hospice in process, with DME delivery expected this afternoon, and pt's daughter agreeable to discharge tomorrow in afternoon    As discussed, Roxanol x 2 days e-rxed to [facility] w/ plan for dc tomorrow w/ home hospice

## 2024-07-19 NOTE — PROGRESS NOTE ADULT - SUBJECTIVE AND OBJECTIVE BOX
89-year-old female, past medical history of aortic stenosis, HTN, HLD, and anxiety    Interval: reviewed chart GOC ; awaiting Hospice spoke with Family    PAST MEDICAL & SURGICAL HISTORY:    HTN (hypertension)  Hypothyroid  [place]'s disease  Aortic stenosis  Anemia  Hip fracture  s/p right ORIF    Social History:    non smoker        Allergies    sulfonamides (Unknown)  penicillins (Unknown)    MEDICATIONS  (STANDING):  chlorhexidine 2% Cloths 1 Application(s) Topical <User Schedule>  citalopram 20 milliGRAM(s) Oral daily  dextrose 5%. 1000 milliLiter(s) (75 mL/Hr) IV Continuous <Continuous>  dextrose 50% Injectable 12.5 Gram(s) IV Push once  ferrous    sulfate 325 milliGRAM(s) Oral daily  heparin   Injectable 5000 Unit(s) SubCutaneous every 8 hours  levothyroxine Injectable 25 MICROGram(s) IV Push <User Schedule>  midodrine 5 milliGRAM(s) Oral every 8 hours  pantoprazole  Injectable 40 milliGRAM(s) IV Push daily    MEDICATIONS  (PRN):  acetaminophen  Suppository .. 650 milliGRAM(s) Rectal every 6 hours PRN Temp greater or equal to 38C (100.4F)  morphine Concentrate 5 milliGRAM(s) Oral every 4 hours PRN Severe Pain (7 - 10)  ondansetron Injectable 4 milliGRAM(s) IV Push every 8 hours PRN Nausea and/or Vomiting  Intolerances    Vital Signs Last 24 Hrs  T(C): 36.8 (19 Jul 2024 04:55), Max: 36.8 (19 Jul 2024 04:55)  T(F): 98.2 (19 Jul 2024 04:55), Max: 98.2 (19 Jul 2024 04:55)  HR: 102 (19 Jul 2024 04:55) (102 - 106)  BP: 134/75 (19 Jul 2024 04:55) (123/77 - 134/75)  BP(mean): --  RR: 16 (19 Jul 2024 04:55) (16 - 16)  SpO2: 95% (19 Jul 2024 04:55) (92% - 96%)    Parameters below as of 19 Jul 2024 04:55  Patient On (Oxygen Delivery Method): nasal cannula  O2 Flow (L/min): 2      Diet, NPO (07-16-24 @ 13:02) [Active]    PHYSICAL EXAM:  GENERAL: Elderly female, NAD  HEAD:  Atraumatic, Normocephalic  EYES: EOMI, PERRLA, conjunctiva and sclera clear  ENMT: Dry mucous membranes  CHEST/LUNG: Bilateral breath sounds, decreased RLL; No rales, rhonchi or wheezing  HEART: S1,S2 Regular rate and rhythm; No murmurs, rubs, or gallops  ABDOMEN: Soft, nontender, nondistended, +BS  EXTREMITIES: No calf tenderness bilaterally or peripheral edema   NEUROLOGY: non-focal, BUNCH x4, alert &oriented x3

## 2024-07-19 NOTE — CHART NOTE - NSCHARTNOTESELECT_GEN_ALL_CORE
Event Note
Event Note
MED PA/Event Note
Nutrition Services
PA NOTE
PA Note/Event Note
PA note
PA note
Event Note
MED PA/Event Note
PA Note/Event Note
PA note
Palliative Care- Social Work/Event Note
Palliative Care/Event Note
Palliative/Event Note
Transfer Note

## 2024-07-19 NOTE — PROGRESS NOTE ADULT - ASSESSMENT
89-year-old female, past medical history of aortic stenosis, HTN, HLD, and anxiety who presents with a chief complaint of sepsis POA, pneumonia, hypokalemia.    Sepsis POA/Pneumonia:   - Currently off abx   - supplemental o2 prn   - awaiting on hospice decision by nita     Dysphagia:  - NPO     #Aortic stenosis   -Echo 2018: normal LV function, G1DD, mild MR/AR, severe AS     #Hypothyroidism  -c/w Synthroid 50 mcg      #HTN/HLD  -holding statin and HCTZ - resume once cleared by speech and swallow     #Anemia (Baseline Hgb 9-11)  -Continue to monitor     #Diet:    #DVT ppx: HSQ   #Activity: OOB with assistance  #[facility] palliative care evaluation for HOSPICE  #DISPOSITION:  D/C planning POOR PROGNOSIS

## 2024-07-19 NOTE — HOSPICE CARE NOTE - CONVESATION DETAILS
Spoke with daughter Dominique several times this morning regarding hospice care and discharge plan. Family is receptive to home hospice and would like patient to be discharged tomorrow 7/20/24. DME to be delivered this afternoon. Patient is to be discharged tomorrow early afternoon and hospice admission set for Sunday. Patient will need to go home with [facility] Morphine. This was discussed with TIM Velez who is going to escribe it to [facility] so the family can have it at home for the patient. KAREEM Kay aware of discharge plan.

## 2024-07-19 NOTE — CHART NOTE - NSCHARTNOTEFT_GEN_A_CORE
Patient examined at the bedside. Patient resting comfortably, no distress noted. Per primary team, patient planned for discharge home with hospice over the weekend.     Palliative will sign off. Please recall or reconsult [phone] 24/7 if needed.

## 2024-07-20 VITALS
TEMPERATURE: 98 F | OXYGEN SATURATION: 95 % | SYSTOLIC BLOOD PRESSURE: 116 MMHG | DIASTOLIC BLOOD PRESSURE: 58 MMHG | RESPIRATION RATE: 16 BRPM | HEART RATE: 107 BPM

## 2024-07-20 RX ORDER — LEVOTHYROXINE SODIUM 25 MCG
1 TABLET ORAL
Refills: 0 | DISCHARGE

## 2024-07-20 RX ORDER — ATORVASTATIN CALCIUM 20 MG/1
1 TABLET, FILM COATED ORAL
Refills: 0 | DISCHARGE

## 2024-07-20 RX ORDER — FERROUS SULFATE 325(65) MG
1 TABLET ORAL
Qty: 0 | Refills: 0 | DISCHARGE

## 2024-07-20 RX ORDER — HYDROCHLOROTHIAZIDE 25 MG
1 TABLET ORAL
Refills: 0 | DISCHARGE

## 2024-07-20 RX ADMIN — MORPHINE SULFATE 5 MILLIGRAM(S): 100 TABLET, EXTENDED RELEASE ORAL at 09:13

## 2024-07-20 RX ADMIN — MORPHINE SULFATE 5 MILLIGRAM(S): 100 TABLET, EXTENDED RELEASE ORAL at 05:18

## 2024-07-20 RX ADMIN — MORPHINE SULFATE 5 MILLIGRAM(S): 100 TABLET, EXTENDED RELEASE ORAL at 00:54

## 2024-07-20 RX ADMIN — MORPHINE SULFATE 5 MILLIGRAM(S): 100 TABLET, EXTENDED RELEASE ORAL at 03:58

## 2024-07-20 NOTE — PROGRESS NOTE ADULT - REASON FOR ADMISSION
sepsis POA, pneumonia, hypokalemia

## 2024-07-20 NOTE — PROGRESS NOTE ADULT - ASSESSMENT
89-year-old female, past medical history of aortic stenosis, HTN, HLD, and anxiety who presents with a chief complaint of sepsis POA, pneumonia, hypokalemia.    Sepsis POA/Pneumonia:   - Currently off abx   - supplemental o2 prn   - awaiting on hospice decision by nita     Dysphagia:  - NPO     #Aortic stenosis   -Echo 2018: normal LV function, G1DD, mild MR/AR, severe AS     #Hypothyroidism  -c/w Synthroid 50 mcg      #HTN/HLD  -holding statin and HCTZ - resume once cleared by speech and swallow     #Anemia (Baseline Hgb 9-11)  -Continue to monitor     #Diet:    #DVT ppx: HSQ   #Activity: OOB with assistance  #[facility] palliative care evaluation for HOSPICE  #DISPOSITION:  D/C hospice

## 2024-07-20 NOTE — DISCHARGE NOTE NURSING/CASE MANAGEMENT/SOCIAL WORK - NSDCPEFALRISK_GEN_ALL_CORE
For information on Fall & Injury Prevention, visit: [email or web address] OR  [email or web address] OR  https://www.cdc.gov/steadi/patient.html

## 2024-07-20 NOTE — DISCHARGE NOTE NURSING/CASE MANAGEMENT/SOCIAL WORK - NSDCVIVACCINE_GEN_ALL_CORE_FT
Tdap; 16-Dec-2018 16:02; Ivan Phillip (YE); [employer]; [id number] (Exp. Date: 02-Nov-2020); IntraMuscular; Deltoid Right.; 0.5 milliLiter(s); VIS (VIS Published: 09-May-2013, VIS Presented: 16-Dec-2018);

## 2024-07-20 NOTE — PROGRESS NOTE ADULT - PROVIDER SPECIALTY LIST ADULT
Internal Medicine
Pulmonology
Pulmonology
Critical Care
Internal Medicine
Internal Medicine
Pulmonology
Infectious Disease
Internal Medicine
Cardiology
Cardiology
Critical Care
Infectious Disease
Internal Medicine
Infectious Disease
Infectious Disease

## 2024-07-20 NOTE — PROGRESS NOTE ADULT - SUBJECTIVE AND OBJECTIVE BOX
89-year-old female, past medical history of aortic stenosis, HTN, HLD, and anxiety    Interval: reviewed chart GOC ; awaiting Hospice     PAST MEDICAL & SURGICAL HISTORY:    HTN (hypertension)  Hypothyroid  [place]'s disease  Aortic stenosis  Anemia  Hip fracture  s/p right ORIF    Social History:    non smoker    Allergies    sulfonamides (Unknown)  penicillins (Unknown)    MEDICATIONS  (STANDING):  chlorhexidine 2% Cloths 1 Application(s) Topical <User Schedule>  citalopram 20 milliGRAM(s) Oral daily  dextrose 5%. 1000 milliLiter(s) (75 mL/Hr) IV Continuous <Continuous>  dextrose 50% Injectable 12.5 Gram(s) IV Push once  ferrous    sulfate 325 milliGRAM(s) Oral daily  heparin   Injectable 5000 Unit(s) SubCutaneous every 8 hours  levothyroxine Injectable 25 MICROGram(s) IV Push <User Schedule>  midodrine 5 milliGRAM(s) Oral every 8 hours  pantoprazole  Injectable 40 milliGRAM(s) IV Push daily    MEDICATIONS  (PRN):  acetaminophen  Suppository .. 650 milliGRAM(s) Rectal every 6 hours PRN Temp greater or equal to 38C (100.4F)  morphine Concentrate 5 milliGRAM(s) Oral every 4 hours PRN Severe Pain (7 - 10)  ondansetron Injectable 4 milliGRAM(s) IV Push every 8 hours PRN Nausea and/or Vomiting      Vital Signs Last 24 Hrs  T(C): 36.4 (20 Jul 2024 05:18), Max: 36.8 (19 Jul 2024 20:50)  T(F): 97.5 (20 Jul 2024 05:18), Max: 98.3 (19 Jul 2024 20:50)  HR: 107 (20 Jul 2024 05:18) (102 - 107)  BP: 116/58 (20 Jul 2024 05:18) (116/58 - 124/73)  BP(mean): --  RR: 16 (20 Jul 2024 05:18) (16 - 16)  SpO2: 95% (20 Jul 2024 05:18) (94% - 96%)    Parameters below as of 20 Jul 2024 05:18  Patient On (Oxygen Delivery Method): nasal cannula  O2 Flow (L/min): 2      Diet, NPO (07-16-24 @ 13:02) [Active]    PHYSICAL EXAM:  GENERAL: Elderly female, NAD  HEAD:  Atraumatic, Normocephalic  EYES: EOMI, PERRLA, conjunctiva and sclera clear  ENMT: Dry mucous membranes  CHEST/LUNG: Bilateral breath sounds, decreased RLL; No rales, rhonchi or wheezing  HEART: S1,S2 Regular rate and rhythm; No murmurs, rubs, or gallops  ABDOMEN: Soft, nontender, nondistended, +BS  EXTREMITIES: No calf tenderness bilaterally or peripheral edema   NEUROLOGY: non-focal, BUNCH x4, alert &oriented x3

## 2024-07-27 DIAGNOSIS — I35.0 NONRHEUMATIC AORTIC (VALVE) STENOSIS: ICD-10-CM

## 2024-07-27 DIAGNOSIS — A41.9 SEPSIS, UNSPECIFIED ORGANISM: ICD-10-CM

## 2024-07-27 DIAGNOSIS — R57.0 CARDIOGENIC SHOCK: ICD-10-CM

## 2024-07-27 DIAGNOSIS — Z88.0 ALLERGY STATUS TO PENICILLIN: ICD-10-CM

## 2024-07-27 DIAGNOSIS — R53.1 WEAKNESS: ICD-10-CM

## 2024-07-27 DIAGNOSIS — D84.89 OTHER IMMUNODEFICIENCIES: ICD-10-CM

## 2024-07-27 DIAGNOSIS — D64.9 ANEMIA, UNSPECIFIED: ICD-10-CM

## 2024-07-27 DIAGNOSIS — L11.1 TRANSIENT ACANTHOLYTIC DERMATOSIS [GROVER]: ICD-10-CM

## 2024-07-27 DIAGNOSIS — N18.9 CHRONIC KIDNEY DISEASE, UNSPECIFIED: ICD-10-CM

## 2024-07-27 DIAGNOSIS — I24.89 OTHER FORMS OF ACUTE ISCHEMIC HEART DISEASE: ICD-10-CM

## 2024-07-27 DIAGNOSIS — J96.01 ACUTE RESPIRATORY FAILURE WITH HYPOXIA: ICD-10-CM

## 2024-07-27 DIAGNOSIS — I50.20 UNSPECIFIED SYSTOLIC (CONGESTIVE) HEART FAILURE: ICD-10-CM

## 2024-07-27 DIAGNOSIS — Z51.5 ENCOUNTER FOR PALLIATIVE CARE: ICD-10-CM

## 2024-07-27 DIAGNOSIS — G93.41 METABOLIC ENCEPHALOPATHY: ICD-10-CM

## 2024-07-27 DIAGNOSIS — R13.13 DYSPHAGIA, PHARYNGEAL PHASE: ICD-10-CM

## 2024-07-27 DIAGNOSIS — N17.9 ACUTE KIDNEY FAILURE, UNSPECIFIED: ICD-10-CM

## 2024-07-27 DIAGNOSIS — F41.9 ANXIETY DISORDER, UNSPECIFIED: ICD-10-CM

## 2024-07-27 DIAGNOSIS — Z88.2 ALLERGY STATUS TO SULFONAMIDES: ICD-10-CM

## 2024-07-27 DIAGNOSIS — I13.0 HYPERTENSIVE HEART AND CHRONIC KIDNEY DISEASE WITH HEART FAILURE AND STAGE 1 THROUGH STAGE 4 CHRONIC KIDNEY DISEASE, OR UNSPECIFIED CHRONIC KIDNEY DISEASE: ICD-10-CM

## 2024-07-27 DIAGNOSIS — J18.9 PNEUMONIA, UNSPECIFIED ORGANISM: ICD-10-CM

## 2024-07-27 DIAGNOSIS — E03.9 HYPOTHYROIDISM, UNSPECIFIED: ICD-10-CM

## 2024-07-27 DIAGNOSIS — E87.6 HYPOKALEMIA: ICD-10-CM

## 2024-07-27 DIAGNOSIS — E78.5 HYPERLIPIDEMIA, UNSPECIFIED: ICD-10-CM

## 2024-07-27 DIAGNOSIS — Z79.82 LONG TERM (CURRENT) USE OF ASPIRIN: ICD-10-CM

## 2024-09-26 NOTE — ED PROVIDER NOTE - SKIN [+], MLM
Follow Up Phone Call    Outgoing phone call    Spoke to: Radha Watts Relationship:self   Phone number: [phone]      Outcome: contacted patient/ family   No chief complaint on file.         Diagnosis:Not applicable    States she is feeling better. Voiding without difficulty. No further questions or concerns.           +itchy rash

## 2025-02-21 NOTE — ED PROVIDER NOTE - TEMPLATE, MLM
Received request via: Patient    Was the patient seen in the last year in this department? Yes    Does the patient have an active prescription (recently filled or refills available) for medication(s) requested? No      Does the patient have halfway Plus and need 100-day supply? (This applies to ALL medications) Patient does not have SCP  
General

## 2025-03-27 NOTE — PHYSICAL THERAPY INITIAL EVALUATION ADULT - NAME OF CLINICIAN
Blood glucose 472, insulin given as per orders glucose 449 First Blood glucose >600, second Blood glucose 563, insulin administered as per orders YE , Taylor